# Patient Record
Sex: MALE | Race: WHITE | Employment: OTHER | URBAN - METROPOLITAN AREA
[De-identification: names, ages, dates, MRNs, and addresses within clinical notes are randomized per-mention and may not be internally consistent; named-entity substitution may affect disease eponyms.]

---

## 2022-01-01 ENCOUNTER — HOSPITAL ENCOUNTER (INPATIENT)
Age: 42
LOS: 3 days | Discharge: HOSPICE | DRG: 053 | End: 2022-10-29
Attending: INTERNAL MEDICINE | Admitting: INTERNAL MEDICINE
Payer: COMMERCIAL

## 2022-01-01 ENCOUNTER — APPOINTMENT (OUTPATIENT)
Dept: GENERAL RADIOLOGY | Age: 42
DRG: 196 | End: 2022-01-01
Payer: COMMERCIAL

## 2022-01-01 ENCOUNTER — APPOINTMENT (OUTPATIENT)
Dept: MRI IMAGING | Age: 42
DRG: 196 | End: 2022-01-01
Payer: COMMERCIAL

## 2022-01-01 ENCOUNTER — HOSPITAL ENCOUNTER (INPATIENT)
Age: 42
LOS: 1 days | DRG: 862 | End: 2022-10-30
Attending: INTERNAL MEDICINE | Admitting: INTERNAL MEDICINE
Payer: COMMERCIAL

## 2022-01-01 ENCOUNTER — APPOINTMENT (OUTPATIENT)
Dept: CT IMAGING | Age: 42
DRG: 196 | End: 2022-01-01
Payer: COMMERCIAL

## 2022-01-01 ENCOUNTER — APPOINTMENT (OUTPATIENT)
Dept: GENERAL RADIOLOGY | Age: 42
DRG: 053 | End: 2022-01-01
Attending: INTERNAL MEDICINE
Payer: COMMERCIAL

## 2022-01-01 ENCOUNTER — HOSPITAL ENCOUNTER (INPATIENT)
Age: 42
LOS: 6 days | Discharge: ANOTHER HOSPITAL | DRG: 196 | End: 2022-10-26
Attending: EMERGENCY MEDICINE | Admitting: INTERNAL MEDICINE
Payer: COMMERCIAL

## 2022-01-01 VITALS
RESPIRATION RATE: 18 BRPM | SYSTOLIC BLOOD PRESSURE: 133 MMHG | BODY MASS INDEX: 22.46 KG/M2 | OXYGEN SATURATION: 95 % | DIASTOLIC BLOOD PRESSURE: 89 MMHG | TEMPERATURE: 98.8 F | WEIGHT: 139.77 LBS | HEART RATE: 102 BPM | HEIGHT: 66 IN

## 2022-01-01 VITALS
SYSTOLIC BLOOD PRESSURE: 138 MMHG | DIASTOLIC BLOOD PRESSURE: 83 MMHG | RESPIRATION RATE: 35 BRPM | OXYGEN SATURATION: 84 % | HEART RATE: 125 BPM | BODY MASS INDEX: 24.06 KG/M2 | WEIGHT: 149.69 LBS | TEMPERATURE: 98.8 F | HEIGHT: 66 IN

## 2022-01-01 VITALS — OXYGEN SATURATION: 21 %

## 2022-01-01 DIAGNOSIS — R09.2 RESPIRATORY ARREST (HCC): Primary | ICD-10-CM

## 2022-01-01 DIAGNOSIS — R06.89 HYPERCARBIA: ICD-10-CM

## 2022-01-01 DIAGNOSIS — T17.908A ASPIRATION INTO AIRWAY, INITIAL ENCOUNTER: ICD-10-CM

## 2022-01-01 LAB
A/G RATIO: 1.2 (ref 1.1–2.2)
A/G RATIO: 1.3 (ref 1.1–2.2)
A/G RATIO: 1.3 (ref 1.1–2.2)
A/G RATIO: 1.7 (ref 1.1–2.2)
ALBUMIN SERPL-MCNC: 3.2 G/DL (ref 3.4–5)
ALBUMIN SERPL-MCNC: 3.3 G/DL (ref 3.4–5)
ALBUMIN SERPL-MCNC: 3.4 G/DL (ref 3.4–5)
ALBUMIN SERPL-MCNC: 3.6 G/DL (ref 3.4–5)
ALBUMIN SERPL-MCNC: 3.6 G/DL (ref 3.4–5)
ALBUMIN SERPL-MCNC: 3.7 G/DL (ref 3.4–5)
ALBUMIN SERPL-MCNC: 3.8 G/DL (ref 3.4–5)
ALBUMIN SERPL-MCNC: 4.5 G/DL (ref 3.4–5)
ALP BLD-CCNC: 38 U/L (ref 40–129)
ALP BLD-CCNC: 43 U/L (ref 40–129)
ALP BLD-CCNC: 43 U/L (ref 40–129)
ALP BLD-CCNC: 45 U/L (ref 40–129)
ALP BLD-CCNC: 45 U/L (ref 40–129)
ALP BLD-CCNC: 70 U/L (ref 40–129)
ALT SERPL-CCNC: 14 U/L (ref 10–40)
ALT SERPL-CCNC: 17 U/L (ref 10–40)
ALT SERPL-CCNC: 17 U/L (ref 10–40)
ALT SERPL-CCNC: 20 U/L (ref 10–40)
ALT SERPL-CCNC: 26 U/L (ref 10–40)
ALT SERPL-CCNC: 30 U/L (ref 10–40)
ANION GAP SERPL CALCULATED.3IONS-SCNC: 10 MMOL/L (ref 3–16)
ANION GAP SERPL CALCULATED.3IONS-SCNC: 11 MMOL/L (ref 3–16)
ANION GAP SERPL CALCULATED.3IONS-SCNC: 11 MMOL/L (ref 3–16)
ANION GAP SERPL CALCULATED.3IONS-SCNC: 14 MMOL/L (ref 3–16)
ANION GAP SERPL CALCULATED.3IONS-SCNC: 6 MMOL/L (ref 3–16)
ANION GAP SERPL CALCULATED.3IONS-SCNC: 7 MMOL/L (ref 3–16)
ANION GAP SERPL CALCULATED.3IONS-SCNC: 7 MMOL/L (ref 3–16)
ANION GAP SERPL CALCULATED.3IONS-SCNC: 8 MMOL/L (ref 3–16)
ANION GAP SERPL CALCULATED.3IONS-SCNC: 9 MMOL/L (ref 3–16)
ANION GAP SERPL CALCULATED.3IONS-SCNC: 9 MMOL/L (ref 3–16)
ANISOCYTOSIS: ABNORMAL
APPEARANCE BAL (LAVAGE): NORMAL
AST SERPL-CCNC: 27 U/L (ref 15–37)
AST SERPL-CCNC: 29 U/L (ref 15–37)
AST SERPL-CCNC: 30 U/L (ref 15–37)
AST SERPL-CCNC: 31 U/L (ref 15–37)
AST SERPL-CCNC: 33 U/L (ref 15–37)
AST SERPL-CCNC: 34 U/L (ref 15–37)
ATYPICAL LYMPHOCYTE RELATIVE PERCENT: 2 % (ref 0–6)
BACTERIA: ABNORMAL /HPF
BANDED NEUTROPHILS RELATIVE PERCENT: 3 % (ref 0–7)
BANDED NEUTROPHILS RELATIVE PERCENT: 7 % (ref 0–7)
BANDED NEUTROPHILS RELATIVE PERCENT: 9 % (ref 0–7)
BASE EXCESS ARTERIAL: 10.6 MMOL/L (ref -3–3)
BASE EXCESS ARTERIAL: 10.7 MMOL/L (ref -3–3)
BASE EXCESS ARTERIAL: 10.7 MMOL/L (ref -3–3)
BASE EXCESS ARTERIAL: 13.1 MMOL/L (ref -3–3)
BASE EXCESS ARTERIAL: 16.4 MMOL/L (ref -3–3)
BASE EXCESS ARTERIAL: 17 (ref -3–3)
BASE EXCESS ARTERIAL: 5.6 MMOL/L (ref -3–3)
BASE EXCESS ARTERIAL: 7.1 MMOL/L (ref -3–3)
BASE EXCESS ARTERIAL: 8 MMOL/L (ref -3–3)
BASE EXCESS ARTERIAL: 9.5 MMOL/L (ref -3–3)
BASE EXCESS VENOUS: 13.4 MMOL/L
BASE EXCESS VENOUS: 8.1 MMOL/L
BASOPHILS ABSOLUTE: 0 K/UL (ref 0–0.2)
BASOPHILS ABSOLUTE: 0.1 K/UL (ref 0–0.2)
BASOPHILS RELATIVE PERCENT: 0 %
BASOPHILS RELATIVE PERCENT: 0.1 %
BASOPHILS RELATIVE PERCENT: 0.3 %
BASOPHILS RELATIVE PERCENT: 0.6 %
BASOPHILS RELATIVE PERCENT: 0.7 %
BILIRUB SERPL-MCNC: 0.3 MG/DL (ref 0–1)
BILIRUB SERPL-MCNC: 0.3 MG/DL (ref 0–1)
BILIRUB SERPL-MCNC: 0.4 MG/DL (ref 0–1)
BILIRUB SERPL-MCNC: 0.5 MG/DL (ref 0–1)
BILIRUB SERPL-MCNC: <0.2 MG/DL (ref 0–1)
BILIRUB SERPL-MCNC: <0.2 MG/DL (ref 0–1)
BILIRUBIN URINE: NEGATIVE
BLOOD CULTURE, ROUTINE: NORMAL
BLOOD, URINE: NEGATIVE
BUN BLDV-MCNC: 15 MG/DL (ref 7–20)
BUN BLDV-MCNC: 20 MG/DL (ref 7–20)
BUN BLDV-MCNC: 21 MG/DL (ref 7–20)
BUN BLDV-MCNC: 21 MG/DL (ref 7–20)
BUN BLDV-MCNC: 22 MG/DL (ref 7–20)
BUN BLDV-MCNC: 23 MG/DL (ref 7–20)
BUN BLDV-MCNC: 25 MG/DL (ref 7–20)
BUN BLDV-MCNC: 25 MG/DL (ref 7–20)
CALCIUM SERPL-MCNC: 7.8 MG/DL (ref 8.3–10.6)
CALCIUM SERPL-MCNC: 8.9 MG/DL (ref 8.3–10.6)
CALCIUM SERPL-MCNC: 8.9 MG/DL (ref 8.3–10.6)
CALCIUM SERPL-MCNC: 9 MG/DL (ref 8.3–10.6)
CALCIUM SERPL-MCNC: 9.3 MG/DL (ref 8.3–10.6)
CALCIUM SERPL-MCNC: 9.4 MG/DL (ref 8.3–10.6)
CALCIUM SERPL-MCNC: 9.4 MG/DL (ref 8.3–10.6)
CALCIUM SERPL-MCNC: 9.5 MG/DL (ref 8.3–10.6)
CARBOXYHEMOGLOBIN ARTERIAL: 0.8 % (ref 0–1.5)
CARBOXYHEMOGLOBIN ARTERIAL: 1 % (ref 0–1.5)
CARBOXYHEMOGLOBIN ARTERIAL: 1 % (ref 0–1.5)
CARBOXYHEMOGLOBIN ARTERIAL: 1.2 % (ref 0–1.5)
CARBOXYHEMOGLOBIN ARTERIAL: 1.7 % (ref 0–1.5)
CARBOXYHEMOGLOBIN ARTERIAL: 1.7 % (ref 0–1.5)
CARBOXYHEMOGLOBIN ARTERIAL: 1.8 % (ref 0–1.5)
CARBOXYHEMOGLOBIN ARTERIAL: 1.9 % (ref 0–1.5)
CARBOXYHEMOGLOBIN: 1.7 %
CARBOXYHEMOGLOBIN: 1.7 %
CARBOXYHEMOGLOBIN: 1.8 %
CARBOXYHEMOGLOBIN: 1.9 %
CHLORIDE BLD-SCNC: 89 MMOL/L (ref 99–110)
CHLORIDE BLD-SCNC: 90 MMOL/L (ref 99–110)
CHLORIDE BLD-SCNC: 91 MMOL/L (ref 99–110)
CHLORIDE BLD-SCNC: 92 MMOL/L (ref 99–110)
CHLORIDE BLD-SCNC: 93 MMOL/L (ref 99–110)
CHLORIDE BLD-SCNC: 93 MMOL/L (ref 99–110)
CHLORIDE BLD-SCNC: 94 MMOL/L (ref 99–110)
CHLORIDE BLD-SCNC: 94 MMOL/L (ref 99–110)
CHLORIDE BLD-SCNC: 96 MMOL/L (ref 99–110)
CHLORIDE BLD-SCNC: 97 MMOL/L (ref 99–110)
CLARITY: CLEAR
CLOT EVALUATION BAL: NORMAL
CO2: 28 MMOL/L (ref 21–32)
CO2: 29 MMOL/L (ref 21–32)
CO2: 31 MMOL/L (ref 21–32)
CO2: 31 MMOL/L (ref 21–32)
CO2: 33 MMOL/L (ref 21–32)
CO2: 33 MMOL/L (ref 21–32)
CO2: 34 MMOL/L (ref 21–32)
CO2: 37 MMOL/L (ref 21–32)
CO2: 37 MMOL/L (ref 21–32)
CO2: 45 MMOL/L (ref 21–32)
COLOR LAVAGE: NORMAL
COLOR: YELLOW
COMMENT 4: NORMAL
CREAT SERPL-MCNC: 0.5 MG/DL (ref 0.9–1.3)
CREAT SERPL-MCNC: 0.6 MG/DL (ref 0.9–1.3)
CREAT SERPL-MCNC: 0.7 MG/DL (ref 0.9–1.3)
CREAT SERPL-MCNC: 0.8 MG/DL (ref 0.9–1.3)
CREAT SERPL-MCNC: 1.2 MG/DL (ref 0.9–1.3)
CREAT SERPL-MCNC: <0.5 MG/DL (ref 0.9–1.3)
CULTURE, RESPIRATORY: ABNORMAL
CULTURE, RESPIRATORY: ABNORMAL
EKG ATRIAL RATE: 112 BPM
EKG DIAGNOSIS: NORMAL
EKG P AXIS: 51 DEGREES
EKG P-R INTERVAL: 146 MS
EKG Q-T INTERVAL: 332 MS
EKG QRS DURATION: 96 MS
EKG QTC CALCULATION (BAZETT): 453 MS
EKG R AXIS: 65 DEGREES
EKG T AXIS: 25 DEGREES
EKG VENTRICULAR RATE: 112 BPM
EOSINOPHILS ABSOLUTE: 0 K/UL (ref 0–0.6)
EOSINOPHILS ABSOLUTE: 0.1 K/UL (ref 0–0.6)
EOSINOPHILS ABSOLUTE: 0.2 K/UL (ref 0–0.6)
EOSINOPHILS ABSOLUTE: 0.5 K/UL (ref 0–0.6)
EOSINOPHILS ABSOLUTE: 1 K/UL (ref 0–0.6)
EOSINOPHILS RELATIVE PERCENT: 0 %
EOSINOPHILS RELATIVE PERCENT: 0.4 %
EOSINOPHILS RELATIVE PERCENT: 2 %
EOSINOPHILS RELATIVE PERCENT: 2.7 %
EOSINOPHILS RELATIVE PERCENT: 5.7 %
EPITHELIAL CELLS FLUID: 100 %
EPITHELIAL CELLS, UA: 2 /HPF (ref 0–5)
GFR SERPL CREATININE-BSD FRML MDRD: >60 ML/MIN/{1.73_M2}
GLUCOSE BLD-MCNC: 100 MG/DL (ref 70–99)
GLUCOSE BLD-MCNC: 102 MG/DL (ref 70–99)
GLUCOSE BLD-MCNC: 103 MG/DL (ref 70–99)
GLUCOSE BLD-MCNC: 109 MG/DL (ref 70–99)
GLUCOSE BLD-MCNC: 111 MG/DL (ref 70–99)
GLUCOSE BLD-MCNC: 112 MG/DL (ref 70–99)
GLUCOSE BLD-MCNC: 114 MG/DL (ref 70–99)
GLUCOSE BLD-MCNC: 117 MG/DL (ref 70–99)
GLUCOSE BLD-MCNC: 120 MG/DL (ref 70–99)
GLUCOSE BLD-MCNC: 121 MG/DL (ref 70–99)
GLUCOSE BLD-MCNC: 121 MG/DL (ref 70–99)
GLUCOSE BLD-MCNC: 122 MG/DL (ref 70–99)
GLUCOSE BLD-MCNC: 122 MG/DL (ref 70–99)
GLUCOSE BLD-MCNC: 124 MG/DL (ref 70–99)
GLUCOSE BLD-MCNC: 126 MG/DL (ref 70–99)
GLUCOSE BLD-MCNC: 130 MG/DL (ref 70–99)
GLUCOSE BLD-MCNC: 131 MG/DL (ref 70–99)
GLUCOSE BLD-MCNC: 133 MG/DL (ref 70–99)
GLUCOSE BLD-MCNC: 138 MG/DL (ref 70–99)
GLUCOSE BLD-MCNC: 140 MG/DL (ref 70–99)
GLUCOSE BLD-MCNC: 141 MG/DL (ref 70–99)
GLUCOSE BLD-MCNC: 141 MG/DL (ref 70–99)
GLUCOSE BLD-MCNC: 148 MG/DL (ref 70–99)
GLUCOSE BLD-MCNC: 152 MG/DL (ref 70–99)
GLUCOSE BLD-MCNC: 154 MG/DL (ref 70–99)
GLUCOSE BLD-MCNC: 159 MG/DL (ref 70–99)
GLUCOSE BLD-MCNC: 160 MG/DL (ref 70–99)
GLUCOSE BLD-MCNC: 164 MG/DL (ref 70–99)
GLUCOSE BLD-MCNC: 165 MG/DL (ref 70–99)
GLUCOSE BLD-MCNC: 167 MG/DL (ref 70–99)
GLUCOSE BLD-MCNC: 169 MG/DL (ref 70–99)
GLUCOSE BLD-MCNC: 170 MG/DL (ref 70–99)
GLUCOSE BLD-MCNC: 174 MG/DL (ref 70–99)
GLUCOSE BLD-MCNC: 181 MG/DL (ref 70–99)
GLUCOSE BLD-MCNC: 182 MG/DL (ref 70–99)
GLUCOSE BLD-MCNC: 183 MG/DL (ref 70–99)
GLUCOSE BLD-MCNC: 185 MG/DL (ref 70–99)
GLUCOSE BLD-MCNC: 189 MG/DL (ref 70–99)
GLUCOSE BLD-MCNC: 215 MG/DL (ref 70–99)
GLUCOSE BLD-MCNC: 89 MG/DL (ref 70–99)
GLUCOSE BLD-MCNC: 89 MG/DL (ref 70–99)
GLUCOSE BLD-MCNC: 92 MG/DL (ref 70–99)
GLUCOSE BLD-MCNC: 94 MG/DL (ref 70–99)
GLUCOSE BLD-MCNC: 97 MG/DL (ref 70–99)
GLUCOSE URINE: 100 MG/DL
GRAM STAIN RESULT: ABNORMAL
HCO3 ARTERIAL: 32 MMOL/L (ref 21–29)
HCO3 ARTERIAL: 32 MMOL/L (ref 21–29)
HCO3 ARTERIAL: 35.6 MMOL/L (ref 21–29)
HCO3 ARTERIAL: 36.5 MMOL/L (ref 21–29)
HCO3 ARTERIAL: 37 MMOL/L (ref 21–29)
HCO3 ARTERIAL: 37.8 MMOL/L (ref 21–29)
HCO3 ARTERIAL: 39.4 MMOL/L (ref 21–29)
HCO3 ARTERIAL: 43.9 MMOL/L (ref 21–29)
HCO3 ARTERIAL: 44.3 MMOL/L (ref 21–29)
HCO3 ARTERIAL: 45 MMOL/L (ref 21–29)
HCO3 VENOUS: 36 MMOL/L (ref 23–29)
HCO3 VENOUS: 40 MMOL/L (ref 23–29)
HCT VFR BLD CALC: 28.2 % (ref 40.5–52.5)
HCT VFR BLD CALC: 28.6 % (ref 40.5–52.5)
HCT VFR BLD CALC: 29.6 % (ref 40.5–52.5)
HCT VFR BLD CALC: 29.9 % (ref 40.5–52.5)
HCT VFR BLD CALC: 30.1 % (ref 40.5–52.5)
HCT VFR BLD CALC: 30.1 % (ref 40.5–52.5)
HCT VFR BLD CALC: 30.2 % (ref 40.5–52.5)
HCT VFR BLD CALC: 30.4 % (ref 40.5–52.5)
HCT VFR BLD CALC: 32.3 % (ref 40.5–52.5)
HCT VFR BLD CALC: 33.4 % (ref 40.5–52.5)
HEMOGLOBIN, ART, EXTENDED: 10.1 G/DL (ref 13.5–17.5)
HEMOGLOBIN, ART, EXTENDED: 10.1 G/DL (ref 13.5–17.5)
HEMOGLOBIN, ART, EXTENDED: 10.2 G/DL (ref 13.5–17.5)
HEMOGLOBIN, ART, EXTENDED: 10.5 G/DL (ref 13.5–17.5)
HEMOGLOBIN, ART, EXTENDED: 11.9 G/DL
HEMOGLOBIN, ART, EXTENDED: 9.2 G/DL (ref 13.5–17.5)
HEMOGLOBIN, ART, EXTENDED: 9.4 G/DL (ref 13.5–17.5)
HEMOGLOBIN, ART, EXTENDED: 9.4 G/DL (ref 13.5–17.5)
HEMOGLOBIN, ART, EXTENDED: 9.6 G/DL
HEMOGLOBIN, ART, EXTENDED: 9.7 G/DL
HEMOGLOBIN: 8.8 G/DL (ref 13.5–17.5)
HEMOGLOBIN: 9 G/DL (ref 13.5–17.5)
HEMOGLOBIN: 9.1 G/DL (ref 13.5–17.5)
HEMOGLOBIN: 9.2 G/DL (ref 13.5–17.5)
HEMOGLOBIN: 9.2 G/DL (ref 13.5–17.5)
HEMOGLOBIN: 9.3 G/DL (ref 13.5–17.5)
HEMOGLOBIN: 9.4 G/DL (ref 13.5–17.5)
HEMOGLOBIN: 9.4 G/DL (ref 13.5–17.5)
HEMOGLOBIN: 9.6 G/DL (ref 13.5–17.5)
HEMOGLOBIN: 9.7 G/DL (ref 13.5–17.5)
HYALINE CASTS: 9 /LPF (ref 0–8)
KETONES, URINE: NEGATIVE MG/DL
L. PNEUMOPHILA SEROGP 1 UR AG: NORMAL
LACTATE DEHYDROGENASE: 415 U/L (ref 100–190)
LACTIC ACID: 1 MMOL/L (ref 0.4–2)
LACTIC ACID: 1.3 MMOL/L (ref 0.4–2)
LACTIC ACID: 2 MMOL/L (ref 0.4–2)
LACTIC ACID: 2.4 MMOL/L (ref 0.4–2)
LACTIC ACID: 3.2 MMOL/L (ref 0.4–2)
LACTIC ACID: 3.7 MMOL/L (ref 0.4–2)
LACTIC ACID: 4.1 MMOL/L (ref 0.4–2)
LACTIC ACID: 4.6 MMOL/L (ref 0.4–2)
LACTIC ACID: 7.1 MMOL/L (ref 0.4–2)
LEUKOCYTE ESTERASE, URINE: NEGATIVE
LV EF: 60 %
LVEF MODALITY: NORMAL
LYMPHOCYTES ABSOLUTE: 0.4 K/UL (ref 1–5.1)
LYMPHOCYTES ABSOLUTE: 0.5 K/UL (ref 1–5.1)
LYMPHOCYTES ABSOLUTE: 0.6 K/UL (ref 1–5.1)
LYMPHOCYTES ABSOLUTE: 0.9 K/UL (ref 1–5.1)
LYMPHOCYTES ABSOLUTE: 1 K/UL (ref 1–5.1)
LYMPHOCYTES ABSOLUTE: 1.2 K/UL (ref 1–5.1)
LYMPHOCYTES ABSOLUTE: 2.6 K/UL (ref 1–5.1)
LYMPHOCYTES ABSOLUTE: 2.6 K/UL (ref 1–5.1)
LYMPHOCYTES ABSOLUTE: 3.2 K/UL (ref 1–5.1)
LYMPHOCYTES RELATIVE PERCENT: 14.8 %
LYMPHOCYTES RELATIVE PERCENT: 15.6 %
LYMPHOCYTES RELATIVE PERCENT: 16 %
LYMPHOCYTES RELATIVE PERCENT: 2 %
LYMPHOCYTES RELATIVE PERCENT: 3.4 %
LYMPHOCYTES RELATIVE PERCENT: 5 %
LYMPHOCYTES RELATIVE PERCENT: 5.1 %
LYMPHOCYTES RELATIVE PERCENT: 5.3 %
LYMPHOCYTES RELATIVE PERCENT: 9 %
MAGNESIUM: 1.5 MG/DL (ref 1.8–2.4)
MAGNESIUM: 1.9 MG/DL (ref 1.8–2.4)
MAGNESIUM: 2 MG/DL (ref 1.8–2.4)
MAGNESIUM: 2.2 MG/DL (ref 1.8–2.4)
MAGNESIUM: 2.3 MG/DL (ref 1.8–2.4)
MCH RBC QN AUTO: 24.7 PG (ref 26–34)
MCH RBC QN AUTO: 24.8 PG (ref 26–34)
MCH RBC QN AUTO: 24.8 PG (ref 26–34)
MCH RBC QN AUTO: 24.9 PG (ref 26–34)
MCH RBC QN AUTO: 24.9 PG (ref 26–34)
MCH RBC QN AUTO: 25 PG (ref 26–34)
MCH RBC QN AUTO: 25.1 PG (ref 26–34)
MCH RBC QN AUTO: 25.1 PG (ref 26–34)
MCH RBC QN AUTO: 25.2 PG (ref 26–34)
MCH RBC QN AUTO: 26 PG (ref 26–34)
MCHC RBC AUTO-ENTMCNC: 29.2 G/DL (ref 31–36)
MCHC RBC AUTO-ENTMCNC: 29.6 G/DL (ref 31–36)
MCHC RBC AUTO-ENTMCNC: 30.5 G/DL (ref 31–36)
MCHC RBC AUTO-ENTMCNC: 30.5 G/DL (ref 31–36)
MCHC RBC AUTO-ENTMCNC: 30.6 G/DL (ref 31–36)
MCHC RBC AUTO-ENTMCNC: 30.7 G/DL (ref 31–36)
MCHC RBC AUTO-ENTMCNC: 30.8 G/DL (ref 31–36)
MCHC RBC AUTO-ENTMCNC: 31.1 G/DL (ref 31–36)
MCHC RBC AUTO-ENTMCNC: 31.5 G/DL (ref 31–36)
MCHC RBC AUTO-ENTMCNC: 31.9 G/DL (ref 31–36)
MCV RBC AUTO: 79.9 FL (ref 80–100)
MCV RBC AUTO: 80.1 FL (ref 80–100)
MCV RBC AUTO: 80.6 FL (ref 80–100)
MCV RBC AUTO: 80.8 FL (ref 80–100)
MCV RBC AUTO: 81.2 FL (ref 80–100)
MCV RBC AUTO: 81.3 FL (ref 80–100)
MCV RBC AUTO: 81.5 FL (ref 80–100)
MCV RBC AUTO: 81.6 FL (ref 80–100)
MCV RBC AUTO: 84.8 FL (ref 80–100)
MCV RBC AUTO: 85.9 FL (ref 80–100)
METHEMOGLOBIN ARTERIAL: 0.2 % (ref 0–1.4)
METHEMOGLOBIN ARTERIAL: 0.3 %
METHEMOGLOBIN ARTERIAL: 0.3 % (ref 0–1.4)
METHEMOGLOBIN ARTERIAL: 0.5 %
METHEMOGLOBIN ARTERIAL: 0.5 % (ref 0–1.4)
METHEMOGLOBIN ARTERIAL: 0.6 %
METHEMOGLOBIN VENOUS: 0.6 %
METHEMOGLOBIN VENOUS: 0.6 %
METHEMOGLOBIN VENOUS: 0.7 %
METHEMOGLOBIN VENOUS: 0.7 %
MICROSCOPIC EXAMINATION: YES
MONOCYTES ABSOLUTE: 0.3 K/UL (ref 0–1.3)
MONOCYTES ABSOLUTE: 0.8 K/UL (ref 0–1.3)
MONOCYTES ABSOLUTE: 0.9 K/UL (ref 0–1.3)
MONOCYTES ABSOLUTE: 0.9 K/UL (ref 0–1.3)
MONOCYTES ABSOLUTE: 1.2 K/UL (ref 0–1.3)
MONOCYTES ABSOLUTE: 1.2 K/UL (ref 0–1.3)
MONOCYTES ABSOLUTE: 1.3 K/UL (ref 0–1.3)
MONOCYTES ABSOLUTE: 1.9 K/UL (ref 0–1.3)
MONOCYTES ABSOLUTE: 1.9 K/UL (ref 0–1.3)
MONOCYTES RELATIVE PERCENT: 11 %
MONOCYTES RELATIVE PERCENT: 11.6 %
MONOCYTES RELATIVE PERCENT: 2 %
MONOCYTES RELATIVE PERCENT: 4.7 %
MONOCYTES RELATIVE PERCENT: 5.4 %
MONOCYTES RELATIVE PERCENT: 6 %
MONOCYTES RELATIVE PERCENT: 6.7 %
MONOCYTES RELATIVE PERCENT: 6.7 %
MONOCYTES RELATIVE PERCENT: 9.4 %
MRSA SCREEN RT-PCR: NORMAL
NEUTROPHILS ABSOLUTE: 10.3 K/UL (ref 1.7–7.7)
NEUTROPHILS ABSOLUTE: 11.2 K/UL (ref 1.7–7.7)
NEUTROPHILS ABSOLUTE: 13.4 K/UL (ref 1.7–7.7)
NEUTROPHILS ABSOLUTE: 14.2 K/UL (ref 1.7–7.7)
NEUTROPHILS ABSOLUTE: 14.3 K/UL (ref 1.7–7.7)
NEUTROPHILS ABSOLUTE: 15.8 K/UL (ref 1.7–7.7)
NEUTROPHILS ABSOLUTE: 17.4 K/UL (ref 1.7–7.7)
NEUTROPHILS ABSOLUTE: 20.1 K/UL (ref 1.7–7.7)
NEUTROPHILS ABSOLUTE: 8.4 K/UL (ref 1.7–7.7)
NEUTROPHILS RELATIVE PERCENT: 67 %
NEUTROPHILS RELATIVE PERCENT: 69 %
NEUTROPHILS RELATIVE PERCENT: 71.6 %
NEUTROPHILS RELATIVE PERCENT: 77.5 %
NEUTROPHILS RELATIVE PERCENT: 83 %
NEUTROPHILS RELATIVE PERCENT: 88.1 %
NEUTROPHILS RELATIVE PERCENT: 89.9 %
NEUTROPHILS RELATIVE PERCENT: 90 %
NEUTROPHILS RELATIVE PERCENT: 90.5 %
NITRITE, URINE: NEGATIVE
NUMBER OF CELLS COUNTED BAL (LAVAGE): 100
O2 CONTENT ARTERIAL: 12 ML/DL
O2 CONTENT ARTERIAL: 13 ML/DL
O2 SAT, ARTERIAL: 87 % (ref 93–100)
O2 SAT, ARTERIAL: 93.7 %
O2 SAT, ARTERIAL: 94 % (ref 93–100)
O2 SAT, ARTERIAL: 94.1 %
O2 SAT, ARTERIAL: 94.3 %
O2 SAT, ARTERIAL: 96.2 %
O2 SAT, ARTERIAL: 96.5 %
O2 SAT, ARTERIAL: 97 % (ref 93–100)
O2 SAT, ARTERIAL: 97 % (ref 93–100)
O2 SAT, VEN: 100 %
O2 SAT, VEN: 67 %
O2 SAT, VEN: 72 %
O2 SAT, VEN: 96 %
O2 THERAPY: ABNORMAL
ORGANISM: ABNORMAL
PCO2 ARTERIAL: 104 MMHG (ref 35–45)
PCO2 ARTERIAL: 111.5 MM HG (ref 35–45)
PCO2 ARTERIAL: 46.9 MMHG (ref 35–45)
PCO2 ARTERIAL: 52.8 MMHG (ref 35–45)
PCO2 ARTERIAL: 57 MMHG (ref 35–45)
PCO2 ARTERIAL: 59.2 MMHG (ref 35–45)
PCO2 ARTERIAL: 66.3 MMHG (ref 35–45)
PCO2 ARTERIAL: 75 MMHG (ref 35–45)
PCO2 ARTERIAL: 75.4 MMHG (ref 35–45)
PCO2 ARTERIAL: 79.1 MMHG (ref 35–45)
PCO2 ARTERIAL: >120 MMHG (ref 35–45)
PCO2, VEN: 60.4 MMHG (ref 40–50)
PCO2, VEN: 69.3 MMHG (ref 40–50)
PCO2, VEN: >120 MMHG (ref 40–50)
PCO2, VEN: >120 MMHG (ref 40–50)
PDW BLD-RTO: 23.9 % (ref 12.4–15.4)
PDW BLD-RTO: 23.9 % (ref 12.4–15.4)
PDW BLD-RTO: 24 % (ref 12.4–15.4)
PDW BLD-RTO: 24.2 % (ref 12.4–15.4)
PDW BLD-RTO: 24.3 % (ref 12.4–15.4)
PDW BLD-RTO: 24.4 % (ref 12.4–15.4)
PDW BLD-RTO: 24.5 % (ref 12.4–15.4)
PDW BLD-RTO: 24.6 % (ref 12.4–15.4)
PDW BLD-RTO: 24.7 % (ref 12.4–15.4)
PDW BLD-RTO: 24.8 % (ref 12.4–15.4)
PERFORMED ON: ABNORMAL
PERFORMED ON: NORMAL
PH ARTERIAL: 6.96 (ref 7.35–7.45)
PH ARTERIAL: 7.21 (ref 7.35–7.45)
PH ARTERIAL: 7.23 (ref 7.35–7.45)
PH ARTERIAL: 7.29 (ref 7.35–7.45)
PH ARTERIAL: 7.3 (ref 7.35–7.45)
PH ARTERIAL: 7.35 (ref 7.35–7.45)
PH ARTERIAL: 7.36 (ref 7.35–7.45)
PH ARTERIAL: 7.38 (ref 7.35–7.45)
PH ARTERIAL: 7.4 (ref 7.35–7.45)
PH ARTERIAL: 7.44 (ref 7.35–7.45)
PH ARTERIAL: 7.45 (ref 7.35–7.45)
PH UA: 5.5 (ref 5–8)
PH VENOUS: 7.04 (ref 7.35–7.45)
PH VENOUS: 7.07 (ref 7.35–7.45)
PH VENOUS: 7.32 (ref 7.35–7.45)
PH VENOUS: 7.42 (ref 7.35–7.45)
PHOSPHORUS: 1.4 MG/DL (ref 2.5–4.9)
PHOSPHORUS: 2.2 MG/DL (ref 2.5–4.9)
PHOSPHORUS: 2.9 MG/DL (ref 2.5–4.9)
PHOSPHORUS: 2.9 MG/DL (ref 2.5–4.9)
PHOSPHORUS: 3.4 MG/DL (ref 2.5–4.9)
PHOSPHORUS: 3.8 MG/DL (ref 2.5–4.9)
PHOSPHORUS: 3.9 MG/DL (ref 2.5–4.9)
PHOSPHORUS: 4.7 MG/DL (ref 2.5–4.9)
PLATELET # BLD: 398 K/UL (ref 135–450)
PLATELET # BLD: 445 K/UL (ref 135–450)
PLATELET # BLD: 455 K/UL (ref 135–450)
PLATELET # BLD: 460 K/UL (ref 135–450)
PLATELET # BLD: 474 K/UL (ref 135–450)
PLATELET # BLD: 476 K/UL (ref 135–450)
PLATELET # BLD: 507 K/UL (ref 135–450)
PLATELET # BLD: 509 K/UL (ref 135–450)
PLATELET # BLD: 528 K/UL (ref 135–450)
PLATELET # BLD: 532 K/UL (ref 135–450)
PLATELET SLIDE REVIEW: ABNORMAL
PLATELET SLIDE REVIEW: ABNORMAL
PLATELET SLIDE REVIEW: ADEQUATE
PMV BLD AUTO: 6.9 FL (ref 5–10.5)
PMV BLD AUTO: 7 FL (ref 5–10.5)
PMV BLD AUTO: 7.1 FL (ref 5–10.5)
PMV BLD AUTO: 7.2 FL (ref 5–10.5)
PMV BLD AUTO: 7.2 FL (ref 5–10.5)
PMV BLD AUTO: 7.3 FL (ref 5–10.5)
PMV BLD AUTO: 7.4 FL (ref 5–10.5)
PMV BLD AUTO: 7.5 FL (ref 5–10.5)
PMV BLD AUTO: 7.5 FL (ref 5–10.5)
PMV BLD AUTO: 7.6 FL (ref 5–10.5)
PO2 ARTERIAL: 103 MMHG (ref 75–108)
PO2 ARTERIAL: 111 MMHG (ref 75–108)
PO2 ARTERIAL: 64.4 MMHG (ref 75–108)
PO2 ARTERIAL: 65.5 MMHG (ref 75–108)
PO2 ARTERIAL: 68.3 MM HG (ref 75–108)
PO2 ARTERIAL: 73.5 MMHG (ref 75–108)
PO2 ARTERIAL: 76 MMHG (ref 75–108)
PO2 ARTERIAL: 76.2 MMHG (ref 75–108)
PO2 ARTERIAL: 77.7 MMHG (ref 75–108)
PO2 ARTERIAL: 78.8 MMHG (ref 75–108)
PO2 ARTERIAL: 79.4 MMHG (ref 75–108)
PO2, VEN: 163 MMHG
PO2, VEN: 39 MMHG
PO2, VEN: 40 MMHG
PO2, VEN: 85 MMHG
POC SAMPLE TYPE: ABNORMAL
POIKILOCYTES: ABNORMAL
POTASSIUM REFLEX MAGNESIUM: 4.5 MMOL/L (ref 3.5–5.1)
POTASSIUM REFLEX MAGNESIUM: 4.9 MMOL/L (ref 3.5–5.1)
POTASSIUM SERPL-SCNC: 3.5 MMOL/L (ref 3.5–5.1)
POTASSIUM SERPL-SCNC: 3.8 MMOL/L (ref 3.5–5.1)
POTASSIUM SERPL-SCNC: 3.8 MMOL/L (ref 3.5–5.1)
POTASSIUM SERPL-SCNC: 4.2 MMOL/L (ref 3.5–5.1)
POTASSIUM SERPL-SCNC: 4.7 MMOL/L (ref 3.5–5.1)
POTASSIUM SERPL-SCNC: 5.2 MMOL/L (ref 3.5–5.1)
PROCALCITONIN: 0.12 NG/ML (ref 0–0.15)
PROCALCITONIN: 0.19 NG/ML (ref 0–0.15)
PROCALCITONIN: 0.34 NG/ML (ref 0–0.15)
PROTEIN UA: 300 MG/DL
RBC # BLD: 3.45 M/UL (ref 4.2–5.9)
RBC # BLD: 3.55 M/UL (ref 4.2–5.9)
RBC # BLD: 3.7 M/UL (ref 4.2–5.9)
RBC # BLD: 3.71 M/UL (ref 4.2–5.9)
RBC # BLD: 3.72 M/UL (ref 4.2–5.9)
RBC # BLD: 3.79 M/UL (ref 4.2–5.9)
RBC # BLD: 3.81 M/UL (ref 4.2–5.9)
RBC # BLD: 3.89 M/UL (ref 4.2–5.9)
RBC UA: 1 /HPF (ref 0–4)
RBC, BAL: 2 /CUMM
SLIDE REVIEW: ABNORMAL
SODIUM BLD-SCNC: 125 MMOL/L (ref 136–145)
SODIUM BLD-SCNC: 130 MMOL/L (ref 136–145)
SODIUM BLD-SCNC: 132 MMOL/L (ref 136–145)
SODIUM BLD-SCNC: 133 MMOL/L (ref 136–145)
SODIUM BLD-SCNC: 134 MMOL/L (ref 136–145)
SODIUM BLD-SCNC: 137 MMOL/L (ref 136–145)
SODIUM BLD-SCNC: 140 MMOL/L (ref 136–145)
SODIUM BLD-SCNC: 141 MMOL/L (ref 136–145)
SODIUM BLD-SCNC: 142 MMOL/L (ref 136–145)
SODIUM BLD-SCNC: 145 MMOL/L (ref 136–145)
SPECIFIC GRAVITY UA: 1.02 (ref 1–1.03)
SPHEROCYTES: ABNORMAL
STREP PNEUMONIAE ANTIGEN, URINE: NORMAL
TCO2 ARTERIAL: 34 MMOL/L
TCO2 ARTERIAL: 34 MMOL/L
TCO2 ARTERIAL: 37.3 MMOL/L
TCO2 ARTERIAL: 38 MMOL/L
TCO2 ARTERIAL: 38.8 MMOL/L
TCO2 ARTERIAL: 39.8 MMOL/L
TCO2 ARTERIAL: 41.8 MMOL/L
TCO2 ARTERIAL: 46.6 MMOL/L
TCO2 ARTERIAL: 47.2 MMOL/L
TCO2 ARTERIAL: 48 MMOL/L
TCO2 CALC VENOUS: 38 MMOL/L
TCO2 CALC VENOUS: 41 MMOL/L
TOTAL CK: 76 U/L (ref 39–308)
TOTAL PROTEIN: 5.6 G/DL (ref 6.4–8.2)
TOTAL PROTEIN: 6.1 G/DL (ref 6.4–8.2)
TOTAL PROTEIN: 6.3 G/DL (ref 6.4–8.2)
TOTAL PROTEIN: 6.7 G/DL (ref 6.4–8.2)
TOTAL PROTEIN: 6.9 G/DL (ref 6.4–8.2)
TOTAL PROTEIN: 7.2 G/DL (ref 6.4–8.2)
TROPONIN: <0.01 NG/ML
URINE REFLEX TO CULTURE: ABNORMAL
URINE TYPE: ABNORMAL
UROBILINOGEN, URINE: 0.2 E.U./DL
VACUOLATED NEUTROPHILS: PRESENT
WBC # BLD: 11 K/UL (ref 4–11)
WBC # BLD: 11.6 K/UL (ref 4–11)
WBC # BLD: 14.7 K/UL (ref 4–11)
WBC # BLD: 15.7 K/UL (ref 4–11)
WBC # BLD: 16.7 K/UL (ref 4–11)
WBC # BLD: 17 K/UL (ref 4–11)
WBC # BLD: 17.3 K/UL (ref 4–11)
WBC # BLD: 19.3 K/UL (ref 4–11)
WBC # BLD: 19.9 K/UL (ref 4–11)
WBC # BLD: 21.9 K/UL (ref 4–11)
WBC UA: 9 /HPF (ref 0–5)
WBC/EPI CELLS BAL: 165 /CUMM

## 2022-01-01 PROCEDURE — 6360000002 HC RX W HCPCS: Performed by: NURSE PRACTITIONER

## 2022-01-01 PROCEDURE — 6360000002 HC RX W HCPCS: Performed by: INTERNAL MEDICINE

## 2022-01-01 PROCEDURE — 2580000003 HC RX 258

## 2022-01-01 PROCEDURE — 83605 ASSAY OF LACTIC ACID: CPT

## 2022-01-01 PROCEDURE — 0BH17EZ INSERTION OF ENDOTRACHEAL AIRWAY INTO TRACHEA, VIA NATURAL OR ARTIFICIAL OPENING: ICD-10-PCS | Performed by: STUDENT IN AN ORGANIZED HEALTH CARE EDUCATION/TRAINING PROGRAM

## 2022-01-01 PROCEDURE — A4216 STERILE WATER/SALINE, 10 ML: HCPCS | Performed by: INTERNAL MEDICINE

## 2022-01-01 PROCEDURE — 71250 CT THORAX DX C-: CPT

## 2022-01-01 PROCEDURE — 94760 N-INVAS EAR/PLS OXIMETRY 1: CPT

## 2022-01-01 PROCEDURE — 94761 N-INVAS EAR/PLS OXIMETRY MLT: CPT

## 2022-01-01 PROCEDURE — 99291 CRITICAL CARE FIRST HOUR: CPT | Performed by: INTERNAL MEDICINE

## 2022-01-01 PROCEDURE — 83735 ASSAY OF MAGNESIUM: CPT

## 2022-01-01 PROCEDURE — 6370000000 HC RX 637 (ALT 250 FOR IP): Performed by: INTERNAL MEDICINE

## 2022-01-01 PROCEDURE — 2500000003 HC RX 250 WO HCPCS

## 2022-01-01 PROCEDURE — 2000000000 HC ICU R&B

## 2022-01-01 PROCEDURE — 36415 COLL VENOUS BLD VENIPUNCTURE: CPT

## 2022-01-01 PROCEDURE — 2500000003 HC RX 250 WO HCPCS: Performed by: STUDENT IN AN ORGANIZED HEALTH CARE EDUCATION/TRAINING PROGRAM

## 2022-01-01 PROCEDURE — 94640 AIRWAY INHALATION TREATMENT: CPT

## 2022-01-01 PROCEDURE — A4216 STERILE WATER/SALINE, 10 ML: HCPCS

## 2022-01-01 PROCEDURE — 82803 BLOOD GASES ANY COMBINATION: CPT

## 2022-01-01 PROCEDURE — 6370000000 HC RX 637 (ALT 250 FOR IP): Performed by: NURSE PRACTITIONER

## 2022-01-01 PROCEDURE — 84145 PROCALCITONIN (PCT): CPT

## 2022-01-01 PROCEDURE — 36600 WITHDRAWAL OF ARTERIAL BLOOD: CPT

## 2022-01-01 PROCEDURE — 6360000002 HC RX W HCPCS: Performed by: STUDENT IN AN ORGANIZED HEALTH CARE EDUCATION/TRAINING PROGRAM

## 2022-01-01 PROCEDURE — 2580000003 HC RX 258: Performed by: STUDENT IN AN ORGANIZED HEALTH CARE EDUCATION/TRAINING PROGRAM

## 2022-01-01 PROCEDURE — 74018 RADEX ABDOMEN 1 VIEW: CPT

## 2022-01-01 PROCEDURE — 2500000003 HC RX 250 WO HCPCS: Performed by: INTERNAL MEDICINE

## 2022-01-01 PROCEDURE — 84100 ASSAY OF PHOSPHORUS: CPT

## 2022-01-01 PROCEDURE — 6370000000 HC RX 637 (ALT 250 FOR IP): Performed by: STUDENT IN AN ORGANIZED HEALTH CARE EDUCATION/TRAINING PROGRAM

## 2022-01-01 PROCEDURE — 2700000000 HC OXYGEN THERAPY PER DAY

## 2022-01-01 PROCEDURE — 99285 EMERGENCY DEPT VISIT HI MDM: CPT

## 2022-01-01 PROCEDURE — 89051 BODY FLUID CELL COUNT: CPT

## 2022-01-01 PROCEDURE — 31624 DX BRONCHOSCOPE/LAVAGE: CPT | Performed by: INTERNAL MEDICINE

## 2022-01-01 PROCEDURE — 99233 SBSQ HOSP IP/OBS HIGH 50: CPT | Performed by: INTERNAL MEDICINE

## 2022-01-01 PROCEDURE — 0BH17EZ INSERTION OF ENDOTRACHEAL AIRWAY INTO TRACHEA, VIA NATURAL OR ARTIFICIAL OPENING: ICD-10-PCS | Performed by: EMERGENCY MEDICINE

## 2022-01-01 PROCEDURE — 95813 EEG EXTND MNTR 61-119 MIN: CPT

## 2022-01-01 PROCEDURE — 87181 SC STD AGAR DILUTION PER AGT: CPT

## 2022-01-01 PROCEDURE — 94003 VENT MGMT INPAT SUBQ DAY: CPT

## 2022-01-01 PROCEDURE — 71045 X-RAY EXAM CHEST 1 VIEW: CPT

## 2022-01-01 PROCEDURE — 84484 ASSAY OF TROPONIN QUANT: CPT

## 2022-01-01 PROCEDURE — 6370000000 HC RX 637 (ALT 250 FOR IP): Performed by: HOSPITALIST

## 2022-01-01 PROCEDURE — 2580000003 HC RX 258: Performed by: NURSE PRACTITIONER

## 2022-01-01 PROCEDURE — 51702 INSERT TEMP BLADDER CATH: CPT

## 2022-01-01 PROCEDURE — 31622 DX BRONCHOSCOPE/WASH: CPT

## 2022-01-01 PROCEDURE — 6360000004 HC RX CONTRAST MEDICATION: Performed by: EMERGENCY MEDICINE

## 2022-01-01 PROCEDURE — 74174 CTA ABD&PLVS W/CONTRAST: CPT

## 2022-01-01 PROCEDURE — 6360000002 HC RX W HCPCS: Performed by: EMERGENCY MEDICINE

## 2022-01-01 PROCEDURE — 0BH17EZ INSERTION OF ENDOTRACHEAL AIRWAY INTO TRACHEA, VIA NATURAL OR ARTIFICIAL OPENING: ICD-10-PCS | Performed by: INTERNAL MEDICINE

## 2022-01-01 PROCEDURE — 2580000003 HC RX 258: Performed by: INTERNAL MEDICINE

## 2022-01-01 PROCEDURE — 6370000000 HC RX 637 (ALT 250 FOR IP)

## 2022-01-01 PROCEDURE — 2060000000 HC ICU INTERMEDIATE R&B

## 2022-01-01 PROCEDURE — 36592 COLLECT BLOOD FROM PICC: CPT

## 2022-01-01 PROCEDURE — 70551 MRI BRAIN STEM W/O DYE: CPT

## 2022-01-01 PROCEDURE — 6360000002 HC RX W HCPCS

## 2022-01-01 PROCEDURE — 94002 VENT MGMT INPAT INIT DAY: CPT

## 2022-01-01 PROCEDURE — 96374 THER/PROPH/DIAG INJ IV PUSH: CPT

## 2022-01-01 PROCEDURE — 0B9F8ZX DRAINAGE OF RIGHT LOWER LUNG LOBE, VIA NATURAL OR ARTIFICIAL OPENING ENDOSCOPIC, DIAGNOSTIC: ICD-10-PCS | Performed by: INTERNAL MEDICINE

## 2022-01-01 PROCEDURE — 80053 COMPREHEN METABOLIC PANEL: CPT

## 2022-01-01 PROCEDURE — 31500 INSERT EMERGENCY AIRWAY: CPT

## 2022-01-01 PROCEDURE — APPNB15 APP NON BILLABLE TIME 0-15 MINS: Performed by: NURSE PRACTITIONER

## 2022-01-01 PROCEDURE — 87186 SC STD MICRODIL/AGAR DIL: CPT

## 2022-01-01 PROCEDURE — 31500 INSERT EMERGENCY AIRWAY: CPT | Performed by: NURSE PRACTITIONER

## 2022-01-01 PROCEDURE — 99232 SBSQ HOSP IP/OBS MODERATE 35: CPT | Performed by: NURSE PRACTITIONER

## 2022-01-01 PROCEDURE — 85025 COMPLETE CBC W/AUTO DIFF WBC: CPT

## 2022-01-01 PROCEDURE — 31622 DX BRONCHOSCOPE/WASH: CPT | Performed by: INTERNAL MEDICINE

## 2022-01-01 PROCEDURE — 80048 BASIC METABOLIC PNL TOTAL CA: CPT

## 2022-01-01 PROCEDURE — 06HM33Z INSERTION OF INFUSION DEVICE INTO RIGHT FEMORAL VEIN, PERCUTANEOUS APPROACH: ICD-10-PCS | Performed by: EMERGENCY MEDICINE

## 2022-01-01 PROCEDURE — 2500000003 HC RX 250 WO HCPCS: Performed by: NURSE PRACTITIONER

## 2022-01-01 PROCEDURE — 2500000003 HC RX 250 WO HCPCS: Performed by: EMERGENCY MEDICINE

## 2022-01-01 PROCEDURE — 1250000000 HC SEMI PRIVATE HOSPICE R&B

## 2022-01-01 PROCEDURE — 87641 MR-STAPH DNA AMP PROBE: CPT

## 2022-01-01 PROCEDURE — 81001 URINALYSIS AUTO W/SCOPE: CPT

## 2022-01-01 PROCEDURE — 2580000003 HC RX 258: Performed by: EMERGENCY MEDICINE

## 2022-01-01 PROCEDURE — APPNB30 APP NON BILLABLE TIME 0-30 MINS

## 2022-01-01 PROCEDURE — 85027 COMPLETE CBC AUTOMATED: CPT

## 2022-01-01 PROCEDURE — 5A1945Z RESPIRATORY VENTILATION, 24-96 CONSECUTIVE HOURS: ICD-10-PCS | Performed by: EMERGENCY MEDICINE

## 2022-01-01 PROCEDURE — 99291 CRITICAL CARE FIRST HOUR: CPT | Performed by: NURSE PRACTITIONER

## 2022-01-01 PROCEDURE — 87070 CULTURE OTHR SPECIMN AEROBIC: CPT

## 2022-01-01 PROCEDURE — 87449 NOS EACH ORGANISM AG IA: CPT

## 2022-01-01 PROCEDURE — 99223 1ST HOSP IP/OBS HIGH 75: CPT | Performed by: INTERNAL MEDICINE

## 2022-01-01 PROCEDURE — 83615 LACTATE (LD) (LDH) ENZYME: CPT

## 2022-01-01 PROCEDURE — 36556 INSERT NON-TUNNEL CV CATH: CPT

## 2022-01-01 PROCEDURE — 5A1945Z RESPIRATORY VENTILATION, 24-96 CONSECUTIVE HOURS: ICD-10-PCS | Performed by: STUDENT IN AN ORGANIZED HEALTH CARE EDUCATION/TRAINING PROGRAM

## 2022-01-01 PROCEDURE — 93306 TTE W/DOPPLER COMPLETE: CPT

## 2022-01-01 PROCEDURE — 93005 ELECTROCARDIOGRAM TRACING: CPT | Performed by: INTERNAL MEDICINE

## 2022-01-01 PROCEDURE — APPNB60 APP NON BILLABLE TIME 46-60 MINS: Performed by: NURSE PRACTITIONER

## 2022-01-01 PROCEDURE — 72125 CT NECK SPINE W/O DYE: CPT

## 2022-01-01 PROCEDURE — 87040 BLOOD CULTURE FOR BACTERIA: CPT

## 2022-01-01 PROCEDURE — 95819 EEG AWAKE AND ASLEEP: CPT

## 2022-01-01 PROCEDURE — 70450 CT HEAD/BRAIN W/O DYE: CPT

## 2022-01-01 PROCEDURE — 82550 ASSAY OF CK (CPK): CPT

## 2022-01-01 PROCEDURE — 5A1945Z RESPIRATORY VENTILATION, 24-96 CONSECUTIVE HOURS: ICD-10-PCS | Performed by: INTERNAL MEDICINE

## 2022-01-01 PROCEDURE — 87205 SMEAR GRAM STAIN: CPT

## 2022-01-01 PROCEDURE — 87077 CULTURE AEROBIC IDENTIFY: CPT

## 2022-01-01 PROCEDURE — 99292 CRITICAL CARE ADDL 30 MIN: CPT | Performed by: INTERNAL MEDICINE

## 2022-01-01 PROCEDURE — 93010 ELECTROCARDIOGRAM REPORT: CPT | Performed by: INTERNAL MEDICINE

## 2022-01-01 PROCEDURE — 80069 RENAL FUNCTION PANEL: CPT

## 2022-01-01 PROCEDURE — 0B21XEZ CHANGE ENDOTRACHEAL AIRWAY IN TRACHEA, EXTERNAL APPROACH: ICD-10-PCS | Performed by: INTERNAL MEDICINE

## 2022-01-01 RX ORDER — VECURONIUM BROMIDE 1 MG/ML
10 INJECTION, POWDER, LYOPHILIZED, FOR SOLUTION INTRAVENOUS ONCE
Status: COMPLETED | OUTPATIENT
Start: 2022-01-01 | End: 2022-01-01

## 2022-01-01 RX ORDER — MORPHINE SULFATE 2 MG/ML
2 INJECTION, SOLUTION INTRAMUSCULAR; INTRAVENOUS
Status: DISCONTINUED | OUTPATIENT
Start: 2022-01-01 | End: 2022-01-01 | Stop reason: HOSPADM

## 2022-01-01 RX ORDER — INSULIN LISPRO 100 [IU]/ML
0-8 INJECTION, SOLUTION INTRAVENOUS; SUBCUTANEOUS EVERY 4 HOURS
Status: DISCONTINUED | OUTPATIENT
Start: 2022-01-01 | End: 2022-01-01

## 2022-01-01 RX ORDER — POLYETHYLENE GLYCOL 3350 17 G/17G
17 POWDER, FOR SOLUTION ORAL DAILY
Status: DISCONTINUED | OUTPATIENT
Start: 2022-01-01 | End: 2022-01-01 | Stop reason: HOSPADM

## 2022-01-01 RX ORDER — PREGABALIN 25 MG/1
50 CAPSULE ORAL 3 TIMES DAILY
Status: DISCONTINUED | OUTPATIENT
Start: 2022-01-01 | End: 2022-01-01 | Stop reason: HOSPADM

## 2022-01-01 RX ORDER — ACETAMINOPHEN 500 MG
1000 TABLET ORAL EVERY 6 HOURS PRN
COMMUNITY

## 2022-01-01 RX ORDER — TAMSULOSIN HYDROCHLORIDE 0.4 MG/1
0.4 CAPSULE ORAL NIGHTLY
Status: DISCONTINUED | OUTPATIENT
Start: 2022-01-01 | End: 2022-01-01 | Stop reason: HOSPADM

## 2022-01-01 RX ORDER — PROPOFOL 10 MG/ML
10 INJECTION, EMULSION INTRAVENOUS CONTINUOUS
Status: DISCONTINUED | OUTPATIENT
Start: 2022-01-01 | End: 2022-01-01

## 2022-01-01 RX ORDER — SODIUM CHLORIDE 9 MG/ML
INJECTION, SOLUTION INTRAVENOUS PRN
Status: DISCONTINUED | OUTPATIENT
Start: 2022-01-01 | End: 2022-01-01 | Stop reason: HOSPADM

## 2022-01-01 RX ORDER — SENNA PLUS 8.6 MG/1
1 TABLET ORAL NIGHTLY
Status: DISCONTINUED | OUTPATIENT
Start: 2022-01-01 | End: 2022-01-01 | Stop reason: HOSPADM

## 2022-01-01 RX ORDER — DEXTROSE MONOHYDRATE 100 MG/ML
INJECTION, SOLUTION INTRAVENOUS CONTINUOUS PRN
Status: DISCONTINUED | OUTPATIENT
Start: 2022-01-01 | End: 2022-01-01 | Stop reason: HOSPADM

## 2022-01-01 RX ORDER — ACETAMINOPHEN 325 MG/1
650 TABLET ORAL EVERY 6 HOURS PRN
Status: DISCONTINUED | OUTPATIENT
Start: 2022-01-01 | End: 2022-01-01 | Stop reason: HOSPADM

## 2022-01-01 RX ORDER — SODIUM CHLORIDE 0.9 % (FLUSH) 0.9 %
5-40 SYRINGE (ML) INJECTION PRN
Status: DISCONTINUED | OUTPATIENT
Start: 2022-01-01 | End: 2022-01-01 | Stop reason: SDUPTHER

## 2022-01-01 RX ORDER — POLYETHYLENE GLYCOL 3350 17 G/17G
17 POWDER, FOR SOLUTION ORAL DAILY PRN
Status: DISCONTINUED | OUTPATIENT
Start: 2022-01-01 | End: 2022-01-01 | Stop reason: SDUPTHER

## 2022-01-01 RX ORDER — LORAZEPAM 0.5 MG/1
0.5 TABLET ORAL EVERY 8 HOURS PRN
COMMUNITY

## 2022-01-01 RX ORDER — TAMSULOSIN HYDROCHLORIDE 0.4 MG/1
0.4 CAPSULE ORAL NIGHTLY
COMMUNITY

## 2022-01-01 RX ORDER — METRONIDAZOLE 500 MG/100ML
500 INJECTION, SOLUTION INTRAVENOUS EVERY 8 HOURS
Status: DISCONTINUED | OUTPATIENT
Start: 2022-01-01 | End: 2022-01-01

## 2022-01-01 RX ORDER — 0.9 % SODIUM CHLORIDE 0.9 %
30 INTRAVENOUS SOLUTION INTRAVENOUS ONCE
Status: DISCONTINUED | OUTPATIENT
Start: 2022-01-01 | End: 2022-01-01

## 2022-01-01 RX ORDER — CALCIUM GLUCONATE 20 MG/ML
1000 INJECTION, SOLUTION INTRAVENOUS ONCE
Status: COMPLETED | OUTPATIENT
Start: 2022-01-01 | End: 2022-01-01

## 2022-01-01 RX ORDER — OXYCODONE HYDROCHLORIDE 5 MG/1
5 TABLET ORAL EVERY 6 HOURS PRN
COMMUNITY

## 2022-01-01 RX ORDER — AZITHROMYCIN 250 MG/1
250 TABLET, FILM COATED ORAL
COMMUNITY

## 2022-01-01 RX ORDER — LINEZOLID 2 MG/ML
600 INJECTION, SOLUTION INTRAVENOUS ONCE
Status: COMPLETED | OUTPATIENT
Start: 2022-01-01 | End: 2022-01-01

## 2022-01-01 RX ORDER — LINEZOLID 2 MG/ML
600 INJECTION, SOLUTION INTRAVENOUS EVERY 12 HOURS
Status: DISCONTINUED | OUTPATIENT
Start: 2022-01-01 | End: 2022-01-01

## 2022-01-01 RX ORDER — PREGABALIN 25 MG/1
50 CAPSULE ORAL 3 TIMES DAILY
Status: CANCELLED | OUTPATIENT
Start: 2022-01-01

## 2022-01-01 RX ORDER — PROPOFOL 10 MG/ML
5-50 INJECTION, EMULSION INTRAVENOUS CONTINUOUS
Status: DISCONTINUED | OUTPATIENT
Start: 2022-01-01 | End: 2022-01-01

## 2022-01-01 RX ORDER — ACETAMINOPHEN 650 MG/1
650 SUPPOSITORY RECTAL EVERY 6 HOURS PRN
Status: DISCONTINUED | OUTPATIENT
Start: 2022-01-01 | End: 2022-01-01 | Stop reason: HOSPADM

## 2022-01-01 RX ORDER — LORAZEPAM 2 MG/ML
4 INJECTION INTRAMUSCULAR ONCE
Status: COMPLETED | OUTPATIENT
Start: 2022-01-01 | End: 2022-01-01

## 2022-01-01 RX ORDER — METHYLPREDNISOLONE SODIUM SUCCINATE 40 MG/ML
40 INJECTION, POWDER, LYOPHILIZED, FOR SOLUTION INTRAMUSCULAR; INTRAVENOUS EVERY 12 HOURS
Status: DISCONTINUED | OUTPATIENT
Start: 2022-01-01 | End: 2022-01-01

## 2022-01-01 RX ORDER — MAGNESIUM SULFATE IN WATER 40 MG/ML
2000 INJECTION, SOLUTION INTRAVENOUS ONCE
Status: COMPLETED | OUTPATIENT
Start: 2022-01-01 | End: 2022-01-01

## 2022-01-01 RX ORDER — MORPHINE SULFATE 4 MG/ML
4 INJECTION, SOLUTION INTRAMUSCULAR; INTRAVENOUS ONCE
Status: DISCONTINUED | OUTPATIENT
Start: 2022-01-01 | End: 2022-01-01 | Stop reason: HOSPADM

## 2022-01-01 RX ORDER — SCOLOPAMINE TRANSDERMAL SYSTEM 1 MG/1
1 PATCH, EXTENDED RELEASE TRANSDERMAL
Status: DISCONTINUED | OUTPATIENT
Start: 2022-01-01 | End: 2022-01-01 | Stop reason: HOSPADM

## 2022-01-01 RX ORDER — HYDROXYZINE HYDROCHLORIDE 25 MG/1
25 TABLET, FILM COATED ORAL 3 TIMES DAILY
COMMUNITY

## 2022-01-01 RX ORDER — NALOXONE HYDROCHLORIDE 1 MG/ML
2 INJECTION INTRAMUSCULAR; INTRAVENOUS; SUBCUTANEOUS ONCE
Status: COMPLETED | OUTPATIENT
Start: 2022-01-01 | End: 2022-01-01

## 2022-01-01 RX ORDER — INSULIN LISPRO 100 [IU]/ML
0-4 INJECTION, SOLUTION INTRAVENOUS; SUBCUTANEOUS
Status: DISCONTINUED | OUTPATIENT
Start: 2022-01-01 | End: 2022-01-01

## 2022-01-01 RX ORDER — ROCURONIUM BROMIDE 10 MG/ML
12 INJECTION, SOLUTION INTRAVENOUS ONCE
Status: DISCONTINUED | OUTPATIENT
Start: 2022-01-01 | End: 2022-01-01

## 2022-01-01 RX ORDER — BUDESONIDE 0.5 MG/2ML
1 INHALANT ORAL 2 TIMES DAILY
Status: DISCONTINUED | OUTPATIENT
Start: 2022-01-01 | End: 2022-01-01 | Stop reason: HOSPADM

## 2022-01-01 RX ORDER — LACTOBACILLUS RHAMNOSUS GG 10B CELL
2 CAPSULE ORAL 2 TIMES DAILY WITH MEALS
Status: DISCONTINUED | OUTPATIENT
Start: 2022-01-01 | End: 2022-01-01 | Stop reason: HOSPADM

## 2022-01-01 RX ORDER — FUROSEMIDE 10 MG/ML
20 INJECTION INTRAMUSCULAR; INTRAVENOUS 2 TIMES DAILY
Status: DISCONTINUED | OUTPATIENT
Start: 2022-01-01 | End: 2022-01-01 | Stop reason: HOSPADM

## 2022-01-01 RX ORDER — FENTANYL CITRATE 50 UG/ML
50 INJECTION, SOLUTION INTRAMUSCULAR; INTRAVENOUS
Status: DISCONTINUED | OUTPATIENT
Start: 2022-01-01 | End: 2022-01-01

## 2022-01-01 RX ORDER — IPRATROPIUM BROMIDE AND ALBUTEROL SULFATE 2.5; .5 MG/3ML; MG/3ML
1 SOLUTION RESPIRATORY (INHALATION) EVERY 4 HOURS
Status: DISCONTINUED | OUTPATIENT
Start: 2022-01-01 | End: 2022-01-01 | Stop reason: HOSPADM

## 2022-01-01 RX ORDER — CHLORHEXIDINE GLUCONATE 0.12 MG/ML
15 RINSE ORAL 2 TIMES DAILY
Status: DISCONTINUED | OUTPATIENT
Start: 2022-01-01 | End: 2022-01-01

## 2022-01-01 RX ORDER — PREGABALIN 50 MG/1
50 CAPSULE ORAL 3 TIMES DAILY
COMMUNITY

## 2022-01-01 RX ORDER — METHYLPREDNISOLONE SODIUM SUCCINATE 40 MG/ML
40 INJECTION, POWDER, LYOPHILIZED, FOR SOLUTION INTRAMUSCULAR; INTRAVENOUS EVERY 12 HOURS
Status: DISCONTINUED | OUTPATIENT
Start: 2022-01-01 | End: 2022-01-01 | Stop reason: HOSPADM

## 2022-01-01 RX ORDER — WATER 1000 ML/1000ML
INJECTION, SOLUTION INTRAVENOUS
Status: COMPLETED
Start: 2022-01-01 | End: 2022-01-01

## 2022-01-01 RX ORDER — INSULIN LISPRO 100 [IU]/ML
0-8 INJECTION, SOLUTION INTRAVENOUS; SUBCUTANEOUS EVERY 4 HOURS
Status: DISCONTINUED | OUTPATIENT
Start: 2022-01-01 | End: 2022-01-01 | Stop reason: HOSPADM

## 2022-01-01 RX ORDER — LORAZEPAM 2 MG/ML
1 CONCENTRATE ORAL EVERY 8 HOURS PRN
Status: DISCONTINUED | OUTPATIENT
Start: 2022-01-01 | End: 2022-01-01

## 2022-01-01 RX ORDER — SODIUM CHLORIDE 0.9 % (FLUSH) 0.9 %
5-40 SYRINGE (ML) INJECTION EVERY 12 HOURS SCHEDULED
Status: DISCONTINUED | OUTPATIENT
Start: 2022-01-01 | End: 2022-01-01 | Stop reason: SDUPTHER

## 2022-01-01 RX ORDER — FENTANYL CITRATE-0.9 % NACL/PF 10 MCG/ML
25-200 PLASTIC BAG, INJECTION (ML) INTRAVENOUS CONTINUOUS
Status: DISCONTINUED | OUTPATIENT
Start: 2022-01-01 | End: 2022-01-01

## 2022-01-01 RX ORDER — MIDAZOLAM HYDROCHLORIDE 1 MG/ML
1-10 INJECTION, SOLUTION INTRAVENOUS CONTINUOUS
Status: DISCONTINUED | OUTPATIENT
Start: 2022-01-01 | End: 2022-01-01

## 2022-01-01 RX ORDER — HYDRALAZINE HYDROCHLORIDE 20 MG/ML
5 INJECTION INTRAMUSCULAR; INTRAVENOUS EVERY 6 HOURS PRN
Status: DISCONTINUED | OUTPATIENT
Start: 2022-01-01 | End: 2022-01-01 | Stop reason: HOSPADM

## 2022-01-01 RX ORDER — LORAZEPAM 2 MG/ML
1 CONCENTRATE ORAL
Status: DISCONTINUED | OUTPATIENT
Start: 2022-01-01 | End: 2022-01-01 | Stop reason: HOSPADM

## 2022-01-01 RX ORDER — MIDAZOLAM HYDROCHLORIDE 1 MG/ML
4 INJECTION INTRAMUSCULAR; INTRAVENOUS ONCE
Status: COMPLETED | OUTPATIENT
Start: 2022-01-01 | End: 2022-01-01

## 2022-01-01 RX ORDER — LEVETIRACETAM 10 MG/ML
1000 INJECTION INTRAVASCULAR EVERY 12 HOURS
Status: DISCONTINUED | OUTPATIENT
Start: 2022-01-01 | End: 2022-01-01 | Stop reason: HOSPADM

## 2022-01-01 RX ORDER — ROCURONIUM BROMIDE 10 MG/ML
50 INJECTION, SOLUTION INTRAVENOUS ONCE
Status: COMPLETED | OUTPATIENT
Start: 2022-01-01 | End: 2022-01-01

## 2022-01-01 RX ORDER — SODIUM CHLORIDE 9 MG/ML
INJECTION, SOLUTION INTRAVENOUS PRN
Status: DISCONTINUED | OUTPATIENT
Start: 2022-01-01 | End: 2022-01-01 | Stop reason: SDUPTHER

## 2022-01-01 RX ORDER — ONDANSETRON 2 MG/ML
4 INJECTION INTRAMUSCULAR; INTRAVENOUS EVERY 6 HOURS PRN
Status: DISCONTINUED | OUTPATIENT
Start: 2022-01-01 | End: 2022-01-01 | Stop reason: HOSPADM

## 2022-01-01 RX ORDER — NICOTINE 21 MG/24HR
1 PATCH, TRANSDERMAL 24 HOURS TRANSDERMAL EVERY 24 HOURS
COMMUNITY

## 2022-01-01 RX ORDER — DEXMEDETOMIDINE HYDROCHLORIDE 4 UG/ML
.2-1.4 INJECTION, SOLUTION INTRAVENOUS CONTINUOUS
Status: DISCONTINUED | OUTPATIENT
Start: 2022-01-01 | End: 2022-01-01

## 2022-01-01 RX ORDER — SODIUM CHLORIDE 0.9 % (FLUSH) 0.9 %
5-40 SYRINGE (ML) INJECTION EVERY 12 HOURS SCHEDULED
Status: DISCONTINUED | OUTPATIENT
Start: 2022-01-01 | End: 2022-01-01 | Stop reason: HOSPADM

## 2022-01-01 RX ORDER — NALOXONE HYDROCHLORIDE 0.4 MG/ML
0.4 INJECTION, SOLUTION INTRAMUSCULAR; INTRAVENOUS; SUBCUTANEOUS PRN
Status: DISCONTINUED | OUTPATIENT
Start: 2022-01-01 | End: 2022-01-01

## 2022-01-01 RX ORDER — SODIUM CHLORIDE 0.9 % (FLUSH) 0.9 %
5-40 SYRINGE (ML) INJECTION PRN
Status: DISCONTINUED | OUTPATIENT
Start: 2022-01-01 | End: 2022-01-01 | Stop reason: HOSPADM

## 2022-01-01 RX ORDER — BUDESONIDE 0.5 MG/2ML
0.5 INHALANT ORAL 2 TIMES DAILY
Status: DISCONTINUED | OUTPATIENT
Start: 2022-01-01 | End: 2022-01-01

## 2022-01-01 RX ORDER — PANTOPRAZOLE SODIUM 40 MG/1
40 TABLET, DELAYED RELEASE ORAL DAILY
COMMUNITY

## 2022-01-01 RX ORDER — MONTELUKAST SODIUM 10 MG/1
10 TABLET ORAL NIGHTLY
COMMUNITY

## 2022-01-01 RX ORDER — BUPROPION HYDROCHLORIDE 300 MG/1
300 TABLET ORAL EVERY MORNING
COMMUNITY

## 2022-01-01 RX ORDER — ENOXAPARIN SODIUM 100 MG/ML
40 INJECTION SUBCUTANEOUS DAILY
Status: CANCELLED | OUTPATIENT
Start: 2022-01-01

## 2022-01-01 RX ORDER — ONDANSETRON 4 MG/1
4 TABLET, ORALLY DISINTEGRATING ORAL EVERY 8 HOURS PRN
Status: DISCONTINUED | OUTPATIENT
Start: 2022-01-01 | End: 2022-01-01 | Stop reason: HOSPADM

## 2022-01-01 RX ORDER — MIDAZOLAM HYDROCHLORIDE 1 MG/ML
2 INJECTION INTRAMUSCULAR; INTRAVENOUS
Status: DISCONTINUED | OUTPATIENT
Start: 2022-01-01 | End: 2022-01-01

## 2022-01-01 RX ORDER — POLYETHYLENE GLYCOL 3350 17 G/17G
17 POWDER, FOR SOLUTION ORAL DAILY PRN
Status: DISCONTINUED | OUTPATIENT
Start: 2022-01-01 | End: 2022-01-01 | Stop reason: HOSPADM

## 2022-01-01 RX ORDER — MORPHINE SULFATE 4 MG/ML
4 INJECTION, SOLUTION INTRAMUSCULAR; INTRAVENOUS
Status: DISCONTINUED | OUTPATIENT
Start: 2022-01-01 | End: 2022-01-01 | Stop reason: HOSPADM

## 2022-01-01 RX ORDER — IPRATROPIUM BROMIDE AND ALBUTEROL SULFATE 2.5; .5 MG/3ML; MG/3ML
1 SOLUTION RESPIRATORY (INHALATION) EVERY 4 HOURS
Status: DISCONTINUED | OUTPATIENT
Start: 2022-01-01 | End: 2022-01-01

## 2022-01-01 RX ORDER — MIDAZOLAM HYDROCHLORIDE 1 MG/ML
1 INJECTION INTRAMUSCULAR; INTRAVENOUS EVERY 4 HOURS PRN
Status: DISCONTINUED | OUTPATIENT
Start: 2022-01-01 | End: 2022-01-01 | Stop reason: HOSPADM

## 2022-01-01 RX ORDER — PREGABALIN 50 MG/1
50 CAPSULE ORAL 3 TIMES DAILY
Status: DISCONTINUED | OUTPATIENT
Start: 2022-01-01 | End: 2022-01-01 | Stop reason: HOSPADM

## 2022-01-01 RX ORDER — BUPROPION HYDROCHLORIDE 150 MG/1
300 TABLET ORAL EVERY MORNING
Status: DISCONTINUED | OUTPATIENT
Start: 2022-01-01 | End: 2022-01-01

## 2022-01-01 RX ORDER — SENNA LEAF EXTRACT 176MG/5ML
10 SYRUP ORAL NIGHTLY
Status: DISCONTINUED | OUTPATIENT
Start: 2022-01-01 | End: 2022-01-01 | Stop reason: HOSPADM

## 2022-01-01 RX ORDER — IPRATROPIUM BROMIDE AND ALBUTEROL SULFATE 2.5; .5 MG/3ML; MG/3ML
1 SOLUTION RESPIRATORY (INHALATION) EVERY 6 HOURS PRN
COMMUNITY

## 2022-01-01 RX ORDER — MAGNESIUM SULFATE IN WATER 40 MG/ML
4000 INJECTION, SOLUTION INTRAVENOUS ONCE
Status: DISCONTINUED | OUTPATIENT
Start: 2022-01-01 | End: 2022-01-01

## 2022-01-01 RX ORDER — POTASSIUM CHLORIDE 29.8 MG/ML
20 INJECTION INTRAVENOUS ONCE
Status: COMPLETED | OUTPATIENT
Start: 2022-01-01 | End: 2022-01-01

## 2022-01-01 RX ORDER — CHLORHEXIDINE GLUCONATE 0.12 MG/ML
15 RINSE ORAL 2 TIMES DAILY
Status: DISCONTINUED | OUTPATIENT
Start: 2022-01-01 | End: 2022-01-01 | Stop reason: SDUPTHER

## 2022-01-01 RX ORDER — IPRATROPIUM BROMIDE AND ALBUTEROL SULFATE 2.5; .5 MG/3ML; MG/3ML
1 SOLUTION RESPIRATORY (INHALATION) 4 TIMES DAILY
Status: DISCONTINUED | OUTPATIENT
Start: 2022-01-01 | End: 2022-01-01 | Stop reason: HOSPADM

## 2022-01-01 RX ORDER — PREDNISONE 20 MG/1
20 TABLET ORAL SEE ADMIN INSTRUCTIONS
COMMUNITY

## 2022-01-01 RX ORDER — METHYLPREDNISOLONE SODIUM SUCCINATE 125 MG/2ML
60 INJECTION, POWDER, LYOPHILIZED, FOR SOLUTION INTRAMUSCULAR; INTRAVENOUS EVERY 6 HOURS
Status: DISCONTINUED | OUTPATIENT
Start: 2022-01-01 | End: 2022-01-01

## 2022-01-01 RX ORDER — TAMSULOSIN HYDROCHLORIDE 0.4 MG/1
0.4 CAPSULE ORAL NIGHTLY
Status: CANCELLED | OUTPATIENT
Start: 2022-01-01

## 2022-01-01 RX ORDER — SODIUM CHLORIDE 9 MG/ML
INJECTION, SOLUTION INTRAVENOUS CONTINUOUS
Status: DISCONTINUED | OUTPATIENT
Start: 2022-01-01 | End: 2022-01-01

## 2022-01-01 RX ORDER — ACETAMINOPHEN 650 MG/1
650 SUPPOSITORY RECTAL EVERY 6 HOURS PRN
Status: DISCONTINUED | OUTPATIENT
Start: 2022-01-01 | End: 2022-01-01 | Stop reason: SDUPTHER

## 2022-01-01 RX ORDER — LABETALOL HYDROCHLORIDE 5 MG/ML
5 INJECTION, SOLUTION INTRAVENOUS ONCE
Status: COMPLETED | OUTPATIENT
Start: 2022-01-01 | End: 2022-01-01

## 2022-01-01 RX ORDER — BUPROPION HYDROCHLORIDE 150 MG/1
300 TABLET ORAL EVERY MORNING
Status: DISCONTINUED | OUTPATIENT
Start: 2022-01-01 | End: 2022-01-01 | Stop reason: HOSPADM

## 2022-01-01 RX ORDER — FENTANYL CITRATE-0.9 % NACL/PF 10 MCG/ML
50 PLASTIC BAG, INJECTION (ML) INTRAVENOUS EVERY 30 MIN PRN
Status: DISCONTINUED | OUTPATIENT
Start: 2022-01-01 | End: 2022-01-01

## 2022-01-01 RX ORDER — ENOXAPARIN SODIUM 100 MG/ML
40 INJECTION SUBCUTANEOUS DAILY
Status: DISCONTINUED | OUTPATIENT
Start: 2022-01-01 | End: 2022-01-01 | Stop reason: HOSPADM

## 2022-01-01 RX ORDER — NALOXONE HYDROCHLORIDE 0.4 MG/ML
INJECTION, SOLUTION INTRAMUSCULAR; INTRAVENOUS; SUBCUTANEOUS
Status: COMPLETED
Start: 2022-01-01 | End: 2022-01-01

## 2022-01-01 RX ORDER — HEPARIN SODIUM 5000 [USP'U]/ML
5000 INJECTION, SOLUTION INTRAVENOUS; SUBCUTANEOUS EVERY 8 HOURS SCHEDULED
Status: DISCONTINUED | OUTPATIENT
Start: 2022-01-01 | End: 2022-01-01

## 2022-01-01 RX ORDER — ALBUTEROL SULFATE 90 UG/1
4 AEROSOL, METERED RESPIRATORY (INHALATION) EVERY 4 HOURS PRN
Status: DISCONTINUED | OUTPATIENT
Start: 2022-01-01 | End: 2022-01-01

## 2022-01-01 RX ORDER — FENTANYL CITRATE 50 UG/ML
25 INJECTION, SOLUTION INTRAMUSCULAR; INTRAVENOUS ONCE
Status: COMPLETED | OUTPATIENT
Start: 2022-01-01 | End: 2022-01-01

## 2022-01-01 RX ORDER — MONTELUKAST SODIUM 10 MG/1
10 TABLET ORAL NIGHTLY
Status: DISCONTINUED | OUTPATIENT
Start: 2022-01-01 | End: 2022-01-01

## 2022-01-01 RX ORDER — ACETAMINOPHEN 325 MG/1
650 TABLET ORAL EVERY 6 HOURS PRN
Status: DISCONTINUED | OUTPATIENT
Start: 2022-01-01 | End: 2022-01-01 | Stop reason: SDUPTHER

## 2022-01-01 RX ORDER — 0.9 % SODIUM CHLORIDE 0.9 %
1000 INTRAVENOUS SOLUTION INTRAVENOUS ONCE
Status: COMPLETED | OUTPATIENT
Start: 2022-01-01 | End: 2022-01-01

## 2022-01-01 RX ORDER — LORAZEPAM 2 MG/ML
2 INJECTION INTRAMUSCULAR
Status: DISCONTINUED | OUTPATIENT
Start: 2022-01-01 | End: 2022-01-01 | Stop reason: HOSPADM

## 2022-01-01 RX ADMIN — SODIUM CHLORIDE, PRESERVATIVE FREE 10 ML: 5 INJECTION INTRAVENOUS at 08:47

## 2022-01-01 RX ADMIN — FUROSEMIDE 20 MG: 10 INJECTION, SOLUTION INTRAMUSCULAR; INTRAVENOUS at 17:18

## 2022-01-01 RX ADMIN — SODIUM CHLORIDE, PRESERVATIVE FREE 10 ML: 5 INJECTION INTRAVENOUS at 21:38

## 2022-01-01 RX ADMIN — IPRATROPIUM BROMIDE AND ALBUTEROL SULFATE 1 AMPULE: .5; 3 SOLUTION RESPIRATORY (INHALATION) at 00:25

## 2022-01-01 RX ADMIN — IPRATROPIUM BROMIDE AND ALBUTEROL SULFATE 1 AMPULE: .5; 3 SOLUTION RESPIRATORY (INHALATION) at 19:45

## 2022-01-01 RX ADMIN — DEXTROSE MONOHYDRATE 200 MG: 50 INJECTION, SOLUTION INTRAVENOUS at 15:26

## 2022-01-01 RX ADMIN — METHYLPREDNISOLONE SODIUM SUCCINATE 60 MG: 125 INJECTION, POWDER, FOR SOLUTION INTRAMUSCULAR; INTRAVENOUS at 02:15

## 2022-01-01 RX ADMIN — APIXABAN 5 MG: 5 TABLET, FILM COATED ORAL at 08:40

## 2022-01-01 RX ADMIN — DEXTROSE MONOHYDRATE 100 MG: 50 INJECTION, SOLUTION INTRAVENOUS at 13:51

## 2022-01-01 RX ADMIN — IPRATROPIUM BROMIDE AND ALBUTEROL SULFATE 1 AMPULE: .5; 3 SOLUTION RESPIRATORY (INHALATION) at 15:25

## 2022-01-01 RX ADMIN — MUPIROCIN: 20 OINTMENT TOPICAL at 08:43

## 2022-01-01 RX ADMIN — SODIUM CHLORIDE 1000 MG: 9 INJECTION, SOLUTION INTRAVENOUS at 09:52

## 2022-01-01 RX ADMIN — BUDESONIDE 1000 MCG: 0.5 SUSPENSION RESPIRATORY (INHALATION) at 20:15

## 2022-01-01 RX ADMIN — BUDESONIDE 1000 MCG: 0.5 SUSPENSION RESPIRATORY (INHALATION) at 19:32

## 2022-01-01 RX ADMIN — IOPAMIDOL 75 ML: 755 INJECTION, SOLUTION INTRAVENOUS at 16:01

## 2022-01-01 RX ADMIN — PREGABALIN 50 MG: 25 CAPSULE ORAL at 13:24

## 2022-01-01 RX ADMIN — PROPOFOL 30 MCG/KG/MIN: 10 INJECTION, EMULSION INTRAVENOUS at 03:53

## 2022-01-01 RX ADMIN — FENTANYL CITRATE 50 MCG: 50 INJECTION, SOLUTION INTRAMUSCULAR; INTRAVENOUS at 20:21

## 2022-01-01 RX ADMIN — FENTANYL CITRATE 50 MCG: 50 INJECTION, SOLUTION INTRAMUSCULAR; INTRAVENOUS at 04:06

## 2022-01-01 RX ADMIN — SULFAMETHOXAZOLE AND TRIMETHOPRIM 352 MG: 80; 16 INJECTION, SOLUTION, CONCENTRATE INTRAVENOUS at 09:53

## 2022-01-01 RX ADMIN — HEPARIN SODIUM 5000 UNITS: 5000 INJECTION INTRAVENOUS; SUBCUTANEOUS at 21:25

## 2022-01-01 RX ADMIN — IPRATROPIUM BROMIDE AND ALBUTEROL SULFATE 1 AMPULE: .5; 3 SOLUTION RESPIRATORY (INHALATION) at 23:57

## 2022-01-01 RX ADMIN — SULFAMETHOXAZOLE AND TRIMETHOPRIM 339.2 MG: 80; 16 INJECTION, SOLUTION, CONCENTRATE INTRAVENOUS at 04:10

## 2022-01-01 RX ADMIN — Medication 2 CAPSULE: at 08:31

## 2022-01-01 RX ADMIN — FENTANYL CITRATE 50 MCG: 50 INJECTION, SOLUTION INTRAMUSCULAR; INTRAVENOUS at 08:10

## 2022-01-01 RX ADMIN — Medication 20 MG: at 09:23

## 2022-01-01 RX ADMIN — METHYLPREDNISOLONE SODIUM SUCCINATE 60 MG: 125 INJECTION, POWDER, FOR SOLUTION INTRAMUSCULAR; INTRAVENOUS at 09:56

## 2022-01-01 RX ADMIN — METHYLPREDNISOLONE SODIUM SUCCINATE 40 MG: 40 INJECTION, POWDER, FOR SOLUTION INTRAMUSCULAR; INTRAVENOUS at 08:52

## 2022-01-01 RX ADMIN — BUDESONIDE INHALATION SUSPENSION 500 MCG: 0.5 SUSPENSION RESPIRATORY (INHALATION) at 07:44

## 2022-01-01 RX ADMIN — HYDRALAZINE HYDROCHLORIDE 5 MG: 20 INJECTION INTRAMUSCULAR; INTRAVENOUS at 15:04

## 2022-01-01 RX ADMIN — SODIUM CHLORIDE, PRESERVATIVE FREE 10 ML: 5 INJECTION INTRAVENOUS at 08:43

## 2022-01-01 RX ADMIN — SODIUM CHLORIDE 1000 MG: 9 INJECTION, SOLUTION INTRAVENOUS at 09:27

## 2022-01-01 RX ADMIN — Medication 20 MG: at 08:40

## 2022-01-01 RX ADMIN — APIXABAN 5 MG: 5 TABLET, FILM COATED ORAL at 08:45

## 2022-01-01 RX ADMIN — FUROSEMIDE 20 MG: 10 INJECTION, SOLUTION INTRAMUSCULAR; INTRAVENOUS at 17:17

## 2022-01-01 RX ADMIN — IPRATROPIUM BROMIDE AND ALBUTEROL SULFATE 1 AMPULE: .5; 3 SOLUTION RESPIRATORY (INHALATION) at 08:27

## 2022-01-01 RX ADMIN — LORAZEPAM 2 MG: 2 INJECTION INTRAMUSCULAR; INTRAVENOUS at 15:13

## 2022-01-01 RX ADMIN — Medication 20 MG: at 20:13

## 2022-01-01 RX ADMIN — MIDAZOLAM HYDROCHLORIDE 2 MG/HR: 1 INJECTION, SOLUTION INTRAVENOUS at 15:49

## 2022-01-01 RX ADMIN — SODIUM CHLORIDE, PRESERVATIVE FREE 20 ML: 5 INJECTION INTRAVENOUS at 09:51

## 2022-01-01 RX ADMIN — LORAZEPAM 2 MG: 2 INJECTION INTRAMUSCULAR; INTRAVENOUS at 20:15

## 2022-01-01 RX ADMIN — IPRATROPIUM BROMIDE AND ALBUTEROL SULFATE 1 AMPULE: 2.5; .5 SOLUTION RESPIRATORY (INHALATION) at 00:17

## 2022-01-01 RX ADMIN — HEPARIN SODIUM 5000 UNITS: 5000 INJECTION INTRAVENOUS; SUBCUTANEOUS at 20:18

## 2022-01-01 RX ADMIN — IPRATROPIUM BROMIDE AND ALBUTEROL SULFATE 1 AMPULE: .5; 3 SOLUTION RESPIRATORY (INHALATION) at 08:25

## 2022-01-01 RX ADMIN — IPRATROPIUM BROMIDE AND ALBUTEROL SULFATE 1 AMPULE: 2.5; .5 SOLUTION RESPIRATORY (INHALATION) at 08:15

## 2022-01-01 RX ADMIN — Medication 20 MG: at 09:56

## 2022-01-01 RX ADMIN — IPRATROPIUM BROMIDE AND ALBUTEROL SULFATE 1 AMPULE: 2.5; .5 SOLUTION RESPIRATORY (INHALATION) at 08:19

## 2022-01-01 RX ADMIN — DEXTROSE MONOHYDRATE 100 MG: 50 INJECTION, SOLUTION INTRAVENOUS at 13:44

## 2022-01-01 RX ADMIN — METOPROLOL TARTRATE 25 MG: 25 TABLET, FILM COATED ORAL at 08:45

## 2022-01-01 RX ADMIN — SODIUM CHLORIDE, PRESERVATIVE FREE 10 ML: 5 INJECTION INTRAVENOUS at 20:25

## 2022-01-01 RX ADMIN — APIXABAN 5 MG: 5 TABLET, FILM COATED ORAL at 20:22

## 2022-01-01 RX ADMIN — IPRATROPIUM BROMIDE AND ALBUTEROL SULFATE 1 AMPULE: .5; 3 SOLUTION RESPIRATORY (INHALATION) at 16:38

## 2022-01-01 RX ADMIN — SODIUM CHLORIDE, PRESERVATIVE FREE 10 ML: 5 INJECTION INTRAVENOUS at 20:05

## 2022-01-01 RX ADMIN — IPRATROPIUM BROMIDE AND ALBUTEROL SULFATE 1 AMPULE: 2.5; .5 SOLUTION RESPIRATORY (INHALATION) at 04:29

## 2022-01-01 RX ADMIN — FUROSEMIDE 20 MG: 10 INJECTION, SOLUTION INTRAMUSCULAR; INTRAVENOUS at 08:41

## 2022-01-01 RX ADMIN — METHYLPREDNISOLONE SODIUM SUCCINATE 40 MG: 40 INJECTION, POWDER, FOR SOLUTION INTRAMUSCULAR; INTRAVENOUS at 20:12

## 2022-01-01 RX ADMIN — IPRATROPIUM BROMIDE AND ALBUTEROL SULFATE 1 AMPULE: .5; 3 SOLUTION RESPIRATORY (INHALATION) at 04:21

## 2022-01-01 RX ADMIN — IPRATROPIUM BROMIDE AND ALBUTEROL SULFATE 1 AMPULE: .5; 3 SOLUTION RESPIRATORY (INHALATION) at 20:15

## 2022-01-01 RX ADMIN — METHYLPREDNISOLONE SODIUM SUCCINATE 40 MG: 40 INJECTION, POWDER, FOR SOLUTION INTRAMUSCULAR; INTRAVENOUS at 08:41

## 2022-01-01 RX ADMIN — BUDESONIDE 1000 MCG: 0.5 SUSPENSION RESPIRATORY (INHALATION) at 07:43

## 2022-01-01 RX ADMIN — DOCUSATE SODIUM 100 MG: 50 LIQUID ORAL at 09:23

## 2022-01-01 RX ADMIN — SODIUM CHLORIDE, PRESERVATIVE FREE 10 ML: 5 INJECTION INTRAVENOUS at 19:21

## 2022-01-01 RX ADMIN — MONTELUKAST 10 MG: 10 TABLET, FILM COATED ORAL at 20:18

## 2022-01-01 RX ADMIN — SODIUM CHLORIDE 1000 MG: 9 INJECTION, SOLUTION INTRAVENOUS at 23:18

## 2022-01-01 RX ADMIN — Medication 20 MG: at 20:23

## 2022-01-01 RX ADMIN — ACETAMINOPHEN 650 MG: 325 TABLET ORAL at 10:22

## 2022-01-01 RX ADMIN — Medication 20 MG: at 20:31

## 2022-01-01 RX ADMIN — METHYLPREDNISOLONE SODIUM SUCCINATE 40 MG: 40 INJECTION, POWDER, FOR SOLUTION INTRAMUSCULAR; INTRAVENOUS at 09:09

## 2022-01-01 RX ADMIN — PIPERACILLIN AND TAZOBACTAM 3375 MG: 3; .375 INJECTION, POWDER, FOR SOLUTION INTRAVENOUS at 09:47

## 2022-01-01 RX ADMIN — APIXABAN 5 MG: 5 TABLET, FILM COATED ORAL at 21:00

## 2022-01-01 RX ADMIN — NALOXONE HYDROCHLORIDE 2 MG: 1 INJECTION PARENTERAL at 21:59

## 2022-01-01 RX ADMIN — FUROSEMIDE 20 MG: 10 INJECTION, SOLUTION INTRAMUSCULAR; INTRAVENOUS at 08:52

## 2022-01-01 RX ADMIN — METHYLPREDNISOLONE SODIUM SUCCINATE 60 MG: 125 INJECTION, POWDER, FOR SOLUTION INTRAMUSCULAR; INTRAVENOUS at 14:12

## 2022-01-01 RX ADMIN — LEVETIRACETAM 1000 MG: 10 INJECTION INTRAVENOUS at 21:45

## 2022-01-01 RX ADMIN — BUDESONIDE INHALATION SUSPENSION 1000 MCG: 0.5 SUSPENSION RESPIRATORY (INHALATION) at 20:04

## 2022-01-01 RX ADMIN — LORAZEPAM 2 MG: 2 INJECTION INTRAMUSCULAR; INTRAVENOUS at 10:51

## 2022-01-01 RX ADMIN — MUPIROCIN: 20 OINTMENT TOPICAL at 21:37

## 2022-01-01 RX ADMIN — IPRATROPIUM BROMIDE AND ALBUTEROL SULFATE 1 AMPULE: 2.5; .5 SOLUTION RESPIRATORY (INHALATION) at 12:09

## 2022-01-01 RX ADMIN — METHYLPREDNISOLONE SODIUM SUCCINATE 60 MG: 125 INJECTION, POWDER, FOR SOLUTION INTRAMUSCULAR; INTRAVENOUS at 20:31

## 2022-01-01 RX ADMIN — METHYLPREDNISOLONE SODIUM SUCCINATE 60 MG: 125 INJECTION, POWDER, FOR SOLUTION INTRAMUSCULAR; INTRAVENOUS at 02:30

## 2022-01-01 RX ADMIN — Medication 20 MG: at 08:31

## 2022-01-01 RX ADMIN — BUDESONIDE 1000 MCG: 0.5 SUSPENSION RESPIRATORY (INHALATION) at 08:22

## 2022-01-01 RX ADMIN — SODIUM CHLORIDE, PRESERVATIVE FREE 10 ML: 5 INJECTION INTRAVENOUS at 19:38

## 2022-01-01 RX ADMIN — POLYETHYLENE GLYCOL 3350 17 G: 17 POWDER, FOR SOLUTION ORAL at 08:32

## 2022-01-01 RX ADMIN — PREGABALIN 50 MG: 50 CAPSULE ORAL at 09:47

## 2022-01-01 RX ADMIN — POLYETHYLENE GLYCOL 3350 17 G: 17 POWDER, FOR SOLUTION ORAL at 08:47

## 2022-01-01 RX ADMIN — IPRATROPIUM BROMIDE AND ALBUTEROL SULFATE 1 AMPULE: .5; 3 SOLUTION RESPIRATORY (INHALATION) at 03:46

## 2022-01-01 RX ADMIN — Medication 2 CAPSULE: at 09:09

## 2022-01-01 RX ADMIN — SULFAMETHOXAZOLE AND TRIMETHOPRIM 339.2 MG: 80; 16 INJECTION, SOLUTION, CONCENTRATE INTRAVENOUS at 15:23

## 2022-01-01 RX ADMIN — SULFAMETHOXAZOLE AND TRIMETHOPRIM 339.2 MG: 80; 16 INJECTION, SOLUTION, CONCENTRATE INTRAVENOUS at 03:56

## 2022-01-01 RX ADMIN — LORAZEPAM 2 MG: 2 INJECTION INTRAMUSCULAR; INTRAVENOUS at 19:49

## 2022-01-01 RX ADMIN — Medication 2 CAPSULE: at 08:45

## 2022-01-01 RX ADMIN — FENTANYL CITRATE 50 MCG: 50 INJECTION, SOLUTION INTRAMUSCULAR; INTRAVENOUS at 21:55

## 2022-01-01 RX ADMIN — MORPHINE SULFATE 4 MG: 4 INJECTION INTRAVENOUS at 20:14

## 2022-01-01 RX ADMIN — IPRATROPIUM BROMIDE AND ALBUTEROL SULFATE 1 AMPULE: .5; 3 SOLUTION RESPIRATORY (INHALATION) at 19:40

## 2022-01-01 RX ADMIN — Medication 2 CAPSULE: at 09:22

## 2022-01-01 RX ADMIN — IPRATROPIUM BROMIDE AND ALBUTEROL SULFATE 1 AMPULE: .5; 3 SOLUTION RESPIRATORY (INHALATION) at 11:54

## 2022-01-01 RX ADMIN — IPRATROPIUM BROMIDE AND ALBUTEROL SULFATE 1 AMPULE: .5; 3 SOLUTION RESPIRATORY (INHALATION) at 23:50

## 2022-01-01 RX ADMIN — VECURONIUM BROMIDE 10 MG: 1 INJECTION, POWDER, LYOPHILIZED, FOR SOLUTION INTRAVENOUS at 09:52

## 2022-01-01 RX ADMIN — LEVETIRACETAM 1000 MG: 10 INJECTION INTRAVENOUS at 09:12

## 2022-01-01 RX ADMIN — MORPHINE SULFATE 4 MG: 4 INJECTION INTRAVENOUS at 01:07

## 2022-01-01 RX ADMIN — IPRATROPIUM BROMIDE AND ALBUTEROL SULFATE 1 AMPULE: .5; 3 SOLUTION RESPIRATORY (INHALATION) at 11:57

## 2022-01-01 RX ADMIN — Medication 2 CAPSULE: at 18:12

## 2022-01-01 RX ADMIN — SULFAMETHOXAZOLE AND TRIMETHOPRIM 380 MG: 80; 16 INJECTION, SOLUTION, CONCENTRATE INTRAVENOUS at 14:53

## 2022-01-01 RX ADMIN — METOPROLOL TARTRATE 25 MG: 25 TABLET, FILM COATED ORAL at 08:47

## 2022-01-01 RX ADMIN — IPRATROPIUM BROMIDE AND ALBUTEROL SULFATE 1 AMPULE: 2.5; .5 SOLUTION RESPIRATORY (INHALATION) at 11:30

## 2022-01-01 RX ADMIN — SODIUM CHLORIDE, PRESERVATIVE FREE 10 ML: 5 INJECTION INTRAVENOUS at 09:48

## 2022-01-01 RX ADMIN — FUROSEMIDE 20 MG: 10 INJECTION, SOLUTION INTRAMUSCULAR; INTRAVENOUS at 08:46

## 2022-01-01 RX ADMIN — PIPERACILLIN AND TAZOBACTAM 3375 MG: 3; .375 INJECTION, POWDER, FOR SOLUTION INTRAVENOUS at 02:30

## 2022-01-01 RX ADMIN — METHYLPREDNISOLONE SODIUM SUCCINATE 40 MG: 40 INJECTION, POWDER, FOR SOLUTION INTRAMUSCULAR; INTRAVENOUS at 21:35

## 2022-01-01 RX ADMIN — PHENYLEPHRINE HYDROCHLORIDE 30 MCG/MIN: 10 INJECTION INTRAVENOUS at 00:53

## 2022-01-01 RX ADMIN — POLYETHYLENE GLYCOL 3350 17 G: 17 POWDER, FOR SOLUTION ORAL at 10:22

## 2022-01-01 RX ADMIN — IPRATROPIUM BROMIDE AND ALBUTEROL SULFATE 1 AMPULE: .5; 3 SOLUTION RESPIRATORY (INHALATION) at 12:05

## 2022-01-01 RX ADMIN — HEPARIN SODIUM 5000 UNITS: 5000 INJECTION INTRAVENOUS; SUBCUTANEOUS at 06:05

## 2022-01-01 RX ADMIN — HEPARIN SODIUM 5000 UNITS: 5000 INJECTION INTRAVENOUS; SUBCUTANEOUS at 06:39

## 2022-01-01 RX ADMIN — FAMOTIDINE 20 MG: 10 INJECTION, SOLUTION INTRAVENOUS at 09:47

## 2022-01-01 RX ADMIN — Medication 2 CAPSULE: at 17:43

## 2022-01-01 RX ADMIN — ENOXAPARIN SODIUM 40 MG: 100 INJECTION SUBCUTANEOUS at 08:46

## 2022-01-01 RX ADMIN — SULFAMETHOXAZOLE AND TRIMETHOPRIM 339.2 MG: 80; 16 INJECTION, SOLUTION, CONCENTRATE INTRAVENOUS at 20:26

## 2022-01-01 RX ADMIN — PIPERACILLIN AND TAZOBACTAM 3375 MG: 3; .375 INJECTION, POWDER, FOR SOLUTION INTRAVENOUS at 17:40

## 2022-01-01 RX ADMIN — MORPHINE SULFATE 4 MG: 4 INJECTION INTRAVENOUS at 03:51

## 2022-01-01 RX ADMIN — IPRATROPIUM BROMIDE AND ALBUTEROL SULFATE 1 AMPULE: .5; 3 SOLUTION RESPIRATORY (INHALATION) at 15:45

## 2022-01-01 RX ADMIN — IPRATROPIUM BROMIDE AND ALBUTEROL SULFATE 1 AMPULE: .5; 3 SOLUTION RESPIRATORY (INHALATION) at 19:26

## 2022-01-01 RX ADMIN — MUPIROCIN: 20 OINTMENT TOPICAL at 09:41

## 2022-01-01 RX ADMIN — NALOXONE HYDROCHLORIDE 0.4 MG: 0.4 INJECTION, SOLUTION INTRAMUSCULAR; INTRAVENOUS; SUBCUTANEOUS at 21:36

## 2022-01-01 RX ADMIN — BUDESONIDE 1000 MCG: 0.5 SUSPENSION RESPIRATORY (INHALATION) at 08:19

## 2022-01-01 RX ADMIN — PREGABALIN 50 MG: 50 CAPSULE ORAL at 08:47

## 2022-01-01 RX ADMIN — PIPERACILLIN AND TAZOBACTAM 3375 MG: 3; .375 INJECTION, POWDER, FOR SOLUTION INTRAVENOUS at 12:31

## 2022-01-01 RX ADMIN — MUPIROCIN: 20 OINTMENT TOPICAL at 09:10

## 2022-01-01 RX ADMIN — Medication 20 MG: at 21:36

## 2022-01-01 RX ADMIN — ENOXAPARIN SODIUM 40 MG: 100 INJECTION SUBCUTANEOUS at 09:45

## 2022-01-01 RX ADMIN — SODIUM CHLORIDE 1000 MG: 9 INJECTION, SOLUTION INTRAVENOUS at 22:16

## 2022-01-01 RX ADMIN — SODIUM CHLORIDE, PRESERVATIVE FREE 10 ML: 5 INJECTION INTRAVENOUS at 20:17

## 2022-01-01 RX ADMIN — PIPERACILLIN AND TAZOBACTAM 3375 MG: 3; .375 INJECTION, POWDER, FOR SOLUTION INTRAVENOUS at 10:43

## 2022-01-01 RX ADMIN — HEPARIN SODIUM 5000 UNITS: 5000 INJECTION INTRAVENOUS; SUBCUTANEOUS at 15:15

## 2022-01-01 RX ADMIN — PIPERACILLIN AND TAZOBACTAM 4500 MG: 4; .5 INJECTION, POWDER, FOR SOLUTION INTRAVENOUS at 18:09

## 2022-01-01 RX ADMIN — APIXABAN 5 MG: 5 TABLET, FILM COATED ORAL at 09:09

## 2022-01-01 RX ADMIN — IPRATROPIUM BROMIDE AND ALBUTEROL SULFATE 1 AMPULE: .5; 3 SOLUTION RESPIRATORY (INHALATION) at 11:56

## 2022-01-01 RX ADMIN — CHLORHEXIDINE GLUCONATE 15 ML: 1.2 RINSE ORAL at 20:31

## 2022-01-01 RX ADMIN — PREGABALIN 50 MG: 50 CAPSULE ORAL at 14:17

## 2022-01-01 RX ADMIN — IPRATROPIUM BROMIDE AND ALBUTEROL SULFATE 1 AMPULE: .5; 3 SOLUTION RESPIRATORY (INHALATION) at 08:22

## 2022-01-01 RX ADMIN — HYDROCODONE BITARTRATE AND ACETAMINOPHEN 10 ML: 176/5 SOLUTION ORAL at 22:38

## 2022-01-01 RX ADMIN — PROPOFOL 30 MCG/KG/MIN: 10 INJECTION, EMULSION INTRAVENOUS at 09:47

## 2022-01-01 RX ADMIN — METOPROLOL TARTRATE 25 MG: 25 TABLET, FILM COATED ORAL at 09:09

## 2022-01-01 RX ADMIN — POLYETHYLENE GLYCOL 3350 17 G: 17 POWDER, FOR SOLUTION ORAL at 09:09

## 2022-01-01 RX ADMIN — MORPHINE SULFATE 4 MG: 4 INJECTION INTRAVENOUS at 14:34

## 2022-01-01 RX ADMIN — CHLORHEXIDINE GLUCONATE 15 ML: 1.2 RINSE ORAL at 20:16

## 2022-01-01 RX ADMIN — FUROSEMIDE 20 MG: 10 INJECTION, SOLUTION INTRAMUSCULAR; INTRAVENOUS at 09:47

## 2022-01-01 RX ADMIN — POLYETHYLENE GLYCOL 3350 17 G: 17 POWDER, FOR SOLUTION ORAL at 09:11

## 2022-01-01 RX ADMIN — IPRATROPIUM BROMIDE AND ALBUTEROL SULFATE 1 AMPULE: 2.5; .5 SOLUTION RESPIRATORY (INHALATION) at 19:40

## 2022-01-01 RX ADMIN — IPRATROPIUM BROMIDE AND ALBUTEROL SULFATE 1 AMPULE: 2.5; .5 SOLUTION RESPIRATORY (INHALATION) at 17:00

## 2022-01-01 RX ADMIN — MAGNESIUM SULFATE HEPTAHYDRATE 2000 MG: 40 INJECTION, SOLUTION INTRAVENOUS at 06:25

## 2022-01-01 RX ADMIN — SODIUM PHOSPHATE, MONOBASIC, MONOHYDRATE 25.17 MMOL: 276; 142 INJECTION, SOLUTION INTRAVENOUS at 12:24

## 2022-01-01 RX ADMIN — SODIUM CHLORIDE, PRESERVATIVE FREE 10 ML: 5 INJECTION INTRAVENOUS at 22:22

## 2022-01-01 RX ADMIN — SODIUM BICARBONATE: 84 INJECTION, SOLUTION INTRAVENOUS at 21:59

## 2022-01-01 RX ADMIN — METOPROLOL TARTRATE 25 MG: 25 TABLET, FILM COATED ORAL at 09:10

## 2022-01-01 RX ADMIN — HYDROCODONE BITARTRATE AND ACETAMINOPHEN 10 ML: 176/5 SOLUTION ORAL at 21:37

## 2022-01-01 RX ADMIN — SULFAMETHOXAZOLE AND TRIMETHOPRIM 380 MG: 80; 16 INJECTION, SOLUTION, CONCENTRATE INTRAVENOUS at 06:48

## 2022-01-01 RX ADMIN — DOCUSATE SODIUM 100 MG: 50 LIQUID ORAL at 08:45

## 2022-01-01 RX ADMIN — Medication 20 MG: at 20:12

## 2022-01-01 RX ADMIN — PREGABALIN 50 MG: 25 CAPSULE ORAL at 09:54

## 2022-01-01 RX ADMIN — DOCUSATE SODIUM 100 MG: 50 LIQUID ORAL at 10:22

## 2022-01-01 RX ADMIN — IPRATROPIUM BROMIDE AND ALBUTEROL SULFATE 1 AMPULE: .5; 3 SOLUTION RESPIRATORY (INHALATION) at 04:09

## 2022-01-01 RX ADMIN — PREGABALIN 50 MG: 50 CAPSULE ORAL at 09:10

## 2022-01-01 RX ADMIN — SULFAMETHOXAZOLE AND TRIMETHOPRIM 380 MG: 80; 16 INJECTION, SOLUTION, CONCENTRATE INTRAVENOUS at 05:16

## 2022-01-01 RX ADMIN — IPRATROPIUM BROMIDE AND ALBUTEROL SULFATE 1 AMPULE: 2.5; .5 SOLUTION RESPIRATORY (INHALATION) at 15:40

## 2022-01-01 RX ADMIN — TAMSULOSIN HYDROCHLORIDE 0.4 MG: 0.4 CAPSULE ORAL at 22:39

## 2022-01-01 RX ADMIN — HYDRALAZINE HYDROCHLORIDE 5 MG: 20 INJECTION INTRAMUSCULAR; INTRAVENOUS at 00:28

## 2022-01-01 RX ADMIN — SODIUM CHLORIDE, PRESERVATIVE FREE 10 ML: 5 INJECTION INTRAVENOUS at 20:40

## 2022-01-01 RX ADMIN — IPRATROPIUM BROMIDE AND ALBUTEROL SULFATE 1 AMPULE: .5; 3 SOLUTION RESPIRATORY (INHALATION) at 04:30

## 2022-01-01 RX ADMIN — ACETAMINOPHEN 650 MG: 325 TABLET ORAL at 19:27

## 2022-01-01 RX ADMIN — POLYETHYLENE GLYCOL 3350 17 G: 17 POWDER, FOR SOLUTION ORAL at 09:23

## 2022-01-01 RX ADMIN — VECURONIUM BROMIDE 10 MG: 1 INJECTION, POWDER, LYOPHILIZED, FOR SOLUTION INTRAVENOUS at 16:40

## 2022-01-01 RX ADMIN — FENTANYL CITRATE 25 MCG: 50 INJECTION, SOLUTION INTRAMUSCULAR; INTRAVENOUS at 02:23

## 2022-01-01 RX ADMIN — CHLORHEXIDINE GLUCONATE 15 ML: 1.2 RINSE ORAL at 08:31

## 2022-01-01 RX ADMIN — FUROSEMIDE 20 MG: 10 INJECTION, SOLUTION INTRAMUSCULAR; INTRAVENOUS at 12:13

## 2022-01-01 RX ADMIN — PROPOFOL 10.09 MCG/KG/MIN: 10 INJECTION, EMULSION INTRAVENOUS at 15:31

## 2022-01-01 RX ADMIN — CHLORHEXIDINE GLUCONATE 15 ML: 1.2 RINSE ORAL at 09:56

## 2022-01-01 RX ADMIN — IPRATROPIUM BROMIDE AND ALBUTEROL SULFATE 1 AMPULE: .5; 3 SOLUTION RESPIRATORY (INHALATION) at 07:43

## 2022-01-01 RX ADMIN — IPRATROPIUM BROMIDE AND ALBUTEROL SULFATE 1 AMPULE: 2.5; .5 SOLUTION RESPIRATORY (INHALATION) at 11:45

## 2022-01-01 RX ADMIN — IPRATROPIUM BROMIDE AND ALBUTEROL SULFATE 1 AMPULE: .5; 3 SOLUTION RESPIRATORY (INHALATION) at 23:14

## 2022-01-01 RX ADMIN — FENTANYL CITRATE 50 MCG: 50 INJECTION, SOLUTION INTRAMUSCULAR; INTRAVENOUS at 17:15

## 2022-01-01 RX ADMIN — SODIUM CHLORIDE 1000 MG: 9 INJECTION, SOLUTION INTRAVENOUS at 11:01

## 2022-01-01 RX ADMIN — IPRATROPIUM BROMIDE AND ALBUTEROL SULFATE 1 AMPULE: .5; 3 SOLUTION RESPIRATORY (INHALATION) at 23:33

## 2022-01-01 RX ADMIN — METOPROLOL TARTRATE 25 MG: 25 TABLET, FILM COATED ORAL at 08:41

## 2022-01-01 RX ADMIN — LORAZEPAM 2 MG: 2 INJECTION INTRAMUSCULAR; INTRAVENOUS at 17:43

## 2022-01-01 RX ADMIN — CHLORHEXIDINE GLUCONATE 15 ML: 1.2 RINSE ORAL at 09:10

## 2022-01-01 RX ADMIN — METOPROLOL TARTRATE 25 MG: 25 TABLET, FILM COATED ORAL at 20:05

## 2022-01-01 RX ADMIN — PROPOFOL 10 MCG/KG/MIN: 10 INJECTION, EMULSION INTRAVENOUS at 23:10

## 2022-01-01 RX ADMIN — HEPARIN SODIUM 5000 UNITS: 5000 INJECTION INTRAVENOUS; SUBCUTANEOUS at 14:12

## 2022-01-01 RX ADMIN — ENOXAPARIN SODIUM 40 MG: 100 INJECTION SUBCUTANEOUS at 09:08

## 2022-01-01 RX ADMIN — BUDESONIDE 1000 MCG: 0.5 SUSPENSION RESPIRATORY (INHALATION) at 08:27

## 2022-01-01 RX ADMIN — MORPHINE SULFATE 4 MG: 4 INJECTION INTRAVENOUS at 17:45

## 2022-01-01 RX ADMIN — MORPHINE SULFATE 4 MG: 4 INJECTION INTRAVENOUS at 17:53

## 2022-01-01 RX ADMIN — BUDESONIDE 500 MCG: 0.5 SUSPENSION RESPIRATORY (INHALATION) at 08:26

## 2022-01-01 RX ADMIN — SODIUM CHLORIDE, PRESERVATIVE FREE 10 ML: 5 INJECTION INTRAVENOUS at 09:00

## 2022-01-01 RX ADMIN — BUDESONIDE 1000 MCG: 0.5 SUSPENSION RESPIRATORY (INHALATION) at 19:39

## 2022-01-01 RX ADMIN — DOCUSATE SODIUM 100 MG: 50 LIQUID ORAL at 09:47

## 2022-01-01 RX ADMIN — Medication 20 MG: at 20:17

## 2022-01-01 RX ADMIN — IPRATROPIUM BROMIDE AND ALBUTEROL SULFATE 1 AMPULE: 2.5; .5 SOLUTION RESPIRATORY (INHALATION) at 07:44

## 2022-01-01 RX ADMIN — SODIUM CHLORIDE 1000 MG: 9 INJECTION, SOLUTION INTRAVENOUS at 22:01

## 2022-01-01 RX ADMIN — METOPROLOL TARTRATE 25 MG: 25 TABLET, FILM COATED ORAL at 20:25

## 2022-01-01 RX ADMIN — SULFAMETHOXAZOLE AND TRIMETHOPRIM 380 MG: 80; 16 INJECTION, SOLUTION, CONCENTRATE INTRAVENOUS at 14:08

## 2022-01-01 RX ADMIN — WATER 10 ML: 1 INJECTION INTRAMUSCULAR; INTRAVENOUS; SUBCUTANEOUS at 09:52

## 2022-01-01 RX ADMIN — MUPIROCIN: 20 OINTMENT TOPICAL at 10:22

## 2022-01-01 RX ADMIN — METHYLPREDNISOLONE SODIUM SUCCINATE 60 MG: 125 INJECTION, POWDER, FOR SOLUTION INTRAMUSCULAR; INTRAVENOUS at 08:32

## 2022-01-01 RX ADMIN — HYDROCODONE BITARTRATE AND ACETAMINOPHEN 10 ML: 176/5 SOLUTION ORAL at 20:16

## 2022-01-01 RX ADMIN — IPRATROPIUM BROMIDE AND ALBUTEROL SULFATE 1 AMPULE: .5; 3 SOLUTION RESPIRATORY (INHALATION) at 04:10

## 2022-01-01 RX ADMIN — LABETALOL HYDROCHLORIDE 5 MG: 5 INJECTION, SOLUTION INTRAVENOUS at 04:49

## 2022-01-01 RX ADMIN — METHYLPREDNISOLONE SODIUM SUCCINATE 40 MG: 40 INJECTION, POWDER, FOR SOLUTION INTRAMUSCULAR; INTRAVENOUS at 20:23

## 2022-01-01 RX ADMIN — DOCUSATE SODIUM 100 MG: 50 LIQUID ORAL at 09:11

## 2022-01-01 RX ADMIN — CHLORHEXIDINE GLUCONATE 15 ML: 1.2 RINSE ORAL at 20:23

## 2022-01-01 RX ADMIN — CHLORHEXIDINE GLUCONATE 15 ML: 1.2 RINSE ORAL at 09:23

## 2022-01-01 RX ADMIN — PROPOFOL 10 MCG/KG/MIN: 10 INJECTION, EMULSION INTRAVENOUS at 23:29

## 2022-01-01 RX ADMIN — LINEZOLID 600 MG: 600 INJECTION, SOLUTION INTRAVENOUS at 09:53

## 2022-01-01 RX ADMIN — IPRATROPIUM BROMIDE AND ALBUTEROL SULFATE 1 AMPULE: .5; 3 SOLUTION RESPIRATORY (INHALATION) at 15:54

## 2022-01-01 RX ADMIN — SULFAMETHOXAZOLE AND TRIMETHOPRIM 339.2 MG: 80; 16 INJECTION, SOLUTION, CONCENTRATE INTRAVENOUS at 04:37

## 2022-01-01 RX ADMIN — BUDESONIDE INHALATION SUSPENSION 1000 MCG: 0.5 SUSPENSION RESPIRATORY (INHALATION) at 08:15

## 2022-01-01 RX ADMIN — FAMOTIDINE 20 MG: 10 INJECTION, SOLUTION INTRAVENOUS at 22:20

## 2022-01-01 RX ADMIN — IPRATROPIUM BROMIDE AND ALBUTEROL SULFATE 1 AMPULE: .5; 3 SOLUTION RESPIRATORY (INHALATION) at 23:22

## 2022-01-01 RX ADMIN — METHYLPREDNISOLONE SODIUM SUCCINATE 60 MG: 125 INJECTION, POWDER, FOR SOLUTION INTRAMUSCULAR; INTRAVENOUS at 15:15

## 2022-01-01 RX ADMIN — DOCUSATE SODIUM 100 MG: 50 LIQUID ORAL at 08:32

## 2022-01-01 RX ADMIN — SODIUM CHLORIDE, PRESERVATIVE FREE 10 ML: 5 INJECTION INTRAVENOUS at 09:10

## 2022-01-01 RX ADMIN — Medication 2 CAPSULE: at 15:52

## 2022-01-01 RX ADMIN — METHYLPREDNISOLONE SODIUM SUCCINATE 60 MG: 125 INJECTION, POWDER, FOR SOLUTION INTRAMUSCULAR; INTRAVENOUS at 20:13

## 2022-01-01 RX ADMIN — METOPROLOL TARTRATE 25 MG: 25 TABLET, FILM COATED ORAL at 09:48

## 2022-01-01 RX ADMIN — FAMOTIDINE 20 MG: 10 INJECTION, SOLUTION INTRAVENOUS at 09:10

## 2022-01-01 RX ADMIN — SULFAMETHOXAZOLE AND TRIMETHOPRIM 352 MG: 80; 16 INJECTION, SOLUTION, CONCENTRATE INTRAVENOUS at 13:25

## 2022-01-01 RX ADMIN — SODIUM CHLORIDE, PRESERVATIVE FREE 10 ML: 5 INJECTION INTRAVENOUS at 20:32

## 2022-01-01 RX ADMIN — LORAZEPAM 2 MG: 2 INJECTION INTRAMUSCULAR; INTRAVENOUS at 12:42

## 2022-01-01 RX ADMIN — SODIUM CHLORIDE, PRESERVATIVE FREE 10 ML: 5 INJECTION INTRAVENOUS at 19:22

## 2022-01-01 RX ADMIN — SODIUM PHOSPHATE, MONOBASIC, MONOHYDRATE 20 MMOL: 276; 142 INJECTION, SOLUTION INTRAVENOUS at 10:17

## 2022-01-01 RX ADMIN — MUPIROCIN: 20 OINTMENT TOPICAL at 20:23

## 2022-01-01 RX ADMIN — POLYETHYLENE GLYCOL 3350 17 G: 17 POWDER, FOR SOLUTION ORAL at 09:47

## 2022-01-01 RX ADMIN — IPRATROPIUM BROMIDE AND ALBUTEROL SULFATE 1 AMPULE: .5; 3 SOLUTION RESPIRATORY (INHALATION) at 11:40

## 2022-01-01 RX ADMIN — APIXABAN 5 MG: 5 TABLET, FILM COATED ORAL at 20:13

## 2022-01-01 RX ADMIN — MIDAZOLAM HYDROCHLORIDE 2 MG: 1 INJECTION, SOLUTION INTRAMUSCULAR; INTRAVENOUS at 06:22

## 2022-01-01 RX ADMIN — METOPROLOL TARTRATE 25 MG: 25 TABLET, FILM COATED ORAL at 22:39

## 2022-01-01 RX ADMIN — FENTANYL CITRATE 50 MCG: 50 INJECTION, SOLUTION INTRAMUSCULAR; INTRAVENOUS at 01:39

## 2022-01-01 RX ADMIN — METHYLPREDNISOLONE SODIUM SUCCINATE 60 MG: 125 INJECTION, POWDER, FOR SOLUTION INTRAMUSCULAR; INTRAVENOUS at 20:16

## 2022-01-01 RX ADMIN — SODIUM CHLORIDE, PRESERVATIVE FREE 10 ML: 5 INJECTION INTRAVENOUS at 09:49

## 2022-01-01 RX ADMIN — Medication 2 CAPSULE: at 17:41

## 2022-01-01 RX ADMIN — Medication 2 CAPSULE: at 08:46

## 2022-01-01 RX ADMIN — SULFAMETHOXAZOLE AND TRIMETHOPRIM 352 MG: 80; 16 INJECTION, SOLUTION, CONCENTRATE INTRAVENOUS at 19:55

## 2022-01-01 RX ADMIN — SULFAMETHOXAZOLE AND TRIMETHOPRIM 352 MG: 80; 16 INJECTION, SOLUTION, CONCENTRATE INTRAVENOUS at 23:32

## 2022-01-01 RX ADMIN — SULFAMETHOXAZOLE AND TRIMETHOPRIM 339.2 MG: 80; 16 INJECTION, SOLUTION, CONCENTRATE INTRAVENOUS at 12:07

## 2022-01-01 RX ADMIN — ROCURONIUM BROMIDE 50 MG: 10 SOLUTION INTRAVENOUS at 17:00

## 2022-01-01 RX ADMIN — MUPIROCIN: 20 OINTMENT TOPICAL at 20:16

## 2022-01-01 RX ADMIN — Medication 2 CAPSULE: at 09:10

## 2022-01-01 RX ADMIN — IPRATROPIUM BROMIDE AND ALBUTEROL SULFATE 1 AMPULE: .5; 3 SOLUTION RESPIRATORY (INHALATION) at 15:32

## 2022-01-01 RX ADMIN — SULFAMETHOXAZOLE AND TRIMETHOPRIM 339.2 MG: 80; 16 INJECTION, SOLUTION, CONCENTRATE INTRAVENOUS at 20:17

## 2022-01-01 RX ADMIN — IPRATROPIUM BROMIDE AND ALBUTEROL SULFATE 1 AMPULE: 2.5; .5 SOLUTION RESPIRATORY (INHALATION) at 20:05

## 2022-01-01 RX ADMIN — TAMSULOSIN HYDROCHLORIDE 0.4 MG: 0.4 CAPSULE ORAL at 20:05

## 2022-01-01 RX ADMIN — CHLORHEXIDINE GLUCONATE 15 ML: 1.2 RINSE ORAL at 19:40

## 2022-01-01 RX ADMIN — DOCUSATE SODIUM 100 MG: 50 LIQUID ORAL at 08:41

## 2022-01-01 RX ADMIN — SODIUM CHLORIDE 1000 ML: 9 INJECTION, SOLUTION INTRAVENOUS at 21:23

## 2022-01-01 RX ADMIN — PREGABALIN 50 MG: 50 CAPSULE ORAL at 22:39

## 2022-01-01 RX ADMIN — CALCIUM GLUCONATE 1000 MG: 20 INJECTION, SOLUTION INTRAVENOUS at 12:26

## 2022-01-01 RX ADMIN — PREGABALIN 50 MG: 50 CAPSULE ORAL at 20:05

## 2022-01-01 RX ADMIN — IPRATROPIUM BROMIDE AND ALBUTEROL SULFATE 1 AMPULE: .5; 3 SOLUTION RESPIRATORY (INHALATION) at 07:46

## 2022-01-01 RX ADMIN — Medication 20 MG: at 08:52

## 2022-01-01 RX ADMIN — LORAZEPAM 4 MG: 2 INJECTION INTRAMUSCULAR; INTRAVENOUS at 09:05

## 2022-01-01 RX ADMIN — MAGNESIUM SULFATE HEPTAHYDRATE 2000 MG: 40 INJECTION, SOLUTION INTRAVENOUS at 10:10

## 2022-01-01 RX ADMIN — PIPERACILLIN AND TAZOBACTAM 3375 MG: 3; .375 INJECTION, POWDER, FOR SOLUTION INTRAVENOUS at 02:15

## 2022-01-01 RX ADMIN — LEVETIRACETAM 1000 MG: 10 INJECTION INTRAVENOUS at 11:20

## 2022-01-01 RX ADMIN — METOPROLOL TARTRATE 25 MG: 25 TABLET, FILM COATED ORAL at 19:21

## 2022-01-01 RX ADMIN — FAMOTIDINE 20 MG: 10 INJECTION, SOLUTION INTRAVENOUS at 20:04

## 2022-01-01 RX ADMIN — FAMOTIDINE 20 MG: 10 INJECTION, SOLUTION INTRAVENOUS at 08:46

## 2022-01-01 RX ADMIN — FUROSEMIDE 20 MG: 10 INJECTION, SOLUTION INTRAMUSCULAR; INTRAVENOUS at 22:21

## 2022-01-01 RX ADMIN — SULFAMETHOXAZOLE AND TRIMETHOPRIM 380 MG: 80; 16 INJECTION, SOLUTION, CONCENTRATE INTRAVENOUS at 22:29

## 2022-01-01 RX ADMIN — PREGABALIN 50 MG: 50 CAPSULE ORAL at 19:21

## 2022-01-01 RX ADMIN — IPRATROPIUM BROMIDE AND ALBUTEROL SULFATE 1 AMPULE: .5; 3 SOLUTION RESPIRATORY (INHALATION) at 08:19

## 2022-01-01 RX ADMIN — Medication 2 CAPSULE: at 10:22

## 2022-01-01 RX ADMIN — LINEZOLID 600 MG: 600 INJECTION, SOLUTION INTRAVENOUS at 18:45

## 2022-01-01 RX ADMIN — MONTELUKAST 10 MG: 10 TABLET, FILM COATED ORAL at 22:39

## 2022-01-01 RX ADMIN — SODIUM BICARBONATE 50 MEQ: 84 INJECTION INTRAVENOUS at 03:42

## 2022-01-01 RX ADMIN — Medication 2 CAPSULE: at 17:18

## 2022-01-01 RX ADMIN — Medication 2 CAPSULE: at 18:02

## 2022-01-01 RX ADMIN — CHLORHEXIDINE GLUCONATE 15 ML: 1.2 RINSE ORAL at 19:46

## 2022-01-01 RX ADMIN — POLYETHYLENE GLYCOL 3350 17 G: 17 POWDER, FOR SOLUTION ORAL at 08:41

## 2022-01-01 RX ADMIN — MIDAZOLAM HYDROCHLORIDE 4 MG: 1 INJECTION, SOLUTION INTRAMUSCULAR; INTRAVENOUS at 22:13

## 2022-01-01 RX ADMIN — MUPIROCIN: 20 OINTMENT TOPICAL at 20:21

## 2022-01-01 RX ADMIN — BUDESONIDE INHALATION SUSPENSION 1000 MCG: 0.5 SUSPENSION RESPIRATORY (INHALATION) at 08:19

## 2022-01-01 RX ADMIN — PREGABALIN 50 MG: 25 CAPSULE ORAL at 21:00

## 2022-01-01 RX ADMIN — LINEZOLID 600 MG: 600 INJECTION, SOLUTION INTRAVENOUS at 10:07

## 2022-01-01 RX ADMIN — LINEZOLID 600 MG: 600 INJECTION, SOLUTION INTRAVENOUS at 20:35

## 2022-01-01 RX ADMIN — BUDESONIDE 1000 MCG: 0.5 SUSPENSION RESPIRATORY (INHALATION) at 19:26

## 2022-01-01 RX ADMIN — Medication 20 MG: at 09:09

## 2022-01-01 RX ADMIN — APIXABAN 5 MG: 5 TABLET, FILM COATED ORAL at 11:19

## 2022-01-01 RX ADMIN — PIPERACILLIN AND TAZOBACTAM 3375 MG: 3; .375 INJECTION, POWDER, FOR SOLUTION INTRAVENOUS at 02:32

## 2022-01-01 RX ADMIN — BUDESONIDE INHALATION SUSPENSION 1000 MCG: 0.5 SUSPENSION RESPIRATORY (INHALATION) at 19:40

## 2022-01-01 RX ADMIN — Medication 1914 ML: at 17:35

## 2022-01-01 RX ADMIN — HYDROCODONE BITARTRATE AND ACETAMINOPHEN 10 ML: 176/5 SOLUTION ORAL at 20:13

## 2022-01-01 RX ADMIN — HEPARIN SODIUM 5000 UNITS: 5000 INJECTION INTRAVENOUS; SUBCUTANEOUS at 05:17

## 2022-01-01 RX ADMIN — SENNOSIDES 8.6 MG: 8.6 TABLET, FILM COATED ORAL at 19:21

## 2022-01-01 RX ADMIN — MONTELUKAST 10 MG: 10 TABLET, FILM COATED ORAL at 20:22

## 2022-01-01 RX ADMIN — FUROSEMIDE 20 MG: 10 INJECTION, SOLUTION INTRAMUSCULAR; INTRAVENOUS at 09:18

## 2022-01-01 RX ADMIN — METHYLPREDNISOLONE SODIUM SUCCINATE 60 MG: 125 INJECTION, POWDER, FOR SOLUTION INTRAMUSCULAR; INTRAVENOUS at 02:31

## 2022-01-01 RX ADMIN — FAMOTIDINE 20 MG: 10 INJECTION, SOLUTION INTRAVENOUS at 19:21

## 2022-01-01 RX ADMIN — HYDROCODONE BITARTRATE AND ACETAMINOPHEN 10 ML: 176/5 SOLUTION ORAL at 20:23

## 2022-01-01 RX ADMIN — IPRATROPIUM BROMIDE AND ALBUTEROL SULFATE 1 AMPULE: .5; 3 SOLUTION RESPIRATORY (INHALATION) at 15:52

## 2022-01-01 RX ADMIN — PIPERACILLIN AND TAZOBACTAM 3375 MG: 3; .375 INJECTION, POWDER, FOR SOLUTION INTRAVENOUS at 18:19

## 2022-01-01 RX ADMIN — TAMSULOSIN HYDROCHLORIDE 0.4 MG: 0.4 CAPSULE ORAL at 19:21

## 2022-01-01 RX ADMIN — METHYLPREDNISOLONE SODIUM SUCCINATE 60 MG: 125 INJECTION, POWDER, FOR SOLUTION INTRAMUSCULAR; INTRAVENOUS at 09:22

## 2022-01-01 RX ADMIN — PREGABALIN 50 MG: 50 CAPSULE ORAL at 13:23

## 2022-01-01 RX ADMIN — METOPROLOL TARTRATE 25 MG: 25 TABLET, FILM COATED ORAL at 20:14

## 2022-01-01 RX ADMIN — BUDESONIDE 1000 MCG: 0.5 SUSPENSION RESPIRATORY (INHALATION) at 19:43

## 2022-01-01 RX ADMIN — LINEZOLID 600 MG: 600 INJECTION, SOLUTION INTRAVENOUS at 08:31

## 2022-01-01 RX ADMIN — MIDAZOLAM HYDROCHLORIDE 2 MG: 1 INJECTION, SOLUTION INTRAMUSCULAR; INTRAVENOUS at 03:01

## 2022-01-01 RX ADMIN — LORAZEPAM 2 MG: 2 INJECTION INTRAMUSCULAR; INTRAVENOUS at 13:23

## 2022-01-01 RX ADMIN — METOPROLOL TARTRATE 25 MG: 25 TABLET, FILM COATED ORAL at 21:00

## 2022-01-01 RX ADMIN — FUROSEMIDE 20 MG: 10 INJECTION, SOLUTION INTRAMUSCULAR; INTRAVENOUS at 18:02

## 2022-01-01 RX ADMIN — HEPARIN SODIUM 5000 UNITS: 5000 INJECTION INTRAVENOUS; SUBCUTANEOUS at 22:38

## 2022-01-01 RX ADMIN — IPRATROPIUM BROMIDE AND ALBUTEROL SULFATE 1 AMPULE: .5; 3 SOLUTION RESPIRATORY (INHALATION) at 03:50

## 2022-01-01 RX ADMIN — FUROSEMIDE 20 MG: 10 INJECTION, SOLUTION INTRAMUSCULAR; INTRAVENOUS at 18:32

## 2022-01-01 RX ADMIN — LINEZOLID 600 MG: 600 INJECTION, SOLUTION INTRAVENOUS at 20:12

## 2022-01-01 RX ADMIN — IPRATROPIUM BROMIDE AND ALBUTEROL SULFATE 1 AMPULE: .5; 3 SOLUTION RESPIRATORY (INHALATION) at 11:38

## 2022-01-01 RX ADMIN — POTASSIUM CHLORIDE 20 MEQ: 29.8 INJECTION, SOLUTION INTRAVENOUS at 10:28

## 2022-01-01 RX ADMIN — IPRATROPIUM BROMIDE AND ALBUTEROL SULFATE 1 AMPULE: .5; 3 SOLUTION RESPIRATORY (INHALATION) at 19:32

## 2022-01-01 RX ADMIN — SODIUM CHLORIDE, PRESERVATIVE FREE 10 ML: 5 INJECTION INTRAVENOUS at 09:41

## 2022-01-01 RX ADMIN — MORPHINE SULFATE 4 MG: 4 INJECTION INTRAVENOUS at 20:04

## 2022-01-01 RX ADMIN — SULFAMETHOXAZOLE AND TRIMETHOPRIM 380 MG: 80; 16 INJECTION, SOLUTION, CONCENTRATE INTRAVENOUS at 20:44

## 2022-01-01 RX ADMIN — MORPHINE SULFATE 4 MG: 4 INJECTION INTRAVENOUS at 09:05

## 2022-01-01 RX ADMIN — LORAZEPAM 2 MG: 2 INJECTION INTRAMUSCULAR; INTRAVENOUS at 22:49

## 2022-01-01 RX ADMIN — SENNOSIDES 8.6 MG: 8.6 TABLET, FILM COATED ORAL at 20:05

## 2022-01-01 RX ADMIN — Medication 2 CAPSULE: at 08:41

## 2022-01-01 RX ADMIN — BUDESONIDE 1000 MCG: 0.5 SUSPENSION RESPIRATORY (INHALATION) at 07:46

## 2022-01-01 RX ADMIN — DOCUSATE SODIUM 100 MG: 50 LIQUID ORAL at 09:09

## 2022-01-01 RX ADMIN — MORPHINE SULFATE 4 MG: 4 INJECTION INTRAVENOUS at 16:08

## 2022-01-01 RX ADMIN — ACETAMINOPHEN 650 MG: 650 SUPPOSITORY RECTAL at 03:47

## 2022-01-01 RX ADMIN — MUPIROCIN: 20 OINTMENT TOPICAL at 20:17

## 2022-01-01 RX ADMIN — FUROSEMIDE 20 MG: 10 INJECTION, SOLUTION INTRAMUSCULAR; INTRAVENOUS at 17:43

## 2022-01-01 RX ADMIN — MUPIROCIN: 20 OINTMENT TOPICAL at 09:06

## 2022-01-01 RX ADMIN — LORAZEPAM 2 MG: 2 INJECTION INTRAMUSCULAR; INTRAVENOUS at 08:52

## 2022-01-01 ASSESSMENT — PULMONARY FUNCTION TESTS
PIF_VALUE: 50
PIF_VALUE: 29
PIF_VALUE: 41
PIF_VALUE: 34
PIF_VALUE: 42
PIF_VALUE: 52
PIF_VALUE: 26
PIF_VALUE: 46
PIF_VALUE: 28
PIF_VALUE: 27
PIF_VALUE: 37
PIF_VALUE: 30
PIF_VALUE: 29
PIF_VALUE: 49
PIF_VALUE: 19
PIF_VALUE: 35
PIF_VALUE: 53
PIF_VALUE: 40
PIF_VALUE: 42
PIF_VALUE: 32
PIF_VALUE: 33
PIF_VALUE: 30
PIF_VALUE: 21
PIF_VALUE: 41
PIF_VALUE: 41
PIF_VALUE: 29
PIF_VALUE: 20
PIF_VALUE: 38
PIF_VALUE: 31
PIF_VALUE: 43
PIF_VALUE: 27
PIF_VALUE: 50
PIF_VALUE: 29
PIF_VALUE: 24
PIF_VALUE: 22
PIF_VALUE: 33
PIF_VALUE: 49
PIF_VALUE: 35
PIF_VALUE: 40
PIF_VALUE: 32
PIF_VALUE: 42
PIF_VALUE: 42
PIF_VALUE: 34
PIF_VALUE: 21
PIF_VALUE: 35
PIF_VALUE: 40
PIF_VALUE: 21
PIF_VALUE: 31
PIF_VALUE: 49
PIF_VALUE: 22
PIF_VALUE: 33
PIF_VALUE: 22
PIF_VALUE: 16
PIF_VALUE: 42
PIF_VALUE: 28
PIF_VALUE: 50
PIF_VALUE: 48
PIF_VALUE: 40
PIF_VALUE: 48
PIF_VALUE: 40
PIF_VALUE: 42
PIF_VALUE: 15
PIF_VALUE: 49
PIF_VALUE: 31
PIF_VALUE: 50
PIF_VALUE: 49
PIF_VALUE: 50
PIF_VALUE: 48
PIF_VALUE: 28
PIF_VALUE: 34
PIF_VALUE: 41
PIF_VALUE: 37
PIF_VALUE: 34
PIF_VALUE: 35
PIF_VALUE: 34
PIF_VALUE: 50
PIF_VALUE: 21
PIF_VALUE: 29
PIF_VALUE: 44
PIF_VALUE: 44
PIF_VALUE: 41
PIF_VALUE: 50
PIF_VALUE: 40
PIF_VALUE: 41
PIF_VALUE: 45
PIF_VALUE: 41
PIF_VALUE: 22
PIF_VALUE: 29
PIF_VALUE: 49
PIF_VALUE: 33
PIF_VALUE: 20
PIF_VALUE: 27
PIF_VALUE: 32
PIF_VALUE: 29
PIF_VALUE: 40
PIF_VALUE: 31
PIF_VALUE: 41
PIF_VALUE: 35
PIF_VALUE: 41
PIF_VALUE: 50
PIF_VALUE: 37
PIF_VALUE: 21
PIF_VALUE: 42
PIF_VALUE: 28
PIF_VALUE: 23
PIF_VALUE: 29
PIF_VALUE: 32
PIF_VALUE: 22
PIF_VALUE: 26
PIF_VALUE: 38
PIF_VALUE: 48
PIF_VALUE: 49
PIF_VALUE: 19
PIF_VALUE: 21
PIF_VALUE: 15
PIF_VALUE: 50
PIF_VALUE: 36
PIF_VALUE: 41
PIF_VALUE: 41
PIF_VALUE: 27
PIF_VALUE: 33
PIF_VALUE: 25
PIF_VALUE: 23
PIF_VALUE: 34
PIF_VALUE: 50
PIF_VALUE: 22
PIF_VALUE: 16
PIF_VALUE: 34
PIF_VALUE: 35
PIF_VALUE: 41
PIF_VALUE: 43
PIF_VALUE: 43
PIF_VALUE: 28
PIF_VALUE: 40
PIF_VALUE: 43
PIF_VALUE: 47
PIF_VALUE: 28
PIF_VALUE: 50
PIF_VALUE: 21
PIF_VALUE: 15
PIF_VALUE: 35
PIF_VALUE: 24
PIF_VALUE: 31
PIF_VALUE: 33
PIF_VALUE: 24
PIF_VALUE: 20
PIF_VALUE: 43
PIF_VALUE: 41
PIF_VALUE: 34
PIF_VALUE: 26
PIF_VALUE: 40
PIF_VALUE: 50
PIF_VALUE: 15
PIF_VALUE: 41
PIF_VALUE: 31
PIF_VALUE: 21
PIF_VALUE: 49
PIF_VALUE: 41
PIF_VALUE: 27
PIF_VALUE: 28
PIF_VALUE: 43
PIF_VALUE: 28
PIF_VALUE: 23
PIF_VALUE: 41
PIF_VALUE: 52
PIF_VALUE: 40
PIF_VALUE: 40
PIF_VALUE: 21
PIF_VALUE: 22
PIF_VALUE: 37
PIF_VALUE: 50
PIF_VALUE: 48
PIF_VALUE: 51
PIF_VALUE: 24
PIF_VALUE: 33
PIF_VALUE: 49
PIF_VALUE: 33
PIF_VALUE: 35
PIF_VALUE: 34
PIF_VALUE: 35
PIF_VALUE: 44
PIF_VALUE: 48
PIF_VALUE: 31
PIF_VALUE: 29
PIF_VALUE: 50
PIF_VALUE: 30
PIF_VALUE: 40
PIF_VALUE: 41
PIF_VALUE: 34
PIF_VALUE: 49
PIF_VALUE: 40
PIF_VALUE: 27
PIF_VALUE: 53
PIF_VALUE: 43
PIF_VALUE: 34
PIF_VALUE: 30
PIF_VALUE: 49
PIF_VALUE: 51
PIF_VALUE: 22
PIF_VALUE: 31
PIF_VALUE: 51
PIF_VALUE: 18
PIF_VALUE: 40
PIF_VALUE: 40
PIF_VALUE: 43
PIF_VALUE: 33
PIF_VALUE: 21
PIF_VALUE: 47
PIF_VALUE: 21
PIF_VALUE: 31
PIF_VALUE: 28
PIF_VALUE: 33
PIF_VALUE: 51
PIF_VALUE: 21
PIF_VALUE: 41
PIF_VALUE: 50
PIF_VALUE: 27
PIF_VALUE: 42
PIF_VALUE: 35
PIF_VALUE: 50
PIF_VALUE: 40
PIF_VALUE: 21
PIF_VALUE: 34
PIF_VALUE: 35
PIF_VALUE: 31
PIF_VALUE: 50
PIF_VALUE: 50
PIF_VALUE: 52
PIF_VALUE: 31
PIF_VALUE: 31
PIF_VALUE: 41
PIF_VALUE: 38
PIF_VALUE: 38
PIF_VALUE: 24
PIF_VALUE: 21
PIF_VALUE: 41
PIF_VALUE: 40
PIF_VALUE: 51
PIF_VALUE: 34
PIF_VALUE: 22
PIF_VALUE: 42
PIF_VALUE: 24
PIF_VALUE: 38
PIF_VALUE: 15
PIF_VALUE: 21
PIF_VALUE: 48
PIF_VALUE: 27
PIF_VALUE: 28
PIF_VALUE: 49
PIF_VALUE: 39
PIF_VALUE: 30
PIF_VALUE: 28
PIF_VALUE: 28
PIF_VALUE: 35
PIF_VALUE: 45
PIF_VALUE: 33
PIF_VALUE: 21
PIF_VALUE: 48
PIF_VALUE: 43
PIF_VALUE: 48
PIF_VALUE: 22
PIF_VALUE: 40
PIF_VALUE: 56
PIF_VALUE: 25
PIF_VALUE: 26
PIF_VALUE: 51
PIF_VALUE: 27
PIF_VALUE: 33
PIF_VALUE: 34
PIF_VALUE: 46
PIF_VALUE: 40
PIF_VALUE: 35
PIF_VALUE: 25
PIF_VALUE: 16
PIF_VALUE: 32
PIF_VALUE: 28
PIF_VALUE: 41
PIF_VALUE: 42
PIF_VALUE: 42
PIF_VALUE: 40
PIF_VALUE: 21
PIF_VALUE: 28
PIF_VALUE: 33
PIF_VALUE: 30
PIF_VALUE: 40
PIF_VALUE: 35
PIF_VALUE: 41
PIF_VALUE: 35
PIF_VALUE: 38
PIF_VALUE: 43
PIF_VALUE: 27
PIF_VALUE: 27
PIF_VALUE: 26
PIF_VALUE: 49
PIF_VALUE: 35
PIF_VALUE: 41
PIF_VALUE: 48
PIF_VALUE: 24
PIF_VALUE: 40
PIF_VALUE: 34
PIF_VALUE: 50
PIF_VALUE: 43
PIF_VALUE: 36
PIF_VALUE: 27
PIF_VALUE: 33
PIF_VALUE: 48
PIF_VALUE: 33
PIF_VALUE: 42
PIF_VALUE: 51
PIF_VALUE: 51
PIF_VALUE: 49
PIF_VALUE: 37
PIF_VALUE: 41
PIF_VALUE: 50
PIF_VALUE: 31
PIF_VALUE: 51
PIF_VALUE: 50
PIF_VALUE: 33
PIF_VALUE: 31
PIF_VALUE: 41
PIF_VALUE: 26
PIF_VALUE: 50
PIF_VALUE: 48
PIF_VALUE: 31
PIF_VALUE: 50
PIF_VALUE: 49
PIF_VALUE: 33
PIF_VALUE: 25
PIF_VALUE: 32
PIF_VALUE: 45
PIF_VALUE: 50
PIF_VALUE: 21
PIF_VALUE: 52
PIF_VALUE: 31
PIF_VALUE: 38
PIF_VALUE: 34
PIF_VALUE: 30
PIF_VALUE: 40
PIF_VALUE: 34
PIF_VALUE: 14
PIF_VALUE: 41
PIF_VALUE: 25
PIF_VALUE: 32
PIF_VALUE: 34
PIF_VALUE: 41

## 2022-01-01 ASSESSMENT — PAIN SCALES - GENERAL
PAINLEVEL_OUTOF10: 0
PAINLEVEL_OUTOF10: 7
PAINLEVEL_OUTOF10: 7
PAINLEVEL_OUTOF10: 0
PAINLEVEL_OUTOF10: 0
PAINLEVEL_OUTOF10: 2

## 2022-10-20 PROBLEM — I46.9 CARDIOPULMONARY ARREST (HCC): Status: ACTIVE | Noted: 2022-01-01

## 2022-10-20 NOTE — ED NOTES
First set of blood cultures obtained by Amaris Chowdhury RN at this time prior to antibiotic infusion.      Diandra Tyler RN  10/20/22 5424

## 2022-10-20 NOTE — PROGRESS NOTES
Medication Reconciliation    List of medications patient is currently taking is complete. [facility]     Allergies  Vancomycin     Notes regarding home medications:   1. Unknown if patient received any medications prior to ED arrival. Attempted to call NH several time and no answer.

## 2022-10-20 NOTE — PROCEDURES
Bronchoscopy Procedure Note    Date of Operation: 10/20/2022    Surgeon: Caitlin Long MD    Anesthesia: Sedation/paralytics from RSI    Operation: Flexible fiberoptic bronchoscopy, diagnostic / therapeutic    Estimated Blood Loss: Minimal    Complications: None    Indications and History:  The patient is a 36 y.o. male with massive aspiration, hypoxemic respiratory failure. Procedure formed emergently due to hypoxemia. Description of Procedure: The patient was  identified as 36 y.o.  and the procedure verified as Flexible Fiberoptic Bronchoscopy. the patient was positioned supine and the bronchoscope was passed through the ETT . The scope was then passed into the trachea. Careful inspection of the tracheal lumen was accomplished. The scope was sequentially passed into all airways.        Endobronchial findings:    Trachea: Normal mucosa  [place]: Normal mucosa  Right main bronchus: Normal mucosa  Right upper lobe bronchus including anterior, apical and posterior until the fourth generation bronchi: Normal mucosa  Right intermedius bronchus: Normal mucosa  Right middle lobe: Normal mucosa  Right lower lobe superior segment uptill 3rd generation bronchi: Normal mucosa  Left main bronchus: Normal mucosa  Left upper lobe including Lingula, anterior segment, and apico-posterior segment through the 4th generation bronchi: Normal mucosa  Left lower lobe basilar and superior segments uptill 4th generation bronchi: Normal mucosa    Specimens:    BAL of the right lower lobe with 100 mL in and ~40 mL highly sedimental fluid      The Patient remains in the ICU in critical condition        Caitlin Long MD

## 2022-10-20 NOTE — ED PROVIDER NOTES
[facility] 2 ICU  eMERGENCY dEPARTMENT eNCOUnter      Pt Name: Uyen Waterman  MRN: [id number]  [place] 1980  Date of evaluation: 10/20/2022  Provider: Vicky Gilliland MD    CHIEF COMPLAINT       Chief Complaint   Patient presents with    Cardiac Arrest     Pt to ED from [place] [address] post acute [facility] via [place] [address] EMS s/p cardiac arrest.  Per EMS report, pt was found down unresponsive in the nursing home with absent pulse. CPR started by nursing home staff 10 min prior to EMS arrival.  EMS then continues CPR for 20 min with ROSC being obtained. HISTORY OF PRESENT ILLNESS   (Location/Symptom, Timing/Onset,Context/Setting, Quality, Duration, Modifying Factors, Severity)  Note limiting factors. Uyen Waterman is a 36 y.o. male who presents to the emergency department via EMS in cardiac arrest.  History from EMS is that this patient was at a care facility. He has a history of severe COPD, with a history of noncompliance with his oxygen. He was found down at the care facility. EMS was called. EMS reports that they gave 2 A of bicarb, 4 rounds of epinephrine and they also did CPR through Stites device. They intubated the patient with a Garo airway. They did obtain return of spontaneous circulation. Patient is unable to provide history and currently there is no family to provide any further history. HPI    NursingNotes were reviewed. REVIEW OF SYSTEMS    (2-9 systems for level 4, 10 or more for level 5)     Review of Systems   Unable to perform ROS: Intubated     Except as noted above the remainder of the review of systems was reviewed and negative. PAST MEDICAL HISTORY   History reviewed. No pertinent past medical history. SURGICALHISTORY     History reviewed. No pertinent surgical history.       CURRENT MEDICATIONS       Current Discharge Medication List        CONTINUE these medications which have NOT CHANGED    Details   acetaminophen (TYLENOL) 500 MG tablet Take 1,000 mg by mouth every 6 hours as needed for Pain or Fever      LORazepam (ATIVAN) 0.5 MG tablet Take 0.5 mg by mouth every 8 hours as needed for Anxiety. azithromycin (ZITHROMAX) 250 MG tablet Take 250 mg by mouth three times a week Mon/Wed/Fri      buPROPion (WELLBUTRIN XL) 300 MG extended release tablet Take 300 mg by mouth every morning      apixaban (ELIQUIS) 5 MG TABS tablet Take 5 mg by mouth 2 times daily      hydrOXYzine HCl (ATARAX) 25 MG tablet Take 25 mg by mouth in the morning, at noon, and at bedtime      ipratropium-albuterol (DUONEB) 0.5-2.5 (3) MG/3ML SOLN nebulizer solution Inhale 1 vial into the lungs every 6 hours as needed for Shortness of Breath      pregabalin (LYRICA) 50 MG capsule Take 50 mg by mouth 3 times daily. metoprolol tartrate (LOPRESSOR) 25 MG tablet Take 25 mg by mouth 2 times daily      mometasone (ASMANEX) 200 MCG/ACT AERO inhaler Inhale 2 puffs into the lungs 2 times daily      montelukast (SINGULAIR) 10 MG tablet Take 10 mg by mouth nightly      nicotine (NICODERM CQ) 21 MG/24HR Place 1 patch onto the skin every 24 hours      predniSONE (DELTASONE) 20 MG tablet Take 20 mg by mouth See Admin Instructions 20 mg through 10/21/22, then decrease to 10 mg daily through 11/11/22      pantoprazole (PROTONIX) 40 MG tablet Take 40 mg by mouth daily      oxyCODONE (ROXICODONE) 5 MG immediate release tablet Take 5 mg by mouth every 6 hours as needed for Pain.      tamsulosin (FLOMAX) 0.4 MG capsule Take 0.4 mg by mouth at bedtime             ALLERGIES     Vancomycin    FAMILY HISTORY     History reviewed. No pertinent family history.        SOCIAL HISTORY       Social History     Socioeconomic History    Marital status: Single     Spouse name: None    Number of children: None    Years of education: None    Highest education level: None   Tobacco Use    Smoking status: Unknown   Vaping Use    Vaping Use: Unknown       SCREENINGS    [place] Coma Scale  Eye Opening: None  Best Verbal Response: None  Best Motor Response: None  Fran Coma Scale Score: 3        PHYSICAL EXAM    (up to 7 for level 4, 8 or more for level 5)     ED Triage Vitals   BP Temp Temp src Pulse Resp SpO2 Height Weight   -- -- -- -- -- -- -- --       Physical Exam  Vitals and nursing note reviewed. Constitutional:       Appearance: He is well-developed. HENT:      Head: Normocephalic and atraumatic. Nose: Nose normal.      Mouth/Throat:      Mouth: Mucous membranes are moist.      Comments: Patient has particles of food around his oral airway and inside his mouth. Those were suctioned. Eyes:      General:         Right eye: No discharge. Left eye: No discharge. Conjunctiva/sclera: Conjunctivae normal.   Neck:      Comments: Cervical collar was placed  Cardiovascular:      Rate and Rhythm: Tachycardia present. Pulmonary:      Effort: Pulmonary effort is normal. No respiratory distress. Comments: Breath sounds are heard although distant with bagging. Bedside ultrasound was performed and I see lung sliding bilaterally. Abdominal:      Comments: Patient's abdomen is distended and mildly firm. Musculoskeletal:         General: Normal range of motion. Skin:     General: Skin is warm and dry. Capillary Refill: Capillary refill takes less than 2 seconds. Neurological:      Comments: GCS 3 intubated   Psychiatric:         Behavior: Behavior normal.       DIAGNOSTIC RESULTS     EKG: All EKG's are interpreted by the Emergency Department Physician who either signs or Co-signsthis chart in the absence of a cardiologist.    The Ekg interpreted by me shows  sinus tachycardia, [id number]    Axis is   Normal  QTc is   453 msec  Intervals and Durations are unremarkable.       ST Segments: nonspecific changes  No         RADIOLOGY:   Non-plain filmimages such as CT, Ultrasound and MRI are read by the radiologist. Plain radiographic images are visualized and preliminarily interpreted by the emergency physician with the below findings:        Interpretation per the Radiologist below, if available at the time ofthis note:    [address]   Final Result   Examination limited by patient's motion. No acute intracranial abnormalities   noted. Sinusitis of the left maxillary sinus, right and left sphenoid   sinuses, and bilateral ethmoid air cells. Suggestion of nondisplaced   bilateral nasal bones fracture         CT CERVICAL SPINE WO CONTRAST   Final Result   1. No acute gross fracture. CTA CHEST ABDOMEN PELVIS W CONTRAST   Final Result   1. CTA CHEST: Right pleural effusion with bibasilar consolidation as well as   patchy infiltrates bilateral lungs which may be related to acute infectious   inflammatory process or sequela of aspiration. 2.  Endotracheal tube tip is noted trachea, tip at the level of the aortic   knob. 3. Prominent supradiaphragmatic portion of the inferior vena cava as it   enters the right atrium, 5 cm diameter. 4. CTA ABDOMEN/PELVIS: No acute abnormality. 5.  Cholelithiasis and contracted gallbladder without other findings of acute   cholecystitis. 6. Hepatic steatosis. 7. NG tube courses within the esophagus, tip is at the proximal gastric body. 8. Bilateral adrenal nodular hyperplasia and bilateral adrenal adenomas   (benign finding requiring no additional evaluation or follow-up).          XR CHEST PORTABLE   Final Result   Right pleural effusion      Right basilar opacity could represent atelectasis or infection      Cardiomegaly         XR CHEST PORTABLE    (Results Pending)   XR ABDOMEN (KUB) (SINGLE AP VIEW)    (Results Pending)         ED BEDSIDE ULTRASOUND:   Performed by ED Physician - none    LABS:  Labs Reviewed   BLOOD GAS, VENOUS - Abnormal; Notable for the following components:       Result Value    pH, Ibrahima 7.041 (*)     pCO2, Ibrahima >120.0 (*)     All other components within normal limits    Narrative:     Randal Centeno tel. [phone],  Chemistry results called to and read back by Efren Alfaro RN, 10/20/2022 15:58,  by [facility]   LACTIC ACID - Abnormal; Notable for the following components:    Lactic Acid 2.4 (*)     All other components within normal limits    Narrative:     Collection has been rescheduled by Renard Mack at 10/20/2022 19:00 Reason:   Nurse to [address] [place] - Abnormal; Notable for the following components:    Glucose, Ur 100 (*)     All other components within normal limits   MICROSCOPIC URINALYSIS - Abnormal; Notable for the following components:    Bacteria, UA 1+ (*)     Hyaline Casts, UA 9 (*)     WBC, UA 9 (*)     All other components within normal limits   CBC WITH AUTO DIFFERENTIAL - Abnormal; Notable for the following components:    WBC 17.3 (*)     RBC 3.81 (*)     Hemoglobin 9.6 (*)     Hematocrit 32.3 (*)     MCH 25.1 (*)     MCHC 29.6 (*)     RDW 24.4 (*)     Platelets 577 (*)     Neutrophils Absolute 13.4 (*)     All other components within normal limits   COMPREHENSIVE METABOLIC PANEL W/ REFLEX TO MG FOR LOW K - Abnormal; Notable for the following components:    Chloride 91 (*)     CO2 45 (*)     Glucose 215 (*)     Creatinine 0.8 (*)     All other components within normal limits   BLOOD GAS, ARTERIAL - Abnormal; Notable for the following components:    pH, Arterial 6.955 (*)     pCO2, Arterial >120.0 (*)     pO2, Arterial 111.0 (*)     Hemoglobin, Art, Extended 9.4 (*)     Carboxyhgb, Arterial 1.9 (*)     All other components within normal limits    Narrative:     CALL  Gurpreet VARMA tel. [phone],  Chemistry results called to and read back by bonnie silverio rn, 10/20/2022 17:47,  by [facility]  Chemistry results called to and read back by Soledad Cano RN, 10/20/2022  16:47, by [facility]   POCT GLUCOSE - Abnormal; Notable for the following components:    POC Glucose 169 (*)     All other components within normal limits   CULTURE, BLOOD 1   CULTURE, BLOOD 2   CULTURE, RESPIRATORY   CULTURE, BLOOD 1   STREP PNEUMONIAE ANTIGEN LEGIONELLA ANTIGEN, URINE   LACTIC ACID   CELL COUNT WITH DIFFERENTIAL, BAL FLUID   CK   TROPONIN   HEPATIC FUNCTION PANEL   LACTATE DEHYDROGENASE   LACTATE, SEPSIS   LACTATE, SEPSIS   LACTIC ACID   LACTIC ACID   COMPREHENSIVE METABOLIC PANEL   MAGNESIUM   PHOSPHORUS   CBC WITH AUTO DIFFERENTIAL   BLOOD GAS, ARTERIAL   POCT GLUCOSE   POCT GLUCOSE   POCT GLUCOSE   POCT GLUCOSE       All other labs were within normal range or not returned as of this dictation. EMERGENCY DEPARTMENT COURSE and DIFFERENTIAL DIAGNOSIS/MDM:   Vitals:    Vitals:    10/20/22 1820 10/20/22 1830 10/20/22 1949 10/20/22 2002   BP: 133/82 130/81     Pulse: 98 100 (!) 104 (!) 115   Resp: 16 14 16 18   Temp:       TempSrc:       SpO2: 97% 95% (!) 89% (!) 89%   Weight:       Height:               MDM  Number of Diagnoses or Management Options  Aspiration into airway, initial encounter  Hypercarbia  Respiratory arrest ([facility] [address]  Diagnosis management comments: Patient arrives with return of spontaneous circulation. He has a pulse. When I noticed the vomitus to the airway immediate attention was turned towards protecting the patient's airway definitively. He was given etomidate 20 mg IV and succinylcholine 100 mg IV. Patient was intubated on the first pass. He had food and vomitus in his airway which was suctioned before and after intubation. While we are able to bag the patient is quite difficult. Emergent chest x-ray was performed along with bedside ultrasound. I do not see an obvious pneumothorax. There is a possibility of lung collapse on the right. Bedside ultrasound showed lung sliding. Given that I was able to intubate the patient with a glide scope I was confident that I was in the airway. Pulmonary critical care was emergently consulted and they came immediately to the bedside. They performed bronchoscopy and did notice some vomitus in the patient's airway but no obvious mucous plugging or air trapping appreciated.   Patient remained difficult to bag but we were able to ventilate. He will be taken emergently to the CAT scanner because I worry about several possibilities. #1 he could have a pneumothorax that were unaware of despite ultrasound and chest x-ray. 2 we may be able to see some plugging that we did not appreciate on the emergent bronchoscopy. 3 the patient's abdomen is distended. For the patient's mediastinum could be wide or could be lung making it appear wide. If it is wide mediastinum aortic dissection must be considered. No history was able to be obtained so without imaging we will not know if that is the case with this patient. I accompanied the patient to the CT scanner. Review of the limited records reveal the patient is allergic to vancomycin. No other allergies noted on the patient's paperwork that accompanies him from the care facility. There was some concern that this patient might be a DNR as called in by the advanced practice provider; however, I tried to call the family and was unsuccessful. The advanced practice provider called the nursing director of the care facility and the best we can tell now is that the patient is a full code and we will continue to proceed in that manner. Work-up of the patient is significant for hypercarbia, respiratory acidosis, leukocytosis, and a CT scan concerning for acute infectious inflammatory process or sequelae of aspiration. Patient has a vancomycin allergy so he will be placed on antibiotics that will cover for broad-spectrum in this critically ill patient. Pharmacy is helping me to select the medications and they adjust the dosing. Patient is on multiple psychiatric medications was relayed to pharmacy so that we could reduce the risk to the patient of antibiotics. I needed to speak to pulmonary because despite paralyzing the patient, he remains difficult to ventilate.   Respiratory therapy and I also spoke to pulmonary to adjust the vent settings several times in an effort to better ventilate the patient. This patient is critically ill. I was in his room initially for over 60 minutes directing his resuscitation, and accompanying the patient to the CAT scan and then another 20 to 25 minutes after he returned from CAT scan reassessing the patient, working to help improve ventilation of the patient and talking to consultants. This patient will be admitted to the hospitalist service      Is this patient to be included in the SEP-1 Core Measure? Yes   SEP-1 CORE MEASURE DATA      Sepsis Criteria   Severe Sepsis Criteria   Septic Shock Criteria     Must be confirmed or suspected to move forward with diagnosis of sepsis. Must meet 2:    [x] Temperature > 100.9 F (38.3 C)        or < 96.8 F (36 C)  [] HR > 90  [] RR > 20  [x] WBC > 12 or < 4 or 10% bands      AND:      [x] Infection Confirmed or        Suspected. Must meet 1:    [] Lactate > 2       or   [] Signs of Organ Dysfunction:    - SBP < 90 or MAP < 65  - Altered mental status  - Creatinine > 2 or increased from      baseline  - Urine Output < 0.5 ml/kg/hr  - Bilirubin > 2  - INR > 1.5 (not anticoagulated)  - Platelets < 140,964  - Acute Respiratory Failure as     evidenced by new need for NIPPV     or mechanical ventilation      [] No criteria met for Severe Sepsis. Must meet 1:    [] Lactate > 4        or   [x] SBP < 90 or MAP < 65 for at        least two readings in the first        hour after fluid bolus        administration      [] Vasopressors initiated (if hypotension persists after fluid resuscitation)        [] No criteria met for Septic Shock.    Patient Vitals for the past 6 hrs:   BP Temp Pulse Resp SpO2 Height Weight Weight Method Percent Weight Change   10/20/22 1515 (!) 155/89 -- (!) 113 17 99 % -- -- -- --   10/20/22 1525 (!) 169/89 -- (!) 118 15 97 % -- -- -- --   10/20/22 1535 (!) 168/90 -- (!) 119 14 91 % -- -- -- --   10/20/22 1550 (!) 157/79 -- (!) 142 15 94 % -- -- -- --   10/20/22 1605 134/87 -- (!) 133 14 94 % -- -- -- --   10/20/22 1615 118/61 -- (!) 128 19 93 % -- 190 lb 11.2 oz (86.5 kg) Actual;Bed scale 0   10/20/22 1623 -- -- (!) 126 19 96 % -- -- -- --   10/20/22 1630 (!) 119/55 -- (!) 124 16 95 % -- -- -- --   10/20/22 1635 (!) 112/58 -- (!) 123 17 96 % -- -- -- --   10/20/22 1640 (!) 116/54 -- (!) 122 14 96 % -- -- -- --   10/20/22 1645 (!) 100/47 -- (!) 118 20 94 % -- -- -- --   10/20/22 1650 (!) 67/39 -- (!) 110 11 -- -- -- -- --   10/20/22 1653 (!) 70/47 -- (!) 116 18 (!) 88 % -- -- -- --   10/20/22 1655 (!) 82/46 -- (!) 115 11 93 % -- -- -- --   10/20/22 1659 (!) 87/50 -- (!) 115 15 92 % -- -- -- --   10/20/22 1700 -- -- -- -- -- 5' 6\" (1.676 m) -- -- --   10/20/22 1710 (!) 97/53 (!) 95.2 °F (35.1 °C) (!) 113 22 100 % -- -- -- --   10/20/22 1722 -- -- (!) 112 18 100 % -- -- -- --   10/20/22 1724 (!) 92/55 -- (!) 112 18 100 % -- -- -- --   10/20/22 1740 104/64 -- (!) 107 18 100 % -- -- -- --   10/20/22 1750 111/66 -- (!) 103 18 99 % -- -- -- --   10/20/22 1800 117/72 -- 99 18 98 % -- -- -- --   10/20/22 1810 132/84 -- 98 18 97 % -- -- -- --   10/20/22 1820 133/82 -- 98 16 97 % -- -- -- --   10/20/22 1830 130/81 -- 100 14 95 % -- -- -- --   10/20/22 1949 -- -- (!) 104 16 (!) 89 % -- -- -- --   10/20/22 2002 -- -- (!) 115 18 (!) 89 % -- -- -- --      Recent Labs     10/20/22  1626 10/20/22  1634 10/20/22  1945   WBC 17.3*  --   --    LACTA  --  1.0 2.4*   CREATININE 0.8*  --   --    BILITOT <0.2  --   --    *  --   --          Time Septic Shock Identified: 1710    Fluid Resuscitation Rational: at least 30mL/kg based on ideal body weight due to obesity defined as BMI >30 (patient's BMI is Body mass index is 30.78 kg/m².  and IBW is Ideal body weight: 63.8 kg (140 lb 10.5 oz)Adjusted ideal body weight: 72.9 kg (160 lb 10.7 oz))      Repeat lactate level: ordered and pending at this time    Reassessment Exam:   1725 - , PO2 100%, normal cap refill, distal pulses intact, SBP 160s      CRITICAL CARE TIME   I personally saw the patient and independently provided 75 minutes of non-concurrent critical care out of the total shared critical care time provided. There was a high probability of clinically significant/life threatening deterioration in the patient's condition which required my urgent intervention. CONSULTS:  IP CONSULT TO PULMONOLOGY  IP CONSULT TO DIETITIAN    PROCEDURES:  Unless otherwise noted below, none     Central Line    Date/Time: 10/20/2022 5:57 PM  Performed by: Radha Carreon MD  Authorized by: Radha Carreon MD     Consent:     Consent obtained:  Emergent situation  Pre-procedure details:     Indication(s): central venous access      Hand hygiene: Hand hygiene performed prior to insertion      Sterile barrier technique: All elements of maximal sterile technique followed      Skin preparation:  Chlorhexidine  Procedure details:     Location:  R femoral    Catheter size:  7 Fr    Landmarks identified: yes      Ultrasound guidance: yes      Ultrasound guidance timing: real time      Sterile ultrasound techniques: Sterile gel and sterile probe covers were used      Number of attempts:  1    Successful placement: yes    Post-procedure details:     Post-procedure:  Dressing applied    Assessment:  Blood return through all ports    Procedure completion:  Tolerated well, no immediate complications  Intubation    Date/Time: 10/20/2022 8:48 PM  Performed by: Radha Carreon MD  Authorized by: Riley Hernández MD     Consent:     Consent obtained:  Emergent situation  Pre-procedure details:     Patient status:  Unresponsive    Induction agents:  Etomidate    Paralytics:  Succinylcholine  Procedure details:     CPR in progress: no      Intubation method:  Oral    Intubation technique: video assisted      Laryngoscope blade:   Mac 4    Tube size (mm):  7.5    Tube type:  Cuffed    Number of attempts:  1    Ventilation between attempts: no Tube visualized through cords: yes    Placement assessment:     Tube secured with:  ETT suarez    Breath sounds:  Equal    Placement verification: CXR verification and direct visualization      CXR findings:  Appropriate position  Post-procedure details:     Procedure completion:  Tolerated well, no immediate complications  Comments:      Patient had evidence of prior aspiration before intubation started and lots of fluid and food debris in his posterior oropharynx during intubation. He was suctioned out prior to and immediately after intubation. FINAL IMPRESSION      1. Respiratory arrest ([address]    2. Aspiration into airway, initial encounter    3. Hypercarbia          DISPOSITION/PLAN   DISPOSITION Admitted 10/20/2022 06:05:15 PM      PATIENT REFERRED TO:  No follow-up provider specified.     DISCHARGE MEDICATIONS:  Current Discharge Medication List             (Please note that portions of this note were completed with a voice recognition program.Efforts were made to edit the dictations but occasionally words are mis-transcribed.)    Donta Ruiz MD (electronically signed)  Attending Emergency Physician          Donta Ruiz MD  10/20/22 1297       Donta Ruiz MD  10/20/22 6134

## 2022-10-20 NOTE — CONSULTS
REASON FOR CONSULTATION/CC: Hypoxemic respiratory failure, cardiac arrest      Consult at request of Kalia Lemon*     PCP: No primary care provider on file. Established Pulmonologist:  None    Chief Complaint   Patient presents with    Cardiac Arrest     Pt to ED from [place] [facility] via [place] [facility] s/p cardiac arrest.  Per EMS report, pt was found down unresponsive in the nursing home with absent pulse. CPR started by nursing home staff 10 min prior to EMS arrival.  EMS then continues CPR for 20 min with ROSC being obtained. HISTORY OF PRESENT ILLNESS: Tomas Agosto is a 36y.o. year old male with a history of COPD who presents with cardiac arrest from nursing home. Witnessed cardiac arrest and reported vomitus with presumed aspiration. Presumed downtime approximately 30 minutes ROSC achieved. Following intubation found to have difficulty with ventilation and persistent hypoxemia. Chest x-ray with right tracheal deviation and wide mediastinum. Chest CT pending. Emergent bedside bronchoscopy for therapeutic aspiration of foodstuffs performed with no evidence of airway occlusion from food, there was some vomitus seen on lavage. Unable to provide HPI is obtained from bedside nurse report EMR and physician report. No past medical history on file. No past surgical history on file. Family Hx  family history is not on file. Unable to obtain due to mechanical ventilation  Social Hx   has no history on file for tobacco use. Scheduled Meds:      Continuous Infusions:      PRN Meds:      ALLERGIES:  Patient has no allergies on file. REVIEW OF SYSTEMS:  Unable to obtain due to mechanical ventilation    Objective:   PHYSICAL EXAM:  There were no vitals taken for this visit.'  Gen:  No acute distress. Eyes: PERRL. Anicteric sclera. No conjunctival injection. ENT: No discharge. OP with ETT external appearance of ears and nose normal.  Neck: Trachea midline.  No mass Resp:  No crackles. No wheezes. No rhonchi. No dullness on percussion. CV: Regular rate. Regular rhythm. No murmur or rub. No edema. GI: Soft, Non-tender. Non-distended. +BS  Skin: Warm, dry, w/o erythema. Lymph: No cervical or supraclavicular LAD. M/S: No cyanosis. No clubbing. Neuro: Intubated and sedated      Data Reviewed:   LABS:  CBC: No results for input(s): WBC, HGB, HCT, MCV, PLT in the last 72 hours. BMP: No results for input(s): NA, K, CL, CO2, PHOS, BUN, CREATININE, CA in the last 72 hours. LIVER PROFILE: No results for input(s): AST, ALT, LIPASE, BILIDIR, BILITOT, ALKPHOS in the last 72 hours. Invalid input(s): AMYLASE,  ALB  PT/INR: No results for input(s): PROTIME, INR in the last 72 hours. APTT: No results for input(s): APTT in the last 72 hours. UA:  Recent Labs     10/20/22  1528   COLORU Yellow   PHUR 5.5   WBCUA 9*   RBCUA 1   BACTERIA 1+*   CLARITYU Clear   SPECGRAV 1.024   LEUKOCYTESUR Negative   UROBILINOGEN 0.2   BILIRUBINUR Negative   BLOODU Negative   GLUCOSEU 100*     No results for input(s): PHART, DWH2CGN, PO2ART in the last 72 hours. Radiology Review:  Pertinent images / reports were reviewed as a part of this visit. Access  CVC   Arterial          PICC             CVC                  Mendez         Assessment/Plan:     Cardiac arrest  -ROSC achieved after approximately 30 minutes  -CT head pending    Acute hypoxic and hypercapnic respiratory failure with SPO2 less than 90% on room air  Aspiration pneumonia  -Cefepime/Flagyl/Vanco, check MRSA nares  -Certainly concern for aspiration event though would not explain rigid chest and rigid abdomen.  -Difficulty ventilating due to very poor chest wall compliance. As far as we know he did not receive fentanyl that would cause chest wall rigidity. Will be on propofol and Versed pushes.   May benefit from Nimbex.  -Check CK    Peridex  Pepcid  Heparin    Due to the immediate potential for life-threatening deterioration due to cardiac arrest, I spent 32 minutes providing critical care. This time is excluding time spent performing separately billable procedures. This note was transcribed using [id number] [employer]. Please disregard any translational errors.     Thank you for the consult    [address]  Pulmonary, Sleep and Critical Care Medicine

## 2022-10-20 NOTE — H&P
Hospital Medicine History and Physical    10/20/2022    Date of Admission: 10/20/2022    Date of Service: Pt seen/examined on 10/20/2022 and admitted to inpatient. Assessment/plan:  Cardiorespiratory arrest/not shockable rhythm. Patient had return of spontaneous circulation after about 30 minutes of CPR, eventually endotracheally intubated in the emergency room. Check echocardiogram.  Telemetry monitoring. Continue supportive measures while on ventilator. Acute on chronic respiratory failure with hypoxia and hypercapnia. Suspect secondary to aspiration pneumonia. Currently endotracheally intubated. Critical care on board and assisting with ventilator management. Aspiration/bacterial pneumonia, sepsis secondary to pneumonia. Continue IV antibiotics (Zosyn and Zyvox). Continue intravenous fluid and trend lactic acid level. On ventilator support as noted above. Critical care on board and assisting with management. Abdomen is distended on exam.  However, CT-abdomen/pelvis was unremarkable for acute pathology. Other comorbidities: history of severe COPD, chronic respiratory failure with hypoxia and hypercapnia, alcohol abuse, chronic pancreatitis, tobacco use disorder, GERD, BPH, Eliquis use (indication noted in epic states IVC aneurysm). Activities: Bedrest  Prophylaxis: Eliquis  Code status: Full code    ==========================================================  Chief complaint:  Chief Complaint   Patient presents with    Cardiac Arrest     Pt to ED from [facility] via [place] [facility] s/p cardiac arrest.  Per EMS report, pt was found down unresponsive in the nursing home with absent pulse. CPR started by nursing home staff 10 min prior to EMS arrival.  EMS then continues CPR for 20 min with ROSC being obtained. History of Presenting Illness:   This is a pleasant 36 y.o. male with history of severe COPD, chronic respiratory failure with hypoxia and hypercapnia, alcohol abuse, chronic pancreatitis, tobacco use disorder, GERD, BPH, Eliquis use (indication noted in epic states IVC aneurysm), who was brought to the emergency room for evaluation of unresponsiveness at nursing home, reportedly pulseless at the time of initial evaluation by EMS. It was reported the patient the patient vomited to, after which he went into cardiac arrest (no shockable rhythm). Resuscitation attempts lasted about 30 minutes before eventual return of spontaneous circulation; he received 4 doses of epinephrine, 2 A of bicarb. On arrival to the emergency room, patient was endotracheally intubated, and this was complicated by difficulty with oxygenation, as he had persistent hypoxemia. Chest x-ray was concerning for right tracheal deviation, widened mediastinum, and CT does chest was obtained, which revealed right pleural effusion, bibasilar consolidation, patchy infiltrates in bilateral lungs concerning for aspiration. Past Medical History:  History reviewed. No pertinent past medical history. Past Surgical History:  History reviewed. No pertinent surgical history. Medications (prior to admission):  Prior to Admission medications    Medication Sig Start Date End Date Taking? Authorizing Provider   acetaminophen (TYLENOL) 500 MG tablet Take 1,000 mg by mouth every 6 hours as needed for Pain or Fever   Yes Historical Provider, MD   LORazepam (ATIVAN) 0.5 MG tablet Take 0.5 mg by mouth every 8 hours as needed for Anxiety.    Yes Historical Provider, MD   azithromycin (ZITHROMAX) 250 MG tablet Take 250 mg by mouth three times a week Mon/Wed/Fri   Yes Historical Provider, MD   buPROPion (WELLBUTRIN XL) 300 MG extended release tablet Take 300 mg by mouth every morning   Yes Historical Provider, MD   apixaban (ELIQUIS) 5 MG TABS tablet Take 5 mg by mouth 2 times daily   Yes Historical Provider, MD   hydrOXYzine HCl (ATARAX) 25 MG tablet Take 25 mg by mouth in the morning, at noon, and at bedtime   Yes Historical Provider, MD   ipratropium-albuterol (DUONEB) 0.5-2.5 (3) MG/3ML SOLN nebulizer solution Inhale 1 vial into the lungs every 6 hours as needed for Shortness of Breath   Yes Historical Provider, MD   pregabalin (LYRICA) 50 MG capsule Take 50 mg by mouth 3 times daily. Yes Historical Provider, MD   metoprolol tartrate (LOPRESSOR) 25 MG tablet Take 25 mg by mouth 2 times daily   Yes Historical Provider, MD   mometasone (ASMANEX) 200 MCG/ACT AERO inhaler Inhale 2 puffs into the lungs 2 times daily   Yes Historical Provider, MD   montelukast (SINGULAIR) 10 MG tablet Take 10 mg by mouth nightly   Yes Historical Provider, MD   nicotine (NICODERM CQ) 21 MG/24HR Place 1 patch onto the skin every 24 hours   Yes Historical Provider, MD   predniSONE (DELTASONE) 20 MG tablet Take 20 mg by mouth See Admin Instructions 20 mg through 10/21/22, then decrease to 10 mg daily through 11/11/22   Yes Historical Provider, MD   pantoprazole (PROTONIX) 40 MG tablet Take 40 mg by mouth daily   Yes Historical Provider, MD   oxyCODONE (ROXICODONE) 5 MG immediate release tablet Take 5 mg by mouth every 6 hours as needed for Pain. Yes Historical Provider, MD   tamsulosin (FLOMAX) 0.4 MG capsule Take 0.4 mg by mouth at bedtime   Yes Historical Provider, MD       Allergy(ies):  Vancomycin    Social History:  Unable to obtain as patient is currently intubated. Family History:  Unable to obtain as patient is currently intubated. Review of Systems:  Unable to obtain as patient is currently intubated. Vitals and physical examination:  /61   Pulse (!) 126   Resp 19   Ht 5' 6\" (1.676 m)   Wt 190 lb 11.2 oz (86.5 kg)   SpO2 96%   BMI 30.78 kg/m²   Gen/overall appearance: Sedated, intubated. Head: Normocephalic, atraumatic  Eyes: Pupils equal bilaterally  ENT: ETT in place  Neck: No JVD, thyromegaly  CVS: Nml S1S2, no MRG. Tachycardic  Pulm: Clear bilaterally. No crackles/wheezes  Gastrointestinal: Distended.   Positive bowel sounds. Nontender to palpation. Musculoskeletal: No edema. Warm  Neuro: Sedated, intubated  Psychiatry: Unable to assess  Skin: No obvious rashes noted        Labs/imaging/EKG:  CBC:   Recent Labs     10/20/22  1626   WBC 17.3*   HGB 9.6*   *     BMP:    Recent Labs     10/20/22  1626      K 4.9   CL 91*   CO2 45*   BUN 15   CREATININE 0.8*   GLUCOSE 215*     Hepatic:   Recent Labs     10/20/22  1626   AST 31   ALT 30   BILITOT <0.2   ALKPHOS 70       CT HEAD WO CONTRAST    Result Date: 10/20/2022  EXAMINATION: CT OF THE HEAD WITHOUT CONTRAST  10/20/2022 4:00 pm TECHNIQUE: CT of the head was performed without the administration of intravenous contrast. Automated exposure control, iterative reconstruction, and/or weight based adjustment of the mA/kV was utilized to reduce the radiation dose to as low as reasonably achievable. COMPARISON: None. HISTORY: ORDERING SYSTEM PROVIDED HISTORY: Found down at nursing home sp/p ROSC TECHNOLOGIST PROVIDED HISTORY: Has a \"code stroke\" or \"stroke alert\" been called? ->No Reason for exam:->Found down at nursing home sp/p ROSC Decision Support Exception - unselect if not a suspected or confirmed emergency medical condition->Emergency Medical Condition (MA) Reason for Exam: Found down at nursing home sp/p ROSC FINDINGS: BRAIN/VENTRICLES: Examination limited by patient's motion. There is no acute intracranial hemorrhage, mass effect or midline shift. No abnormal extra-axial fluid collection. The gray-white differentiation is maintained without evidence of an acute infarct. There is no evidence of hydrocephalus. ORBITS: The visualized portion of the orbits demonstrate no acute abnormality. SINUSES: There is an air-fluid level in the the left maxillary sinus. Air-fluid levels are also noted in the the right and left sphenoid sinuses. There is mucosal thickening involving the right left ethmoid air cells. Frontal sinuses are unremarkable.  SOFT TISSUES/SKULL: There is suggestion of nondisplaced bilateral nasal bone fractures. Examination limited by patient's motion. No acute intracranial abnormalities noted. Sinusitis of the left maxillary sinus, right and left sphenoid sinuses, and bilateral ethmoid air cells. Suggestion of nondisplaced bilateral nasal bones fracture     CT CERVICAL SPINE WO CONTRAST    Result Date: 10/20/2022  EXAMINATION: CT OF THE CERVICAL SPINE WITHOUT CONTRAST 10/20/2022 4:00 pm: TECHNIQUE: CT of the cervical spine was performed without the administration of intravenous contrast. Multiplanar reformatted images are provided for review. Automated exposure control, iterative reconstruction, and/or weight based adjustment of the mA/kV was utilized to reduce the radiation dose to as low as reasonably achievable. COMPARISON: None available. HISTORY: ORDERING SYSTEM PROVIDED HISTORY: Found down at nursing home sp/p ROSC TECHNOLOGIST PROVIDED HISTORY: Reason for exam:->Found down at nursing home sp/p ROSC Decision Support Exception - unselect if not a suspected or confirmed emergency medical condition->Emergency Medical Condition (MA) Reason for Exam: Found down at nursing home sp/p ROSC FINDINGS: BONES/ALIGNMENT: Motion artifact degrades image quality. There is no acute fracture. The 7 cervical vertebral bodies are in anatomic alignment with straightening of the normal cervical lordosis. The craniocervical junction is intact. DEGENERATIVE CHANGES: There is mild multilevel spondylosis. SOFT TISSUES: There is no prevertebral soft tissue swelling. Endotracheal nasogastric tubes are insitu. Small right pleural effusion is incompletely imaged with right upper lobe airspace disease concerning for pneumonia. Air-fluid levels within the bilateral sphenoid sinuses are likely from recent intubation. 1.  No acute gross fracture.      XR CHEST PORTABLE    Result Date: 10/20/2022  EXAMINATION: ONE XRAY VIEW OF THE CHEST 10/20/2022 3:22 pm COMPARISON: None HISTORY: ORDERING SYSTEM PROVIDED HISTORY: Found down at nursing home sp/p ROSC TECHNOLOGIST PROVIDED HISTORY: Reason for exam:->Found down at nursing home sp/p ROSC Reason for Exam: Found down at nursing home sp/p ROSC FINDINGS: Endotracheal tube approximately 2 cm above the riya. Enteric catheter courses into the abdomen. Cardiomegaly. Right pleural effusion. Right basilar opacity. Right pleural effusion Right basilar opacity could represent atelectasis or infection Cardiomegaly     CTA CHEST ABDOMEN PELVIS W CONTRAST    Result Date: 10/20/2022  EXAMINATION: CTA OF THE CHEST, ABDOMEN AND PELVIS WITH CONTRAST 10/20/2022 4:00 pm: TECHNIQUE: CTA of the chest, abdomen and pelvis was performed after the administration of intravenous contrast.  Multiplanar reformatted images are provided for review. MIP images are provided for review. Automated exposure control, iterative reconstruction, and/or weight based adjustment of the mA/kV was utilized to reduce the radiation dose to as low as reasonably achievable. COMPARISON: None. HISTORY: ORDERING SYSTEM PROVIDED HISTORY: Found down, distended abdomen Reason for Exam: Found down at nursing home sp/p ROSC FINDINGS: CTA CHEST: Thoracic aorta: No evidence of thoracic aortic aneurysm or dissection. No acute abnormality of the aorta. Ascending thoracic aorta 27 mm and descending thoracic aorta 20 mm. Normal aortic arch branching and normal appearance of the aortic arch branches. No aorta aneurysm or dissection. Unremarkable appearance of the pulmonary arteries, main pulmonary artery 2.8 cm diameter. Mediastinum: No evidence of mediastinal lymphadenopathy. The heart is enlarged. Posterior mediastinal structures appear unremarkable. No adenopathy. Prominent supradiaphragmatic portion of the inferior vena cava as it enters the right atrium, 5 cm diameter. Lungs/Pleura: Endotracheal tube tip is mid trachea, level of the aortic arch.  NG tube courses within the esophagus, tip is at the proximal gastric body. No pneumothorax evident. Trace right pleural effusion. Dense right basilar consolidation and subsegmental atelectasis or infiltrate posterior left lower lobe. Patchy infiltrates are seen lateral within right middle lobe and medial within the left upper lobe and left parahilar lung. Another focus of consolidation is seen posterior right upper lobe No inspissated secretions or endobronchial lesion evident. Soft Tissues/Bones: No acute bone or soft tissue abnormality. Prominent thoracolumbar dextroscoliosis. CTA ABDOMEN/PELVIS: Abdominal aorta/Branches: Mild calcific atherosclerosis. Infrarenal abdominal aorta is 13 mm diameter. Normal contrast enhancement of the celiac axis, mesenteric artery, right and left renal arteries and inferior mesenteric artery. Normal contrast enhancement right and left common, internal and external iliac arteries and visualized portions of the bilateral femoral artery branches. Organs: Hypoattenuation throughout the liver reflects hepatic steatosis. No discrete hepatic lesion or intrahepatic bile duct dilatation is seen. The gallbladder demonstrates calcifications and is contracted. Kidneys, spleen and pancreas appear unremarkable. Mild nodularity bilateral adrenal glands typical of hyperplasia. Dominant right adrenal nodule 16 mm density 14 Hounsfield units reflects adenoma, dominant left adrenal nodule 12 mm, -5 Hounsfield units reflects adenoma (benign finding requiring no additional evaluation or follow-up). GI/Bowel: NG tube courses within the esophagus, tip is at the proximal gastric body. The stomach and small bowel appear unremarkable. No diffuse or focal small bowel wall thickening or inflammatory changes evident. No obstruction is seen. The appendix is visualized right lower quadrant, unremarkable in appearance. Moderate volume fecal debris throughout the colon typical of constipation.  The colon appears otherwise unremarkable. Pelvis: Urinary bladder is decompressed by Mendez catheter. Prostate gland, seminal vesicles and other portions of the pelvis appear unremarkable. No ascites or pneumoperitoneum. Peritoneum/Retroperitoneum: No pneumoperitoneum. Unremarkable appearance of the aorta. No aneurysm. Unremarkable appearance of the IVC. No adenopathy or fluid. Bones/Soft Tissues: No acute superficial soft tissue or osseous structure abnormality evident. 3D images:  3D reconstructed images were performed on a separate workstation and provided for review. These images were reviewed. 1.  CTA CHEST: Right pleural effusion with bibasilar consolidation as well as patchy infiltrates bilateral lungs which may be related to acute infectious inflammatory process or sequela of aspiration. 2.  Endotracheal tube tip is noted trachea, tip at the level of the aortic knob. 3. Prominent supradiaphragmatic portion of the inferior vena cava as it enters the right atrium, 5 cm diameter. 4. CTA ABDOMEN/PELVIS: No acute abnormality. 5.  Cholelithiasis and contracted gallbladder without other findings of acute cholecystitis. 6. Hepatic steatosis. 7. NG tube courses within the esophagus, tip is at the proximal gastric body. 8. Bilateral adrenal nodular hyperplasia and bilateral adrenal adenomas (benign finding requiring no additional evaluation or follow-up). EKG: EKG has been ordered and currently pending.     Discussed with JAME LANCASTER.      Thank you Blossom Frazier DO for the opportunity to be involved in this patient's care.    -----------------------------  Chloe Pena MD  [facility] hospitalist

## 2022-10-20 NOTE — ED NOTES
Pt arrived to ED rm 14 at 1500. Pt to ED from [place] [facility] via [place] [facility] s/p cardiac arrest.  Per EMS report, pt was found down unresponsive in the nursing home with his oxygen off and absent pulse. Nursing home reports that pt has COPD and non-compliant with his oxygen. CPR started by nursing home staff 10 min prior to EMS arrival.  EMS then continues CPR for 20 min with ROSC being obtained. Prior to ROSC being obtained, EMS gave a total of 4 epi and 2 bicarb. EMS report approx 200mls of emesis they suctioned. Upon ED arrival, pt unresponsive, pulse present, yolie airway in place per EMS with pt being ventilated via bag valve. ED MD Mcbride and RT at bedside upon pt arrival to ED preparing to intubate.         Allen Almanzar, RN  10/20/22 [address] [facility] [address], RN  10/20/22 2800

## 2022-10-20 NOTE — ED NOTES
20mg of etomidate and 100mg of succinylcholine given via IV per verbal order by Dr. Ventura Jarrell pre-intubation. Pt successfully intubated by Dr. Ventura Jarrell at 8215 with size 7.5 ETT, 26 at the lip, positive breath sounds bilaterally, and positive color change.        Chaim Ames RN  10/20/22 7543

## 2022-10-21 PROBLEM — R09.2 RESPIRATORY ARREST (HCC): Status: ACTIVE | Noted: 2022-01-01

## 2022-10-21 NOTE — PROGRESS NOTES
Called back to the room secondary to the worsening hypoxemia. Some secretions noted in ET tube. Ultrasound exam of the chest demonstrated lung sliding on the left with difficulty assessing lung sliding on the right. Stat chest x-ray was completed without demonstration of a pneumothorax. Collapse of the right upper lobe appeared to be worse. Bronchoscopy was completed. Purulent secretions noted at right upper lobe with some mucous plugging noted at the right upper lobe and right middle lobe. Mucosa with thick flaky white material which could be consistent with yeast.    Unfortunately, mucous plugs that were removed was removed would not be expected to make a sizable difference. After procedure, ventilator changes without significant change to hypoxemia. Manual bagging completed with improvement in hypoxemia. This was with a inspiratory time of approximately 3 seconds with respiratory rate of approximately 9. When pressure control was set to these estimated settings, hypoxemia improves with a minute ventilation of approximately 2.5. Therefore, this would not be compatible a prolonged period of time secondary to acidemia. Increased respiratory rate. Blood gas in 1 hour. discussed with palliative care. This does not appear to be survivable. I spent 75 minutes of critical care time with this patient excluding any procedures.

## 2022-10-21 NOTE — PROGRESS NOTES
4 Eyes Skin Assessment     NAME:  Monica Cantu  YOB: 1980  MEDICAL RECORD NUMBER:  [id number]    The patient is being assess for  Admission    I agree that 2 RN's have performed a thorough Head to Toe Skin Assessment on the patient. ALL assessment sites listed below have been assessed. Areas assessed by both nurses:    Head, Face, Ears, Shoulders, Back, Chest, Arms, Elbows, Hands, Sacrum. Buttock, Coccyx, Ischium, and Legs. Feet and Heels        Does the Patient have a Wound? Yes wound(s) were present on assessment.  LDA wound assessment was Initiated and completed        Trino Prevention initiated:  Yes   Wound Care Orders initiated:  No    Pressure Injury (Stage 3,4, Unstageable, DTI, NWPT, and Complex wounds) if present place referral/consult order under [de-identified] NA    New and Established Ostomies if present place consult order under : NA      Nurse 1 eSignature: Electronically signed by Arthur Benz RN on 10/20/22 at 11:58 PM EDT    **SHARE this note so that the co-signing nurse is able to place an eSignature**    Nurse 2 eSignature: Electronically signed by Mariann Perla RN on 10/21/22 at 4:34 AM EDT

## 2022-10-21 NOTE — PROGRESS NOTES
Hospitalist Progress Note      PCP: Hakeem Garcia DO    Date of Admission: 10/20/2022    Chief Complaint:   Cardiac arrest    Hospital Course: This is a pleasant 36 y.o. male with history of severe COPD, chronic respiratory failure with hypoxia and hypercapnia, alcohol abuse, chronic pancreatitis, tobacco use disorder, GERD, BPH, Eliquis use (indication noted in epic states IVC aneurysm), who was brought to the emergency room for evaluation of unresponsiveness at nursing home, reportedly pulseless at the time of initial evaluation by EMS. It was reported the patient the patient vomited to, after which he went into cardiac arrest (no shockable rhythm). Resuscitation attempts lasted about 30 minutes before eventual return of spontaneous circulation; he received 4 doses of epinephrine, 2 A of bicarb. On arrival to the emergency room, patient was endotracheally intubated, and this was complicated by difficulty with oxygenation, as he had persistent hypoxemia. Chest x-ray was concerning for right tracheal deviation, widened mediastinum, and CT does chest was obtained, which revealed right pleural effusion, bibasilar consolidation, patchy infiltrates in bilateral lungs concerning for aspiration.     Subjective:   Sedated/ on vent       Medications:  Reviewed    Infusion Medications    phenylephrine (AYDEN-SYNEPHRINE) 50mg/250mL infusion 21 mcg/min (10/21/22 0902)    propofol      fentaNYL Stopped (10/20/22 2015)    sodium chloride      dextrose       Scheduled Medications    sodium phosphate IVPB  20 mmol IntraVENous Once    potassium chloride  20 mEq IntraVENous Once    calcium gluconate-NaCl  1,000 mg IntraVENous Once    docusate  100 mg Oral Daily    senna  10 mL Oral Nightly    vecuronium  10 mg IntraVENous Once    insulin lispro  0-8 Units SubCUTAneous Q4H    magnesium sulfate  2,000 mg IntraVENous Once    polyethylene glycol  17 g Oral Daily    budesonide  1 mg Nebulization BID    lactobacillus  2 capsule Oral BID WC    mupirocin   Nasal BID    heparin (porcine)  5,000 Units SubCUTAneous 3 times per day    chlorhexidine  15 mL Mouth/Throat BID    famotidine (PEPCID) injection  20 mg IntraVENous BID    sodium chloride flush  5-40 mL IntraVENous 2 times per day    piperacillin-tazobactam  3,375 mg IntraVENous Q8H    linezolid  600 mg IntraVENous Q12H    [Held by provider] apixaban  5 mg Oral BID    [Held by provider] buPROPion  300 mg Oral QAM    montelukast  10 mg Oral Nightly    [Held by provider] pregabalin  50 mg Oral TID    [Held by provider] tamsulosin  0.4 mg Oral Nightly    methylPREDNISolone  60 mg IntraVENous Q6H    ipratropium-albuterol  1 ampule Inhalation Q4H     PRN Meds: fentaNYL **AND** fentaNYL, sodium chloride flush, sodium chloride, polyethylene glycol, acetaminophen **OR** acetaminophen, glucose, dextrose bolus **OR** dextrose bolus, glucagon (rDNA), dextrose, naloxone      Intake/Output Summary (Last 24 hours) at 10/21/2022 0930  Last data filed at 10/21/2022 0855  Gross per 24 hour   Intake 2398.49 ml   Output 900 ml   Net 1498.49 ml       Physical Exam Performed:    /67   Pulse (!) 136   Temp (!) 93.1 °F (33.9 °C) (Axillary)   Resp 29   Ht 5' 6\" (1.676 m)   Wt 167 lb 8.8 oz (76 kg)   SpO2 96%   BMI 27.04 kg/m²     General appearance: No apparent distress, appears stated age and cooperative. HEENT: Pupils equal, round, and reactive to light. Conjunctivae/corneas clear. Neck: Supple, with full range of motion. No jugular venous distention. Trachea midline. Respiratory:  Normal respiratory effort. Clear to auscultation, bilaterally without Rales/Wheezes/Rhonchi. Cardiovascular: Regular rate and rhythm with normal S1/S2 without murmurs, rubs or gallops. Abdomen: Soft, non-tender, non-distended with normal bowel sounds. Musculoskeletal: No clubbing, cyanosis or edema bilaterally. Full range of motion without deformity.   Skin: Skin color, texture, turgor normal.  No rashes or lesions. Neurologic:  Neurovascularly intact without any focal sensory/motor deficits. Cranial nerves: II-XII intact, grossly non-focal.  Psychiatric: Alert and oriented, thought content appropriate, normal insight  Capillary Refill: Brisk, 3 seconds, normal   Peripheral Pulses: +2 palpable, equal bilaterally       Labs:   Recent Labs     10/20/22  1626 10/21/22  0428   WBC 17.3* 21.9*   HGB 9.6* 9.7*   HCT 32.3* 33.4*   * 509*     Recent Labs     10/20/22  1626 10/21/22  0420    145   K 4.9 3.8   CL 91* 94*   CO2 45* 37*   BUN 15 20   CREATININE 0.8* 1.2   CALCIUM 9.0 7.8*   PHOS  --  2.2*     Recent Labs     10/20/22  1626 10/21/22  0420   AST 31 29   ALT 30 26   BILITOT <0.2 0.3   ALKPHOS 70 38*     No results for input(s): INR in the last 72 hours. Recent Labs     10/20/22  1626   CKTOTAL 76   TROPONINI <0.01       Urinalysis:      Lab Results   Component Value Date/Time    NITRU Negative 10/20/2022 03:28 PM    [address] 10/20/2022 03:28 PM    BACTERIA 1+ 10/20/2022 03:28 PM    RBCUA 1 10/20/2022 03:28 PM    BLOODU Negative 10/20/2022 03:28 PM    SPECGRAV 1.024 10/20/2022 03:28 PM    GLUCOSEU 100 10/20/2022 03:28 PM       Radiology:  XR CHEST PORTABLE   Final Result   1. Interval worsening opacification of the left lung. 2. Persistent right lower lung zone opacity. XR ABDOMEN (KUB) (SINGLE AP VIEW)   Final Result   No significant gaseous distention in the abdomen. NG tube tip projects in   region of stomach         CT HEAD WO CONTRAST   Final Result   Examination limited by patient's motion. No acute intracranial abnormalities   noted. Sinusitis of the left maxillary sinus, right and left sphenoid   sinuses, and bilateral ethmoid air cells. Suggestion of nondisplaced   bilateral nasal bones fracture         CT CERVICAL SPINE WO CONTRAST   Final Result   1. No acute gross fracture. CTA CHEST ABDOMEN PELVIS W CONTRAST   Final Result   1.   CTA CHEST: Right pleural effusion with bibasilar consolidation as well as   patchy infiltrates bilateral lungs which may be related to acute infectious   inflammatory process or sequela of aspiration. 2.  Endotracheal tube tip is noted trachea, tip at the level of the aortic   knob. 3. Prominent supradiaphragmatic portion of the inferior vena cava as it   enters the right atrium, 5 cm diameter. 4. CTA ABDOMEN/PELVIS: No acute abnormality. 5.  Cholelithiasis and contracted gallbladder without other findings of acute   cholecystitis. 6. Hepatic steatosis. 7. NG tube courses within the esophagus, tip is at the proximal gastric body. 8. Bilateral adrenal nodular hyperplasia and bilateral adrenal adenomas   (benign finding requiring no additional evaluation or follow-up). XR CHEST PORTABLE   Final Result   Right pleural effusion      Right basilar opacity could represent atelectasis or infection      Cardiomegaly                 Assessment/Plan:    Cardiorespiratory arrest/not shockable rhythm. Patient had return of spontaneous circulation after about 30 minutes of CPR, eventually endotracheally intubated in the emergency room. Check echocardiogram.  Telemetry monitoring. Continue supportive measures while on ventilator. Acute on chronic respiratory failure with hypoxia and hypercapnia. Suspect secondary to aspiration pneumonia. Currently endotracheally intubated. Critical care on board and assisting with ventilator management. Aspiration/bacterial pneumonia, sepsis secondary to pneumonia. Continue IV antibiotics (Zosyn and Zyvox). Continue intravenous fluid and trend lactic acid level. On ventilator support as noted above. Critical care on board and assisting with management. Abdomen is distended on exam.  However, CT-abdomen/pelvis was unremarkable for acute pathology.   Other comorbidities: history of severe COPD, chronic respiratory failure with hypoxia and hypercapnia, alcohol abuse, chronic pancreatitis, tobacco use disorder, GERD, BPH, Eliquis use (indication noted in epic states IVC aneurysm).         DVT Prophylaxis: eliquis  Diet: Diet NPO  Code Status: Full Code  PT/OT Eval Status: ordered    Dispo - pending clinical improvement    Appropriate for A1 Discharge Unit: No      Adilson Marvin MD

## 2022-10-21 NOTE — PROGRESS NOTES
4 Eyes Skin Assessment     NAME:  Jennifer Haynes  YOB: 1980  MEDICAL RECORD NUMBER:  [id number]    The patient is being assess for  Shift Handoff    I agree that 2 RN's have performed a thorough Head to Toe Skin Assessment on the patient. ALL assessment sites listed below have been assessed. Areas assessed by both nurses:    Head, Face, Ears, Shoulders, Back, Chest, Arms, Elbows, Hands, Sacrum. Buttock, Coccyx, Ischium, and Legs. Feet and Heels        Does the Patient have a Wound? Yes wound(s) were present on assessment.  LDA wound assessment was Initiated and completed        Trino Prevention initiated:  Yes   Wound Care Orders initiated:  No    Pressure Injury (Stage 3,4, Unstageable, DTI, NWPT, and Complex wounds) if present place referral/consult order under [de-identified] NA    New and Established Ostomies if present place consult order under : NA      Nurse 1 eSignature: Electronically signed by Kerri Crespo RN on 10/21/22 at 4:47 AM EDT    **SHARE this note so that the co-signing nurse is able to place an eSignature**    Nurse 2 eSignature: Electronically signed by Jacob Rush RN on 10/21/22 at 7:45 AM EDT

## 2022-10-21 NOTE — PROGRESS NOTES
Comprehensive Nutrition Assessment    Type and Reason for Visit:  Initial (New Vent)    Nutrition Recommendations/Plan:   Continue NPO  Monitor POC for possible need of nutrition support     Malnutrition Assessment:  Malnutrition Status:  Insufficient data (10/21/22 1215)    Context:  Chronic Illness       Nutrition Assessment:    From NH s/p cardiac arrest. Intubated and sedated. Sedated with propofol at 13.7ml/hr providing 362 kcals daily. +Jacques. Currently NPO. Palliative care following d/t poor prognosis. Code changed to DNR with agressive care to continue for one more day. Will continue to monitor prognosis for withdrawal of care vs nutrition support. Nutrition Related Findings:    Phos 2.2. Glucose 170. Wound Type: None       Current Nutrition Intake & Therapies:    Average Meal Intake: NPO  Average Supplements Intake: NPO  Diet NPO  Additional Calorie Sources:  Propofol at 13.7ml/hr providing 362 kcals daily. Anthropometric Measures:  Height: 5' 6\" (167.6 cm)  Ideal Body Weight (IBW): 142 lbs (65 kg)    Admission Body Weight: 190 lb (86.2 kg)  Current Body Weight: 167 lb (75.8 kg), 117.6 % IBW. Weight Source: Bed Scale  Current BMI (kg/m2): 27  Weight Adjustment For: No Adjustment  BMI Categories: Overweight (BMI 25.0-29. 9)    Estimated Daily Nutrient Needs:  Energy Requirements Based On: Kcal/kg  Weight Used for Energy Requirements: Current  Energy (kcal/day): 7834-8446 (25-30kcal/76kg)  Weight Used for Protein Requirements: Current  Protein (g/day):  (1.2-2.0g/76kg)  Method Used for Fluid Requirements: Other (Comment)  Fluid (ml/day): per provider    Nutrition Diagnosis:   Inadequate oral intake related to impaired respiratory function as evidenced by intubation, NPO or clear liquid status due to medical condition    Nutrition Interventions:   Food and/or Nutrient Delivery: Continue NPO  Nutrition Education/Counseling: Education not indicated  Coordination of Nutrition Care: Continue to monitor

## 2022-10-21 NOTE — PROGRESS NOTES
Pulmonary Progress Note    Date of Admission: 10/20/2022   LOS: 1 day       CC:  Chief Complaint   Patient presents with    Cardiac Arrest     Pt to ED from [place] [address] post acute assisted via [place] [address] [facility] s/p cardiac arrest.  Per EMS report, pt was found down unresponsive in the nursing home with absent pulse. CPR started by nursing home staff 10 min prior to EMS arrival.  EMS then continues CPR for 20 min with ROSC being obtained. Subjective:  He was admitted from NH with aspiration with severe hypoxemia with ridge chest    ROS:   Unable to assess     Assessment:          Plan: This note may have been transcribed using [id number] [place]. Please disregard any translational errors. Hospital Day: 1     Acute on chronic hypercapnia   Improved pH and pco2  Multiple vent changes over night   Stop biarb drip       Rigidity    Monitor. May be improving      S/p arrest   Sedation: propofol @ 30   Not able to lighten sedation secondary to vent status. Would strongly recommend change of code status. Emphysema / Vent   Noted on CT at [facility]  Alpha 1 was normal.   Severe air trapping. iPEEP 18 . Increased peep to 18. AC PC 21/18. Wean O2 to sat >90%   budesonide 1 gm bid. Duoneb's  q 4   Solumedrol 60 mg q6       Pneumonia  Likely aspiration   Zyvox, Zosyn     Lactic acidosis   Increasing. Likely secondary to albuterol. Decreased tension of abd on exam          Prophylaxis  - GI - pepcid    - DVT -   heparin - eliquis on hold     - VAP - peridex  - C.  Diff - Lactobacillus  - Nasal Decolonization - Bactroban    Nutrition  - Diet NPO  -   Intake/Output Summary (Last 24 hours) at 10/21/2022 2428  Last data filed at 10/21/2022 2836  Gross per 24 hour   Intake 2348.65 ml   Output 900 ml   Net 1448.65 ml       Mobility       Access  Arterial      PICC          CVC       CVC Triple Lumen 10/20/22 Right Femoral (Active)   $ Central line insertion $ Yes 10/20/22 1851   Central Line Being Utilized Yes 10/21/22 0600   Criteria for Appropriate Use Hemodynamically unstable, requiring monitoring lines, vasopressors, or volume resuscitation 10/21/22 0600   Site Assessment Clean, dry & intact 10/21/22 0600   Phlebitis Assessment No symptoms 10/21/22 0600   Infiltration Assessment 0 10/21/22 0600   Color/Movement/Sensation Capillary refill less than 3 sec 10/21/22 0600   Proximal Lumen Color/Status White;Flushed;Capped; Infusing 10/21/22 0600   Medial Lumen Status Blue;Brisk blood return; Infusing 10/21/22 0600   Distal Lumen Color/Status Brown;Normal saline locked; Capped; Infusing 10/21/22 0600   Line Care Connections checked and tightened 10/21/22 0600   Alcohol Cap Used Yes 10/21/22 0600   Date of Last Dressing Change 10/20/22 10/21/22 0600   Dressing Type Transparent; Antimicrobial 10/21/22 0600   Dressing Status Clean, dry & intact 10/21/22 0600   Number of days: 0        Extremely poor prognosis. I spent 35 minutes of critical care time with this patient excluding any procedures. Data:        PHYSICAL EXAM:   Blood pressure 104/67, pulse (!) 122, temperature (!) 93.1 °F (33.9 °C), temperature source Axillary, resp. rate 22, height 5' 6\" (1.676 m), weight 167 lb 8.8 oz (76 kg), SpO2 100 %.'  Body mass index is 27.04 kg/m². Gen: No distress. ENT:   Resp: poor breath sounds   CV: Regular rate. Regular rhythm. No murmur or rub. No edema. Skin: Warm, dry, normal texture and turgor. No nodule on exposed extremities. Gi decreased bowel sounds   M/S: No cyanosis. No clubbing. No joint deformity.   Psych: sedated      Medications:    Scheduled Meds:   sodium phosphate IVPB  20 mmol IntraVENous Once    magnesium sulfate  2,000 mg IntraVENous Once    heparin (porcine)  5,000 Units SubCUTAneous 3 times per day    sodium chloride  30 mL/kg (Ideal) IntraVENous Once    chlorhexidine  15 mL Mouth/Throat BID    famotidine (PEPCID) injection  20 mg IntraVENous BID    sodium chloride flush  5-40 mL IntraVENous 2 times per day    piperacillin-tazobactam  3,375 mg IntraVENous Q8H    linezolid  600 mg IntraVENous Q12H    [Held by provider] apixaban  5 mg Oral BID    [Held by provider] buPROPion  300 mg Oral QAM    montelukast  10 mg Oral Nightly    [Held by provider] pregabalin  50 mg Oral TID    [Held by provider] tamsulosin  0.4 mg Oral Nightly    methylPREDNISolone  60 mg IntraVENous Q6H    insulin lispro  0-4 Units SubCUTAneous 6 times per day    ipratropium-albuterol  1 ampule Inhalation Q4H    budesonide  0.5 mg Nebulization BID       Continuous Infusions:   phenylephrine (AYDEN-SYNEPHRINE) 50mg/250mL infusion 30 mcg/min (10/21/22 0626)    propofol 30 mcg/kg/min (10/21/22 0626)    dexmedetomidine Stopped (10/20/22 2015)    fentaNYL Stopped (10/20/22 2015)    sodium chloride      norepinephrine Stopped (10/20/22 2015)    sodium chloride      dextrose      sodium bicarbonate in sterile water infusion 50 mL/hr at 10/21/22 0145       PRN Meds:  albuterol sulfate HFA **AND** ipratropium, fentaNYL **AND** fentaNYL, sodium chloride flush, sodium chloride, polyethylene glycol, acetaminophen **OR** acetaminophen, glucose, dextrose bolus **OR** dextrose bolus, glucagon (rDNA), dextrose, naloxone    Labs reviewed:  CBC:   Recent Labs     10/20/22  1626 10/21/22  0428   WBC 17.3* 21.9*   HGB 9.6* 9.7*   HCT 32.3* 33.4*   MCV 84.8 85.9   * 509*     BMP:   Recent Labs     10/20/22  1626 10/21/22  0420    145   K 4.9 3.8   CL 91* 94*   CO2 45* 37*   PHOS  --  2.2*   BUN 15 20   CREATININE 0.8* 1.2     LIVER PROFILE:   Recent Labs     10/20/22  1626 10/21/22  0420   AST 31 29   ALT 30 26   BILITOT <0.2 0.3   ALKPHOS 70 38*     PT/INR: No results for input(s): PROTIME, INR in the last 72 hours. APTT: No results for input(s): APTT in the last 72 hours.   UA:  Recent Labs     10/20/22  1528   COLORU Yellow   PHUR 5.5   WBCUA 9*   RBCUA 1   BACTERIA 1+*   CLARITYU Clear   SPECGRAV 1.024   LEUKOCYTESUR Negative UROBILINOGEN 0.2   BILIRUBINUR Negative   BLOODU Negative   GLUCOSEU 100*     No results for input(s): PH, PCO2, PO2 in the last 72 hours. Cx:      Films:  Radiology Review:  Pertinent images / reports were reviewed as a part of this visit. CT Chest w/ contrast: No results found for this or any previous visit. CT Chest w/o contrast: No results found for this or any previous visit. CTPA: No results found for this or any previous visit. CXR PA/LAT: No results found for this or any previous visit. CXR portable: Results for orders placed during the hospital encounter of 10/20/22    XR CHEST PORTABLE    Narrative  EXAMINATION:  ONE XRAY VIEW OF THE CHEST    10/21/2022 5:10 am    COMPARISON:  10/20/2022    HISTORY:  ORDERING SYSTEM PROVIDED HISTORY: Intubated patient  TECHNOLOGIST PROVIDED HISTORY:  Reason for exam:->Intubated patient  Reason for Exam: intubated    FINDINGS:  Again seen overlying endotracheal and enteric tubes. Again seen prominence  of the cardiac silhouette. Interval worsening opacification of the left  lung. Persistent right lower lung zone opacity. No evidence of  pneumothorax. No acute osseous abnormalities. Impression  1. Interval worsening opacification of the left lung. 2. Persistent right lower lung zone opacity. This note was transcribed using [id number] [employer]. Please disregard any translational errors.       Jennifer Tran [facility], [phone]

## 2022-10-21 NOTE — PROGRESS NOTES
Palliative care was able to contact patient's sister. Reviewed medical care. We discussed extremely poor prognosis. She expressed understanding. Agreed to change CODE STATUS to a DNR/DNI but continue aggressive care for 1 further day. Withdrawal of care is being considered.

## 2022-10-21 NOTE — PLAN OF CARE
Problem: Cardiovascular - Adult  Goal: Maintains optimal cardiac output and hemodynamic stability  Outcome: Not Progressing     Problem: Respiratory - Adult  Goal: Achieves optimal ventilation and oxygenation  Outcome: Not Progressing     Problem: Safety - Adult  Goal: Free from fall injury  Outcome: Progressing     Problem: ABCDS Injury Assessment  Goal: Absence of physical injury  Outcome: Progressing     Problem: Metabolic/Fluid and Electrolytes - Adult  Goal: Glucose maintained within prescribed range  Outcome: Progressing     Problem: Skin/Tissue Integrity - Adult  Goal: Skin integrity remains intact  Outcome: Progressing  Flowsheets (Taken 10/20/2022 1903)  Skin Integrity Remains Intact:   Monitor for areas of redness and/or skin breakdown   Assess vascular access sites hourly   Every 4-6 hours minimum: Change oxygen saturation probe site     Problem: Cardiovascular - Adult  Goal: Maintains optimal cardiac output and hemodynamic stability  Outcome: Not Progressing     Problem: Respiratory - Adult  Goal: Achieves optimal ventilation and oxygenation  Outcome: Not Progressing

## 2022-10-21 NOTE — PROGRESS NOTES
Patient admitted to ICU 2108 from ED. Patient unresponsive to all stimuli, no gag/cough, no pupillary or corneal reflexes noted. Remains on ventilator, SpO2 90% upon arrival.  Normal saline bolus and Zyvox infusing. Orders released and reviewed. Will continue to monitor.     Pam Cope RN

## 2022-10-21 NOTE — PROGRESS NOTES
Pts other sibling, Mickey Kerr, came to bedside this evening. Pts sister stated she had found out about his current state via Facebook. Ta Zheng stated that her and other sister who we have been communicating with, Magdiel Borges, are not on speaking terms. Ta Zheng has not been aware of Cosmo's state (I.e living arrangements and decline of health) for multiple years. This RN gave Ta Zheng an update of his plan of care and his change of code status per Kim's wishes. Ta Zheng was very tearful with this conversation. All questions answered, voicemail left with Palliative care regarding [address]rel[address] 56 visit. [facility] updated.

## 2022-10-21 NOTE — PROGRESS NOTES
4 Eyes Skin Assessment     NAME:  Claudean Stade  YOB: 1980  MEDICAL RECORD NUMBER:  [id number]    The patient is being assess for  Shift Handoff    I agree that 2 RN's have performed a thorough Head to Toe Skin Assessment on the patient. ALL assessment sites listed below have been assessed. Areas assessed by both nurses:    Head, Face, Ears, Shoulders, Back, Chest, Arms, Elbows, Hands, Sacrum. Buttock, Coccyx, Ischium, and Legs. Feet and Heels        Does the Patient have a Wound?  No noted wound(s)       Trino Prevention initiated:  Yes   Wound Care Orders initiated:  No    Pressure Injury (Stage 3,4, Unstageable, DTI, NWPT, and Complex wounds) if present place referral/consult order under [de-identified] No    New and Established Ostomies if present place consult order under : No      Nurse 1 eSignature: Electronically signed by Wally Stoner RN on 10/21/22 at 5:24 PM EDT    **SHARE this note so that the co-signing nurse is able to place an eSignature**    Nurse 2 eSignature: Electronically signed by Brigido Vázquez RN on 10/21/22 at 7:30 PM EDT

## 2022-10-21 NOTE — PLAN OF CARE
Problem: Discharge Planning  Goal: Discharge to home or other facility with appropriate resources  Outcome: Not Progressing  Flowsheets (Taken 10/20/2022 2243 by Martita Gilbert, RN)  Discharge to home or other facility with appropriate resources:   Identify barriers to discharge with patient and caregiver   Identify discharge learning needs (meds, wound care, etc)   Refer to discharge planning if patient needs post-hospital services based on physician order or complex needs related to functional status, cognitive ability or social support system   Arrange for needed discharge resources and transportation as appropriate   Arrange for interpreters to assist at discharge as needed     Problem: Pain  Goal: Verbalizes/displays adequate comfort level or baseline comfort level  Outcome: Not Progressing     Problem: Cardiovascular - Adult  Goal: Maintains optimal cardiac output and hemodynamic stability  10/21/2022 0042 by Martita Gilbert RN  Outcome: Not Progressing     Problem: Respiratory - Adult  Goal: Achieves optimal ventilation and oxygenation  10/21/2022 1211 by Gabby Mabry RN  Outcome: Not Progressing  10/21/2022 0042 by Martita Gilbert RN  Outcome: Not Progressing     Problem: Gastrointestinal - Adult  Goal: Maintains adequate nutritional intake  Outcome: Not Progressing     Problem: Discharge Planning  Goal: Discharge to home or other facility with appropriate resources  Outcome: Not Progressing  Flowsheets (Taken 10/20/2022 2243 by Martita Gilbert, RN)  Discharge to home or other facility with appropriate resources:   Identify barriers to discharge with patient and caregiver   Identify discharge learning needs (meds, wound care, etc)   Refer to discharge planning if patient needs post-hospital services based on physician order or complex needs related to functional status, cognitive ability or social support system   Arrange for needed discharge resources and transportation as appropriate   Arrange for interpreters to assist at discharge as needed     Problem: Pain  Goal: Verbalizes/displays adequate comfort level or baseline comfort level  Outcome: Not Progressing     Problem: Cardiovascular - Adult  Goal: Maintains optimal cardiac output and hemodynamic stability  10/21/2022 0042 by Esteban Bragg RN  Outcome: Not Progressing     Problem: Respiratory - Adult  Goal: Achieves optimal ventilation and oxygenation  10/21/2022 1211 by Ana Giang RN  Outcome: Not Progressing  10/21/2022 0042 by Esteban Bragg RN  Outcome: Not Progressing     Problem: Gastrointestinal - Adult  Goal: Maintains adequate nutritional intake  Outcome: Not Progressing

## 2022-10-21 NOTE — PROGRESS NOTES
Patient unresponsive for majority of the shift, now will have non-purposeful movement with stimuli. Issues with ventilator compliance throughout the shift, Dr. Lia Dos Santos and Dr. Luis Contreras aware of patient status, new orders received throughout the evening. Propofol and Jacques-synephrine started per MD orders. Copious amounts of tan oral secretions from ETT throughout shift. Temperature 93.1 upon arrival to the unit, vj hugger applied. Temperature continued to rise throughout shift, t-max 100.8 rectally, vj hugger removed and rectal Tylenol given. Report and handoff to [facility].     Benito Luna RN

## 2022-10-21 NOTE — CARE COORDINATION
Chart Reviewed. Currently Vented. Palliative Care involved and working with family. Call placed to Zain at [address]  She reports he is from LTC unit. Able to return. Zain thinks he may need a prior auth and will check it. Would need a covid test within 48 hours. Following for DC needs.   [place], [place]     Case Management   [phone]    10/21/2022  12:11 PM

## 2022-10-21 NOTE — CONSULTS
[facility]  Palliative Care   Consult Note    NAME:  Juju Haile [facility] RECORD NUMBER:  [id number]  AGE: 36 y.o. GENDER: male  : 1980  TODAY'S DATE:  10/21/2022    Subjective     Reason for Consult:  goals of care  Visit Type: Initial Consult      Jenny Ramirez is a 36 y.o. male referred by:   [x] Physician    PAST MEDICAL HISTORY  History reviewed. No pertinent past medical history. PAST SURGICAL HISTORY  History reviewed. No pertinent surgical history. FAMILY HISTORY  History reviewed. No pertinent family history. SOCIAL HISTORY  Social History     Tobacco Use    Smoking status: Unknown   Vaping Use    Vaping Use: Unknown       ALLERGIES  Allergies   Allergen Reactions    Vancomycin      Unknown reaction       MEDICATIONS  No current facility-administered medications on file prior to encounter. Current Outpatient Medications on File Prior to Encounter   Medication Sig Dispense Refill    acetaminophen (TYLENOL) 500 MG tablet Take 1,000 mg by mouth every 6 hours as needed for Pain or Fever      LORazepam (ATIVAN) 0.5 MG tablet Take 0.5 mg by mouth every 8 hours as needed for Anxiety. azithromycin (ZITHROMAX) 250 MG tablet Take 250 mg by mouth three times a week Mon/Wed/Fri      buPROPion (WELLBUTRIN XL) 300 MG extended release tablet Take 300 mg by mouth every morning      apixaban (ELIQUIS) 5 MG TABS tablet Take 5 mg by mouth 2 times daily      hydrOXYzine HCl (ATARAX) 25 MG tablet Take 25 mg by mouth in the morning, at noon, and at bedtime      ipratropium-albuterol (DUONEB) 0.5-2.5 (3) MG/3ML SOLN nebulizer solution Inhale 1 vial into the lungs every 6 hours as needed for Shortness of Breath      pregabalin (LYRICA) 50 MG capsule Take 50 mg by mouth 3 times daily.       metoprolol tartrate (LOPRESSOR) 25 MG tablet Take 25 mg by mouth 2 times daily      mometasone (ASMANEX) 200 MCG/ACT AERO inhaler Inhale 2 puffs into the lungs 2 times daily      montelukast (SINGULAIR) 10 MG tablet Take 10 mg by mouth nightly      nicotine (NICODERM CQ) 21 MG/24HR Place 1 patch onto the skin every 24 hours      predniSONE (DELTASONE) 20 MG tablet Take 20 mg by mouth See Admin Instructions 20 mg through 10/21/22, then decrease to 10 mg daily through 11/11/22      pantoprazole (PROTONIX) 40 MG tablet Take 40 mg by mouth daily      oxyCODONE (ROXICODONE) 5 MG immediate release tablet Take 5 mg by mouth every 6 hours as needed for Pain.      tamsulosin (FLOMAX) 0.4 MG capsule Take 0.4 mg by mouth at bedtime         Objective         /67   Pulse (!) 146   Temp (!) 101.6 °F (38.7 °C) (Rectal)   Resp 29   Ht 5' 6\" (1.676 m)   Wt 167 lb 8.8 oz (76 kg)   SpO2 93%   BMI 27.04 kg/m²     Code Status: Full Code    Advanced Directives: none on record has sister Tashi Flores [phone]     Assessment        Management and Education    Persons available for education:        [] Self     [] Caregiver       [] Spouse       [] Other Family Member   []  Other    Spiritual History:  notified: Yes,     Does the patient have a Primary Care Physician? Yes     Palliative Performance Scale:  60% [] Ambulation reduced; Significant disease; Can't do hobbies/housework; intake normal or reduced; occasional assist; LOC full/confusion  50% [] Mainly sit/lie; Extensive disease; Can't do any work; Considerable assist; intake normal or reduced; LOC full/confusion  40% [] Mainly in bed; Extensive disease; Mainly assist; intake normal or reduced; occasional assist; LOC full/confusion  30% [] Bed Bound; Extensive disease; Total care; intake reduced; LOC full/confusion  20% [x] Bed Bound; Extensive disease; Total care; intake minimal; Drowsy/coma  10% [] Bed Bound; Extensive disease;  Total care; Mouth care only; Drowsy/coma  0 [] Death    Level of patient/caregiver understanding able to:        [] Verbalize Understanding   [] Demonstrate Understanding       [] Teach Back       [] Needs Reinforcement     [] Other: Teaching Time:  1hours  0 minutes     Plan        Social Service Consult Made:  Yes  Assistance filling out Living Will/HPOA:  No      Discharge Plan:  Palliative care orders introduced    Discharge Environment:  [] Hospice Consult Agency:  [] Inpatient Hospice    [] Home with [address]   [] ECF with Hospice  [] ECF skilled care with Hospice to follow   [] Other:    Discussion: Patient found down at [facility], CPR started by staff 10 min prior to EMS arrival. EMS continues CPR for 20 min with ROSC being obtained. He is now in ICU on vent, pressors, min response to pain and currently difficulty with ventilation. I have called his sister, Tony Green [phone] and left four messages. I did call a friend Virgil Mon [phone] he also tried to call her yesterday and she hung up on him. Krys Connor stated Jason Carrizales has no children or wife, his parents are  and he only has 2 sisters, CARLOS SNYDER and Hari Lewis. I can find no contact info for CARLOS SNYDER and Krys Ryan stated they are estranged. I called the ECF and spoke to , they have no other contact information. 1150 his sister returned call she spoke to Dr Lexie Benson, and agreed to change code status. We have encouraged her to come and see him. I will continue to follow Mr. Jordan Loza care as needed. Thank you for allowing me to participate in the care of Mr. Elsy Coley .      Electronically signed by Sage Young RN, BSN, [facility] on 10/21/2022 at 10:22 AM  Palliative Care Nurse [facility]  Office: [phone]

## 2022-10-22 NOTE — PROGRESS NOTES
Pulmonary Progress Note    Date of Admission: 10/20/2022   LOS: 2 days       CC:  Chief Complaint   Patient presents with    Cardiac Arrest     Pt to ED from [place] [address] post acute care home via [place] [address] [facility] s/p cardiac arrest.  Per EMS report, pt was found down unresponsive in the nursing home with absent pulse. CPR started by nursing home staff 10 min prior to EMS arrival.  EMS then continues CPR for 20 min with ROSC being obtained. Subjective:       ROS:   Unable to assess     Assessment:          Plan: This note may have been transcribed using [id number]. Please disregard any translational errors. Hospital Day: 2            Acute on chronic hypercapnia   Check VBG. Wean O2 to sat >90%   Very significant air trapping     Rigidity    Monitor. May be improving      S/p arrest  Sedation currently off. Titrate to a RASS of -1. Patient not responding to love or pain at this point. With sternal rub, eyes rolled back and head. Need repeat head CT at some point. Given current vent settings, will be concerned about placing him on a portable vent at this time. Given prolonged downtime along with prolonged hypoxemia and hypercapnia, concern for neurologic injury. Emphysema / Vent   Noted on CT at [facility]  Alpha 1 was normal.   Compliance slightly improved. Continue AC PC 14, 20/10. Shoot for tidal volumes of approximately 300 given his height. Decreased inspiratory pressure from 30-20 today. Wean O2 to sat >90%   budesonide 1 gm bid. Duoneb's  q 4   Solumedrol 60 mg q6       Pneumonia  Likely aspiration   Zyvox, Zosyn   Added Eraxis last night after bronchoscopy  Follow cultures. Appears to be slightly improved. Prophylaxis  - GI - pepcid    - DVT -   heparin - eliquis on hold     - VAP - peridex  - C. Diff - Lactobacillus  - Nasal Decolonization - Bactroban    Nutrition  - Diet NPO  Started have bowel movements.   Start trophic tube feeding.  -   Intake/Output Summary (Last 24 hours) at 10/22/2022 0842  Last data filed at 10/22/2022 0800  Gross per 24 hour   Intake 2308.89 ml   Output 2360 ml   Net -51.11 ml         Mobility       Access  Arterial      PICC          CVC       CVC Triple Lumen 10/20/22 Right Femoral (Active)   $ Central line insertion $ Yes 10/20/22 1755   Central Line Being Utilized Yes 10/21/22 0600   Criteria for Appropriate Use Hemodynamically unstable, requiring monitoring lines, vasopressors, or volume resuscitation 10/21/22 0600   Site Assessment Clean, dry & intact 10/21/22 0600   Phlebitis Assessment No symptoms 10/21/22 0600   Infiltration Assessment 0 10/21/22 0600   Color/Movement/Sensation Capillary refill less than 3 sec 10/21/22 0600   Proximal Lumen Color/Status White;Flushed;Capped; Infusing 10/21/22 0600   Medial Lumen Status Blue;Brisk blood return; Infusing 10/21/22 0600   Distal Lumen Color/Status Brown;Normal saline locked; Capped; Infusing 10/21/22 0600   Line Care Connections checked and tightened 10/21/22 0600   Alcohol Cap Used Yes 10/21/22 0600   Date of Last Dressing Change 10/20/22 10/21/22 0600   Dressing Type Transparent; Antimicrobial 10/21/22 0600   Dressing Status Clean, dry & intact 10/21/22 0600   Number of days: 0        Extremely poor prognosis. Will d/c fem line          I spent 35 minutes of critical care time with this patient excluding any procedures. Data:        PHYSICAL EXAM:   Blood pressure (!) 150/97, pulse (!) 115, temperature 98.6 °F (37 °C), temperature source Axillary, resp. rate 15, height 5' 6\" (1.676 m), weight 173 lb 8 oz (78.7 kg), SpO2 99 %.'  Body mass index is 28 kg/m². Gen: No distress. ENT:   Resp: poor breath sounds   CV: Regular rate. Regular rhythm. No murmur or rub. No edema. Skin: Warm, dry, normal texture and turgor. No nodule on exposed extremities. Gi decreased bowel sounds   M/S: No cyanosis. No clubbing. No joint deformity.   Psych: sedated      Medications:    Scheduled Meds: docusate  100 mg Oral Daily    senna  10 mL Oral Nightly    insulin lispro  0-8 Units SubCUTAneous Q4H    polyethylene glycol  17 g Oral Daily    budesonide  1 mg Nebulization BID    lactobacillus  2 capsule Oral BID WC    mupirocin   Nasal BID    anidulafungin  100 mg IntraVENous Q24H    heparin (porcine)  5,000 Units SubCUTAneous 3 times per day    chlorhexidine  15 mL Mouth/Throat BID    famotidine (PEPCID) injection  20 mg IntraVENous BID    sodium chloride flush  5-40 mL IntraVENous 2 times per day    piperacillin-tazobactam  3,375 mg IntraVENous Q8H    linezolid  600 mg IntraVENous Q12H    [Held by provider] apixaban  5 mg Oral BID    [Held by provider] buPROPion  300 mg Oral QAM    montelukast  10 mg Oral Nightly    [Held by provider] pregabalin  50 mg Oral TID    [Held by provider] tamsulosin  0.4 mg Oral Nightly    methylPREDNISolone  60 mg IntraVENous Q6H    ipratropium-albuterol  1 ampule Inhalation Q4H       Continuous Infusions:   phenylephrine (AYDEN-SYNEPHRINE) 50mg/250mL infusion Stopped (10/21/22 1116)    propofol 5 mcg/kg/min (10/22/22 6760)    fentaNYL Stopped (10/20/22 2015)    sodium chloride      dextrose         PRN Meds:  fentaNYL **AND** fentaNYL, sodium chloride flush, sodium chloride, polyethylene glycol, acetaminophen **OR** acetaminophen, glucose, dextrose bolus **OR** dextrose bolus, glucagon (rDNA), dextrose, naloxone    Labs reviewed:  CBC:   Recent Labs     10/20/22  1626 10/21/22  0428 10/22/22  0445   WBC 17.3* 21.9* 19.3*   HGB 9.6* 9.7* 9.2*   HCT 32.3* 33.4* 30.1*   MCV 84.8 85.9 81.5   * 509* 460*       BMP:   Recent Labs     10/20/22  1626 10/21/22  0420 10/22/22  0445    145 141   K 4.9 3.8 3.8   CL 91* 94* 93*   CO2 45* 37* 37*   PHOS  --  2.2* 1.4*   BUN 15 20 21*   CREATININE 0.8* 1.2 0.7*       LIVER PROFILE:   Recent Labs     10/20/22  1626 10/21/22  0420 10/22/22  0445   AST 31 29 27   ALT 30 26 20   BILITOT <0.2 0.3 0.5   ALKPHOS 70 38* 43       PT/INR: No results for input(s): PROTIME, INR in the last 72 hours. APTT: No results for input(s): APTT in the last 72 hours. UA:  Recent Labs     10/20/22  1528   COLORU Yellow   PHUR 5.5   WBCUA 9*   RBCUA 1   BACTERIA 1+*   CLARITYU Clear   SPECGRAV 1.024   LEUKOCYTESUR Negative   UROBILINOGEN 0.2   BILIRUBINUR Negative   BLOODU Negative   GLUCOSEU 100*       No results for input(s): PH, PCO2, PO2 in the last 72 hours. Cx:      Films: This note was transcribed using [address] [employer]. Please disregard any translational errors.       Jennifer Tran Pulmonary, [facility] [phone]

## 2022-10-22 NOTE — PROGRESS NOTES
RT to bedside for ETT exchange due to blown cuff. ETT exchange via bougie attempted by MD. Zenaida Shutter displaced in initial attempt, reintubation via Glidescope initiated. Reintubated by MD on first attempt. ETT secured at 26 cm at lip. Mechanical ventilation resumed.      10/22/22 0202   ETT (adult)   Placement Date/Time: 10/20/22 1500   Airway Tube Size: 7.5 mm  Secured At: 26 cm  Measured From: Lips   $ Intubation Emergent  $ Yes   Secured At 26 cm   Measured From Lips   ETT [address] [facility] [employer] tube enciso   Site Assessment Dry   Cuff Pressure   (MOV)   Tie/Enciso Changed Yes     Electronically signed by Alix Yang RCP on 10/22/2022 at 2:08 AM

## 2022-10-22 NOTE — PROGRESS NOTES
Changing out ET tube due to cuff leak and likely cuff loss, attempted to change ET tube over a bougie stylet, unfortunately couldn't remove tube without losing the footing of the bougie and so lost the airway. Patient was promptly reintubated w/ mahi and etomidate. Patient recovered fine.

## 2022-10-22 NOTE — PROGRESS NOTES
4 Eyes Skin Assessment     NAME:  Km Rees  YOB: 1980  MEDICAL RECORD NUMBER:  [id number]    The patient is being assess for  Shift Handoff    I agree that 2 RN's have performed a thorough Head to Toe Skin Assessment on the patient. ALL assessment sites listed below have been assessed. Areas assessed by both nurses:    Head, Face, Ears, Shoulders, Back, Chest, Arms, Elbows, Hands, Sacrum. Buttock, Coccyx, Ischium, and Legs. Feet and Heels        Does the Patient have a Wound?  No noted wound(s)       Trino Prevention initiated:  Yes   Wound Care Orders initiated:  N/A    Pressure Injury (Stage 3,4, Unstageable, DTI, NWPT, and Complex wounds) if present place referral/consult order under [de-identified] NA    New and Established Ostomies if present place consult order under : NA      Nurse 1 eSignature: Electronically signed by Chloe Mills RN on 10/22/22 at 6:52 PM EDT    **SHARE this note so that the co-signing nurse is able to place an eSignature**    Nurse 2 eSignature: Electronically signed by Geeta Tony on 10/22/22 at 10:50 PM EDT

## 2022-10-22 NOTE — PROGRESS NOTES
Pt vent alarm sounding. RT Lexa Big notified. Pt ETT replaced due to cuff leak. RT and Dr Carlo Tuttle at bedside. 0158 20 etom and 40 mahi given.

## 2022-10-22 NOTE — PROGRESS NOTES
Hospitalist Progress Note      PCP: Fatmata Quiñones DO    Date of Admission: 10/20/2022    Chief Complaint:   Cardiac arrest    Hospital Course:    Shamar Aleman is a 36 y.o. 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 He was adm for unresponsiveness and pulseless. He had vomited too, after which he went into cardiac arrest (no shockable rhythm). CPR lasted about 30 minutes before ROSC; he received 4 doses of epinephrine, 2 A of bicarb. He was intubated on arrival in the ED-complicated by difficulty oxygenation with persistent hypoxemia. Chest x-ray showed right tracheal deviation + widened mediastinum. CT chest revealed right pleural effusion, bibasilar consolidation, patchy infiltrates in bilateral lungs concerning for aspiration.     Subjective:   Still Sedated/ on vent       Medications:  Reviewed    Infusion Medications    phenylephrine (AYDEN-SYNEPHRINE) 50mg/250mL infusion Stopped (10/21/22 1116)    propofol 5 mcg/kg/min (10/22/22 7642)    fentaNYL Stopped (10/20/22 2015)    sodium chloride      dextrose       Scheduled Medications    docusate  100 mg Oral Daily    senna  10 mL Oral Nightly    insulin lispro  0-8 Units SubCUTAneous Q4H    polyethylene glycol  17 g Oral Daily    budesonide  1 mg Nebulization BID    lactobacillus  2 capsule Oral BID WC    mupirocin   Nasal BID    anidulafungin  100 mg IntraVENous Q24H    heparin (porcine)  5,000 Units SubCUTAneous 3 times per day    chlorhexidine  15 mL Mouth/Throat BID    famotidine (PEPCID) injection  20 mg IntraVENous BID    sodium chloride flush  5-40 mL IntraVENous 2 times per day    piperacillin-tazobactam  3,375 mg IntraVENous Q8H    linezolid  600 mg IntraVENous Q12H    [Held by provider] apixaban  5 mg Oral BID    [Held by provider] buPROPion  300 mg Oral QAM    montelukast  10 mg Oral Nightly    [Held by provider] pregabalin  50 mg Oral TID    [Held by provider] tamsulosin  0.4 mg Oral Nightly    methylPREDNISolone  60 mg IntraVENous Q6H    ipratropium-albuterol  1 ampule Inhalation Q4H     PRN Meds: sodium phosphate IVPB **OR** sodium phosphate IVPB, fentaNYL **AND** fentaNYL, sodium chloride flush, sodium chloride, polyethylene glycol, acetaminophen **OR** acetaminophen, glucose, dextrose bolus **OR** dextrose bolus, glucagon (rDNA), dextrose, naloxone      Intake/Output Summary (Last 24 hours) at 10/22/2022 0952  Last data filed at 10/22/2022 0800  Gross per 24 hour   Intake 2259.05 ml   Output 2360 ml   Net -100.95 ml         Physical Exam Performed:    BP (!) 150/97   Pulse (!) 115   Temp 98.6 °F (37 °C) (Axillary)   Resp 15   Ht 5' 6\" (1.676 m)   Wt 173 lb 8 oz (78.7 kg)   SpO2 99%   BMI 28.00 kg/m²     General appearance: No apparent distress, appears stated age and cooperative. HEENT: Pupils equal, round, and reactive to light. Conjunctivae/corneas clear. Neck: Supple, with full range of motion. No jugular venous distention. Trachea midline. Respiratory:  Normal respiratory effort. Clear to auscultation, bilaterally without Rales/Wheezes/Rhonchi. Cardiovascular: Regular rate and rhythm with normal S1/S2 without murmurs, rubs or gallops. Abdomen: Soft, non-tender, non-distended with normal bowel sounds. Musculoskeletal: No clubbing, cyanosis or edema bilaterally. Full range of motion without deformity. Skin: Skin color, texture, turgor normal.  No rashes or lesions. Neurologic:  Neurovascularly intact without any focal sensory/motor deficits.  Cranial nerves: II-XII intact, grossly non-focal.  Psychiatric: Alert and oriented, thought content appropriate, normal insight  Capillary Refill: Brisk, 3 seconds, normal   Peripheral Pulses: +2 palpable, equal bilaterally       Labs:   Recent Labs     10/20/22  1626 10/21/22  0428 10/22/22  0445   WBC 17.3* 21.9* 19.3*   HGB 9.6* 9.7* 9.2*   HCT 32.3* 33.4* 30.1*   * 509* 460*       Recent Labs     10/20/22  1626 10/21/22  0420 10/22/22  0445    145 141   K 4.9 3.8 3.8   CL 91* 94* 93*   CO2 45* 37* 37*   BUN 15 20 21*   CREATININE 0.8* 1.2 0.7*   CALCIUM 9.0 7.8* 8.9   PHOS  --  2.2* 1.4*       Recent Labs     10/20/22  1626 10/21/22  0420 10/22/22  0445   AST 31 29 27   ALT 30 26 20   BILITOT <0.2 0.3 0.5   ALKPHOS 70 38* 43       No results for input(s): INR in the last 72 hours. Recent Labs     10/20/22  1626   CKTOTAL 76   TROPONINI <0.01         Urinalysis:      Lab Results   Component Value Date/Time    NITRU Negative 10/20/2022 03:28 PM    [address] 10/20/2022 03:28 PM    BACTERIA 1+ 10/20/2022 03:28 PM    RBCUA 1 10/20/2022 03:28 PM    BLOODU Negative 10/20/2022 03:28 PM    SPECGRAV 1.024 10/20/2022 03:28 PM    GLUCOSEU 100 10/20/2022 03:28 PM       Radiology:  XR CHEST PORTABLE   Final Result   Endotracheal tube is acceptable with the tip approximately 6 cm above the   riya. NG tube courses into the stomach and off the exam.      Multifocal opacities in the left lung more than the right lung. Some   improved aeration of the right lung but still of lower volume than the left   side. XR CHEST PORTABLE   Final Result   Supportive tubing projects in normal position. No pneumothorax is identified. Bilateral airspace disease appears progressive, hydrostatic pulmonary edema   favored. Right pleural effusion. XR CHEST PORTABLE   Final Result   1. Interval worsening opacification of the left lung. 2. Persistent right lower lung zone opacity. XR ABDOMEN (KUB) (SINGLE AP VIEW)   Final Result   No significant gaseous distention in the abdomen. NG tube tip projects in   region of stomach         CT HEAD WO CONTRAST   Final Result   Examination limited by patient's motion. No acute intracranial abnormalities   noted.   Sinusitis of the left maxillary sinus, right and left sphenoid   sinuses, and bilateral ethmoid air cells. Suggestion of nondisplaced   bilateral nasal bones fracture         CT CERVICAL SPINE WO CONTRAST   Final Result   1. No acute gross fracture. CTA CHEST ABDOMEN PELVIS W CONTRAST   Final Result   1. CTA CHEST: Right pleural effusion with bibasilar consolidation as well as   patchy infiltrates bilateral lungs which may be related to acute infectious   inflammatory process or sequela of aspiration. 2.  Endotracheal tube tip is noted trachea, tip at the level of the aortic   knob. 3. Prominent supradiaphragmatic portion of the inferior vena cava as it   enters the right atrium, 5 cm diameter. 4. CTA ABDOMEN/PELVIS: No acute abnormality. 5.  Cholelithiasis and contracted gallbladder without other findings of acute   cholecystitis. 6. Hepatic steatosis. 7. NG tube courses within the esophagus, tip is at the proximal gastric body. 8. Bilateral adrenal nodular hyperplasia and bilateral adrenal adenomas   (benign finding requiring no additional evaluation or follow-up). XR CHEST PORTABLE   Final Result   Right pleural effusion      Right basilar opacity could represent atelectasis or infection      Cardiomegaly         XR CHEST PORTABLE    (Results Pending)           Assessment:    Cardiorespiratory arrest/not shockable rhythm with ROSC. 2.   Vent dependent hypoxic resp failure complicating #3  3. Acute on Chronic respiratory failure with hypoxia and hypercapnia sec to #4  4. Aspiration/bacterial pneumonia v Pneumonitis  5. Ssepsis secondary to pneumonia  6.   Bilateral adrenal nodular hyperplasia and bilateral adrenal adenomas        Plan  -Appreciate critical care input   -Continue linezolid and Zosyn   -Continue aggressive fluid resuscitation   -Continue with bronchodilators   -Follow-up results of infection screen   -Activate CIWA protocol when sedation is weaned off    Other comorbidities:   Alcohol abuse, chronic

## 2022-10-22 NOTE — PLAN OF CARE
Problem: Pain  Goal: Verbalizes/displays adequate comfort level or baseline comfort level  10/22/2022 0542 by Rashi Moffett RN  Outcome: Progressing  Flowsheets (Taken 10/21/2022 1930)  Verbalizes/displays adequate comfort level or baseline comfort level:   Encourage patient to monitor pain and request assistance   Assess pain using appropriate pain scale     Problem: Safety - Adult  Goal: Free from fall injury  10/22/2022 1553 by Aimee Wylie RN  Outcome: Progressing  10/22/2022 0542 by Rashi Moffett RN  Outcome: Progressing     Problem: ABCDS Injury Assessment  Goal: Absence of physical injury  10/22/2022 1555 by Aimee Wylie RN  Outcome: Progressing  10/22/2022 1553 by Aimee Wylie RN  Outcome: Progressing  10/22/2022 0542 by Rashi Moffett RN  Outcome: Progressing     Problem: Skin/Tissue Integrity  Goal: Absence of new skin breakdown  Description: 1. Monitor for areas of redness and/or skin breakdown  2. Assess vascular access sites hourly  3. Every 4-6 hours minimum:  Change oxygen saturation probe site  4. Every 4-6 hours:  If on nasal continuous positive airway pressure, respiratory therapy assess nares and determine need for appliance change or resting period.   10/22/2022 1553 by Aimee Wylie RN  Outcome: Progressing  10/22/2022 0542 by Rashi Moffett RN  Outcome: Progressing     Problem: Cardiovascular - Adult  Goal: Maintains optimal cardiac output and hemodynamic stability  10/22/2022 1555 by Aimee Wylie RN  Outcome: Progressing  10/22/2022 0542 by Rashi Moffett RN  Outcome: Progressing  Flowsheets (Taken 10/21/2022 1930)  Maintains optimal cardiac output and hemodynamic stability:   Monitor blood pressure and heart rate   Monitor urine output and notify Licensed Independent Practitioner for values outside of normal range  Goal: Absence of cardiac dysrhythmias or at baseline  Outcome: Progressing     Problem: Respiratory - Adult  Goal: Achieves optimal ventilation and oxygenation  10/22/2022 1555 by Salvador Cowan RN  Outcome: Progressing  10/22/2022 0542 by Rhae Meigs, RN  Outcome: Progressing  Flowsheets (Taken 10/21/2022 1930)  Achieves optimal ventilation and oxygenation:   Assess for changes in respiratory status   Assess for changes in mentation and behavior   Position to facilitate oxygenation and minimize respiratory effort     Problem: Gastrointestinal - Adult  Goal: Minimal or absence of nausea and vomiting  10/22/2022 0542 by Rhae Meigs, RN  Outcome: Progressing  Flowsheets (Taken 10/21/2022 1930)  Minimal or absence of nausea and vomiting:   Administer IV fluids as ordered to ensure adequate hydration   Provide nonpharmacologic comfort measures as appropriate  Goal: Maintains adequate nutritional intake  10/22/2022 1555 by Salvador Cowan RN  Outcome: Progressing  10/22/2022 1553 by Salvador Cowan RN  Outcome: Progressing  10/22/2022 0542 by Rhae Meigs, RN  Outcome: Not Progressing  Flowsheets (Taken 10/21/2022 1930)  Maintains adequate nutritional intake: Monitor intake and output, weight and lab values  Goal: Establish and maintain optimal ostomy function  Outcome: Progressing     Problem: Metabolic/Fluid and Electrolytes - Adult  Goal: Electrolytes maintained within normal limits  10/22/2022 1555 by Salvador Cowan RN  Outcome: Progressing  10/22/2022 1553 by Salvador Cowan RN  Outcome: Progressing  Goal: Hemodynamic stability and optimal renal function maintained  10/22/2022 0542 by Rhae Meigs, RN  Outcome: Progressing  Flowsheets (Taken 10/21/2022 1930)  Hemodynamic stability and optimal renal function maintained:   Monitor labs and assess for signs and symptoms of volume excess or deficit   Monitor intake, output and patient weight  Goal: Glucose maintained within prescribed range  10/22/2022 1555 by Salvador Cowan RN  Outcome: Progressing  10/22/2022 1553 by Salvador Cowan RN  Outcome: Progressing  10/22/2022 0542 by Courtney Garcia Twin Mejía RN  Outcome: Progressing  Flowsheets (Taken 10/21/2022 1930)  Glucose maintained within prescribed range:   Monitor blood glucose as ordered   Assess for signs and symptoms of hyperglycemia and hypoglycemia   Administer ordered medications to maintain glucose within target range     Problem: Skin/Tissue Integrity - Adult  Goal: Skin integrity remains intact  10/22/2022 1553 by Jacob Rush RN  Outcome: Progressing  10/22/2022 0542 by Genesis Jones RN  Outcome: Progressing  Flowsheets (Taken 10/21/2022 1930)  Skin Integrity Remains Intact: Monitor for areas of redness and/or skin breakdown  Goal: Incisions, wounds, or drain sites healing without S/S of infection  10/22/2022 1555 by Jacob Rush RN  Outcome: Progressing  10/22/2022 1553 by Jacob Rush RN  Outcome: Progressing  Goal: Oral mucous membranes remain intact  Outcome: Progressing     Problem: Nutrition Deficit:  Goal: Optimize nutritional status  10/22/2022 1555 by Jacob Rush RN  Outcome: Progressing  10/22/2022 0542 by Genesis Jones RN  Outcome: Not Progressing     Problem: Gastrointestinal - Adult  Goal: Maintains adequate nutritional intake  10/22/2022 1555 by Jacob Rush RN  Outcome: Progressing  10/22/2022 1553 by Jacob Rush RN  Outcome: Progressing  10/22/2022 0542 by Genesis Jones RN  Outcome: Not Progressing  Flowsheets (Taken 10/21/2022 1930)  Maintains adequate nutritional intake: Monitor intake and output, weight and lab values     Problem: Nutrition Deficit:  Goal: Optimize nutritional status  10/22/2022 1555 by Jacob Rush RN  Outcome: Progressing  10/22/2022 0542 by Genesis Jones RN  Outcome: Not Progressing

## 2022-10-22 NOTE — PLAN OF CARE
Problem: Nutrition Deficit:  Goal: Optimize nutritional status  10/22/2022 0542 by Adalgisa Schmidt RN  Outcome: Not Progressing     Problem: Gastrointestinal - Adult  Goal: Maintains adequate nutritional intake  10/22/2022 1553 by Sharif Mcclain RN  Outcome: Progressing  10/22/2022 0542 by Adalgisa Schmidt RN  Outcome: Not Progressing  Flowsheets (Taken 10/21/2022 1930)  Maintains adequate nutritional intake: Monitor intake and output, weight and lab values     Problem: Nutrition Deficit:  Goal: Optimize nutritional status  10/22/2022 0542 by Adalgisa Schmidt RN  Outcome: Not Progressing

## 2022-10-22 NOTE — PROGRESS NOTES
4 Eyes Skin Assessment     NAME:  Alejo Gastelum  YOB: 1980  MEDICAL RECORD NUMBER:  [id number]    The patient is being assess for  Shift Handoff    I agree that 2 RN's have performed a thorough Head to Toe Skin Assessment on the patient. ALL assessment sites listed below have been assessed. Areas assessed by both nurses:    Head, Face, Ears, Shoulders, Back, Chest, Arms, Elbows, Hands, Sacrum. Buttock, Coccyx, Ischium, and Legs. Feet and Heels        Does the Patient have a Wound?  No noted wound(s)       Trino Prevention initiated:  Yes   Wound Care Orders initiated:  No    Pressure Injury (Stage 3,4, Unstageable, DTI, NWPT, and Complex wounds) if present place referral/consult order under [de-identified] NA    New and Established Ostomies if present place consult order under : NA      Nurse 1 eSignature: Electronically signed by Carlitos Rico RN on 10/22/22 at 7:18 AM EDT    **SHARE this note so that the co-signing nurse is able to place an eSignature**    Nurse 2 eSignature: Electronically signed by Bisi Schuster RN on 10/22/22 at 11:38 AM EDT

## 2022-10-22 NOTE — PROCEDURES
Intubation Procedure Note    Indication: Respiratory failure    Consent: Unable to be obtained due to the emergent nature of this procedure. Medications Used: etomidate intravenously and rocuronium intravenously    Procedure: The patient was placed in the appropriate position. Cricoid pressure was utilized. Intubation was performed by direct laryngoscopy using a laryngoscope and a 7.5 cuffed endotracheal tube. The cuff was then inflated and the tube was secured appropriately at a distance of 25 cm to the dental ridge. Initial confirmation of placement included bilateral breath sounds, an end tidal CO2 detector, absence of sounds over the stomach, and tube fogging. A chest x-ray to verify correct placement of the tube showed appropriate tube position. The patient tolerated the procedure well.      Complications: None

## 2022-10-23 NOTE — PLAN OF CARE
Problem: Discharge Planning  Goal: Discharge to home or other facility with appropriate resources  Outcome: Not Progressing  Flowsheets (Taken 10/22/2022 2000)  Discharge to home or other facility with appropriate resources:   Identify barriers to discharge with patient and caregiver   Arrange for needed discharge resources and transportation as appropriate   Identify discharge learning needs (meds, wound care, etc)     Problem: Pain  Goal: Verbalizes/displays adequate comfort level or baseline comfort level  Outcome: Progressing  Flowsheets (Taken 10/22/2022 2000)  Verbalizes/displays adequate comfort level or baseline comfort level:   Assess pain using appropriate pain scale   Administer analgesics based on type and severity of pain and evaluate response   Implement non-pharmacological measures as appropriate and evaluate response   Notify Licensed Independent Practitioner if interventions unsuccessful or patient reports new pain     Problem: Safety - Adult  Goal: Free from fall injury  10/22/2022 2249 by Clovis Kay RN  Outcome: Progressing  [address] (Taken 10/22/2022 2000)  Free From Fall Injury: Instruct family/caregiver on patient safety  10/22/2022 1553 by April Cortez RN  Outcome: Progressing     Problem: ABCDS Injury Assessment  Goal: Absence of physical injury  10/22/2022 2249 by Clovis Kay RN  Outcome: Progressing  Flowsheets (Taken 10/22/2022 2000)  Absence of Physical Injury: Implement safety measures based on patient assessment  10/22/2022 1555 by April Cortez RN  Outcome: Progressing  10/22/2022 1553 by April Cortez RN  Outcome: Progressing     Problem: Skin/Tissue Integrity  Goal: Absence of new skin breakdown  Description: 1. Monitor for areas of redness and/or skin breakdown  2. Assess vascular access sites hourly  3. Every 4-6 hours minimum:  Change oxygen saturation probe site  4.   Every 4-6 hours:  If on nasal continuous positive airway pressure, respiratory therapy assess nares and determine need for appliance change or resting period.   10/22/2022 2249 by Mike Bey RN  Outcome: Progressing  10/22/2022 1553 by Eugene Harrington RN  Outcome: Progressing     Problem: Cardiovascular - Adult  Goal: Maintains optimal cardiac output and hemodynamic stability  10/22/2022 2249 by Mike Bey RN  Outcome: Progressing  Flowsheets (Taken 10/22/2022 2000)  Maintains optimal cardiac output and hemodynamic stability:   Monitor blood pressure and heart rate   Monitor urine output and notify Licensed Independent Practitioner for values outside of normal range   Assess for signs of decreased cardiac output   Administer fluid and/or volume expanders as ordered   Administer vasoactive medications as ordered  10/22/2022 1555 by Eugene Harrington RN  Outcome: Progressing  Goal: Absence of cardiac dysrhythmias or at baseline  10/22/2022 2249 by Mike Bey RN  Outcome: Progressing  Flowsheets (Taken 10/22/2022 2000)  Absence of cardiac dysrhythmias or at baseline: Monitor cardiac rate and rhythm  10/22/2022 1555 by Eugene Harrington RN  Outcome: Progressing     Problem: Respiratory - Adult  Goal: Achieves optimal ventilation and oxygenation  10/22/2022 2249 by Mike Bey RN  Outcome: Progressing  Flowsheets (Taken 10/22/2022 2000)  Achieves optimal ventilation and oxygenation:   Assess for changes in respiratory status   Assess for changes in mentation and behavior   Position to facilitate oxygenation and minimize respiratory effort   Oxygen supplementation based on oxygen saturation or arterial blood gases   Respiratory therapy support as indicated  10/22/2022 1555 by Eugene Harrington RN  Outcome: Progressing     Problem: Gastrointestinal - Adult  Goal: Minimal or absence of nausea and vomiting  Outcome: Progressing  Flowsheets (Taken 10/22/2022 2000)  Minimal or absence of nausea and vomiting:   Administer IV fluids as ordered to ensure adequate hydration   Maintain NPO status until nausea and vomiting are resolved   Nasogastric tube to low intermittent suction as ordered   Administer ordered antiemetic medications as needed   Provide nonpharmacologic comfort measures as appropriate   Advance diet as tolerated, if ordered   Nutrition consult to assist patient with adequate nutrition and appropriate food choices  Goal: Maintains or returns to baseline bowel function  Outcome: Progressing  Flowsheets (Taken 10/22/2022 2000)  Maintains or returns to baseline bowel function:   Assess bowel function   Encourage oral fluids to ensure adequate hydration   Administer IV fluids as ordered to ensure adequate hydration   Administer ordered medications as needed   Encourage mobilization and activity   Nutrition consult to assist patient with appropriate food choices  Goal: Maintains adequate nutritional intake  10/22/2022 2249 by Ko Riggs RN  Outcome: Progressing  Flowsheets (Taken 10/22/2022 2000)  Maintains adequate nutritional intake:   Monitor percentage of each meal consumed   Identify factors contributing to decreased intake, treat as appropriate   Assist with meals as needed   Monitor intake and output, weight and lab values   Obtain nutritional consult as needed  10/22/2022 1555 by Jayme Nunes RN  Outcome: Progressing  10/22/2022 1553 by Jayme Nunes RN  Outcome: Progressing  Goal: Establish and maintain optimal ostomy function  10/22/2022 2249 by Ko Riggs RN  Outcome: Progressing  Flowsheets (Taken 10/22/2022 2000)  Establish and maintain optimal ostomy function:   Monitor output from ostomies   Administer IV fluids and TPN as ordered   Introduce and advance enteral feedings as ordered   Infuse IV Fluids/TPN as ordered   Gastric suctioning as ordered   Nutrition consult  10/22/2022 1555 by Jayme Nunes RN  Outcome: Progressing     Problem: Metabolic/Fluid and Electrolytes - Adult  Goal: Electrolytes maintained within normal limits  10/22/2022 2249 by Bibi Ortega RN  Outcome: Progressing  Flowsheets (Taken 10/22/2022 2000)  Electrolytes maintained within normal limits:   Monitor labs and assess patient for signs and symptoms of electrolyte imbalances   Administer electrolyte replacement as ordered   Monitor response to electrolyte replacements, including repeat lab results as appropriate   Fluid restriction as ordered   Instruct patient on fluid and nutrition restrictions as appropriate  10/22/2022 1555 by Nicola Gilliland RN  Outcome: Progressing  10/22/2022 1553 by Nicola Gilliland RN  Outcome: Progressing  Goal: Hemodynamic stability and optimal renal function maintained  Outcome: Progressing  Flowsheets (Taken 10/22/2022 2000)  Hemodynamic stability and optimal renal function maintained:   Monitor labs and assess for signs and symptoms of volume excess or deficit   Monitor intake, output and patient weight   Monitor urine specific gravity, serum osmolarity and serum sodium as indicated or ordered   Monitor response to interventions for patient's volume status, including labs, urine output, blood pressure (other measures as available)  Goal: Glucose maintained within prescribed range  10/22/2022 2249 by Bibi Ortega RN  Outcome: Progressing  Flowsheets (Taken 10/22/2022 2000)  Glucose maintained within prescribed range:   Monitor blood glucose as ordered   Administer ordered medications to maintain glucose within target range   Assess for signs and symptoms of hyperglycemia and hypoglycemia   Assess barriers to adequate nutritional intake and initiate nutrition consult as needed  10/22/2022 1555 by Nicola Gilliland RN  Outcome: Progressing  10/22/2022 1553 by Nicola Gilliland RN  Outcome: Progressing     Problem: Skin/Tissue Integrity - Adult  Goal: Skin integrity remains intact  10/22/2022 2249 by Bibi Ortega RN  Outcome: Progressing  Flowsheets (Taken 10/22/2022 2000)  Skin Integrity Remains Intact:   Monitor for areas of redness and/or skin breakdown   Assess vascular access sites hourly   Every 4-6 hours minimum: Change oxygen saturation probe site  10/22/2022 1553 by Lilo Schulz RN  Outcome: Progressing  Goal: Incisions, wounds, or drain sites healing without S/S of infection  10/22/2022 2249 by Azul Rios RN  Outcome: Progressing  Flowsheets (Taken 10/22/2022 2000)  Incisions, Wounds, or Drain Sites Healing Without Sign and Symptoms of Infection: Initiate pressure ulcer prevention bundle as indicated  10/22/2022 1555 by Lilo Schulz RN  Outcome: Progressing  10/22/2022 1553 by Lilo Schulz RN  Outcome: Progressing  Goal: Oral mucous membranes remain intact  10/22/2022 2249 by Azul Rios RN  Outcome: Progressing  Flowsheets (Taken 10/22/2022 2000)  Oral Mucous Membranes Remain Intact: Assess oral mucosa and hygiene practices  10/22/2022 1553 by Lilo Schulz RN  Outcome: Progressing     Problem: Nutrition Deficit:  Goal: Optimize nutritional status  10/22/2022 2249 by Azul Rios RN  Outcome: Progressing  10/22/2022 1555 by Lilo Schulz RN  Outcome: Progressing     Problem: Discharge Planning  Goal: Discharge to home or other facility with appropriate resources  Outcome: Not Progressing  Flowsheets (Taken 10/22/2022 2000)  Discharge to home or other facility with appropriate resources:   Identify barriers to discharge with patient and caregiver   Arrange for needed discharge resources and transportation as appropriate   Identify discharge learning needs (meds, wound care, etc)

## 2022-10-23 NOTE — PROGRESS NOTES
4 Eyes Skin Assessment     NAME:  Ozzie Moreno  YOB: 1980  MEDICAL RECORD NUMBER:  [id number]    The patient is being assess for  Shift Handoff    I agree that 2 RN's have performed a thorough Head to Toe Skin Assessment on the patient. ALL assessment sites listed below have been assessed. Areas assessed by both nurses:    Head, Face, Ears, Shoulders, Back, Chest, Arms, Elbows, Hands, Sacrum. Buttock, Coccyx, Ischium, and Legs. Feet and Heels        Does the Patient have a Wound?  No noted wound(s)       Trino Prevention initiated:  Yes   Wound Care Orders initiated:  NA    Pressure Injury (Stage 3,4, Unstageable, DTI, NWPT, and Complex wounds) if present place referral/consult order under [de-identified] NA    New and Established Ostomies if present place consult order under : NA      Nurse 1 eSignature: Electronically signed by Rickie Bermeo RN on 10/23/22 at 6:31 PM EDT    **SHARE this note so that the co-signing nurse is able to place an eSignature**    Nurse 2 eSignature: Electronically signed by Sandra Dia on 10/24/22 at 2:50 AM EDT

## 2022-10-23 NOTE — PROGRESS NOTES
Pulmonary Progress Note    Date of Admission: 10/20/2022   LOS: 3 days       CC:  Chief Complaint   Patient presents with    Cardiac Arrest     Pt to ED from [place] [address] [facility] acute long-term via [place] [address] [facility] s/p cardiac arrest.  Per EMS report, pt was found down unresponsive in the nursing home with absent pulse. CPR started by nursing home staff 10 min prior to EMS arrival.  EMS then continues CPR for 20 min with ROSC being obtained. Subjective:       ROS:   Unable to assess     Assessment:          Plan: This note may have been transcribed using [id number]. Please disregard any translational errors. Hospital Day: 3            Acute on chronic hypercapnia  Ventilator changes. Changed to VC plus. ABG 1 hour    Rigidity    Monitor. May be improving      S/p arrest  Continues to be off sedation. Continues to have poor prognosis with lack of positive neurologic responses. CT head today. Consider MRI Monday. Order EEG. Emphysema / Vent   Noted on CT at [facility]  Alpha 1 was normal.   Compliance slightly improved. Continue AC PC 14, 20/10. Shoot for tidal volumes of approximately 300 given his height. Decreased inspiratory pressure from 30-20 today. Wean O2 to sat >90%   budesonide 1 gm bid. Duoneb's  q 4   Solumedrol 60 mg q6 , decreased to 4212. Pneumonia  Likely aspiration   Zyvox, Zosyn   Eraxis  Follow cultures. Appears to be slightly improved. Prophylaxis  - GI - pepcid    - DVT -   heparin - eliquis on hold     - VAP - peridex  - C. Diff - Lactobacillus  - Nasal Decolonization - Bactroban    Nutrition  - Diet NPO  ADULT TUBE FEEDING; Nasogastric; Standard with Fiber; Continuous; 20; No; 30; Q 1 hour  Started have bowel movements.   Start trophic tube feeding.  -   Intake/Output Summary (Last 24 hours) at 10/23/2022 0902  Last data filed at 10/23/2022 1690  Gross per 24 hour   Intake 2486.15 ml   Output 2183 ml   Net 303.15 ml         Mobility Access  Arterial      PICC          CVC       CVC Triple Lumen 10/20/22 Right Femoral (Active)   $ Central line insertion $ Yes 10/20/22 1755   Central Line Being Utilized Yes 10/21/22 0600   Criteria for Appropriate Use Hemodynamically unstable, requiring monitoring lines, vasopressors, or volume resuscitation 10/21/22 0600   Site Assessment Clean, dry & intact 10/21/22 0600   Phlebitis Assessment No symptoms 10/21/22 0600   Infiltration Assessment 0 10/21/22 0600   Color/Movement/Sensation Capillary refill less than 3 sec 10/21/22 0600   Proximal Lumen Color/Status White;Flushed;Capped; Infusing 10/21/22 0600   Medial Lumen Status Blue;Brisk blood return; Infusing 10/21/22 0600   Distal Lumen Color/Status Brown;Normal saline locked; Capped; Infusing 10/21/22 0600   Line Care Connections checked and tightened 10/21/22 0600   Alcohol Cap Used Yes 10/21/22 0600   Date of Last Dressing Change 10/20/22 10/21/22 0600   Dressing Type Transparent; Antimicrobial 10/21/22 0600   Dressing Status Clean, dry & intact 10/21/22 0600   Number of days: 0        Extremely poor prognosis. Will d/c fem line          I spent 35 minutes of critical care time with this patient excluding any procedures. Data:        PHYSICAL EXAM:   Blood pressure (!) 171/87, pulse (!) 115, temperature 100.2 °F (37.9 °C), temperature source Axillary, resp. rate 14, height 5' 6\" (1.676 m), weight 164 lb 7.4 oz (74.6 kg), SpO2 95 %.'  Body mass index is 26.55 kg/m². Gen: No distress. ENT:   Resp: poor breath sounds   CV: Regular rate. Regular rhythm. No murmur or rub. No edema. Skin: Warm, dry, normal texture and turgor. No nodule on exposed extremities. Gi decreased bowel sounds   M/S: No cyanosis. No clubbing. No joint deformity. Psych: Poor response to pain. Eyes open with eyes deviated to up and left. Dilated. Responsive to light.       Medications:    Scheduled Meds:   docusate  100 mg Oral Daily    senna  10 mL Oral Nightly insulin lispro  0-8 Units SubCUTAneous Q4H    polyethylene glycol  17 g Oral Daily    budesonide  1 mg Nebulization BID    lactobacillus  2 capsule Oral BID WC    mupirocin   Nasal BID    anidulafungin  100 mg IntraVENous Q24H    heparin (porcine)  5,000 Units SubCUTAneous 3 times per day    chlorhexidine  15 mL Mouth/Throat BID    famotidine (PEPCID) injection  20 mg IntraVENous BID    sodium chloride flush  5-40 mL IntraVENous 2 times per day    piperacillin-tazobactam  3,375 mg IntraVENous Q8H    linezolid  600 mg IntraVENous Q12H    [Held by provider] apixaban  5 mg Oral BID    [Held by provider] buPROPion  300 mg Oral QAM    montelukast  10 mg Oral Nightly    [Held by provider] pregabalin  50 mg Oral TID    [Held by provider] tamsulosin  0.4 mg Oral Nightly    methylPREDNISolone  60 mg IntraVENous Q6H    ipratropium-albuterol  1 ampule Inhalation Q4H       Continuous Infusions:   phenylephrine (AYDEN-SYNEPHRINE) 50mg/250mL infusion Stopped (10/21/22 1116)    propofol Stopped (10/22/22 0716)    fentaNYL Stopped (10/20/22 2015)    sodium chloride      dextrose         PRN Meds:  sodium phosphate IVPB **OR** sodium phosphate IVPB, fentaNYL **AND** fentaNYL, sodium chloride flush, sodium chloride, polyethylene glycol, acetaminophen **OR** acetaminophen, glucose, dextrose bolus **OR** dextrose bolus, glucagon (rDNA), dextrose, naloxone    Labs reviewed:  CBC:   Recent Labs     10/21/22  0428 10/22/22  0445 10/23/22  0427   WBC 21.9* 19.3* 15.7*   HGB 9.7* 9.2* 9.1*   HCT 33.4* 30.1* 29.9*   MCV 85.9 81.5 80.8   * 460* 507*       BMP:   Recent Labs     10/21/22  0420 10/22/22  0445 10/23/22  0427    141 140   K 3.8 3.8 3.5   CL 94* 93* 96*   CO2 37* 37* 34*   PHOS 2.2* 1.4* 2.9   BUN 20 21* 22*   CREATININE 1.2 0.7* 0.6*       LIVER PROFILE:   Recent Labs     10/21/22  0420 10/22/22  0445 10/23/22  0427   AST 29 27 33   ALT 26 20 17   BILITOT 0.3 0.5 0.4   ALKPHOS 38* 43 45       PT/INR: No results for input(s): PROTIME, INR in the last 72 hours. APTT: No results for input(s): APTT in the last 72 hours. UA:  Recent Labs     10/20/22  1528   COLORU Yellow   PHUR 5.5   WBCUA 9*   RBCUA 1   BACTERIA 1+*   CLARITYU Clear   SPECGRAV 1.024   LEUKOCYTESUR Negative   UROBILINOGEN 0.2   BILIRUBINUR Negative   BLOODU Negative   GLUCOSEU 100*       No results for input(s): PH, PCO2, PO2 in the last 72 hours. Cx:      Films: This note was transcribed using [id number] [employer]. Please disregard any translational errors.       Jennifer Tran Pul[facility], [phone]

## 2022-10-23 NOTE — PROGRESS NOTES
Pt went for CT at 1600 today. Pt began to cough and vomit around ETT. Output appeared tan and like tube feed. When transported back to room, placed on suction. Not much output in suction container after placing on suction. Per Teresa Schilling, advanced OG tube 10 cm.

## 2022-10-23 NOTE — PROGRESS NOTES
Hospitalist Progress Note    Patient:  Omer Serna  Unit/Bed:X1V-2402/2108-01   YOB: 1980       MRN: [id number] Acct: [de-identified]  PCP: Marcelo Andrade DO    Date of Admission: 10/20/2022  --------------------------    Chief Complaint:     Cardiac arrest    Hospital Course:     Omer Serna is a 36 y.o. male hospitalized on 10/20/2022   after cardiac arrest.  Medical history significant for COPD, chronic hypoxic respiratory failure, history of alcohol use and pancreatitis. He is anticoagulated with Eliquis. He was found unresponsive and pulseless surrounded by vomiting. CPR was performed for prior at least 20 before ROSC achieved. No shockable rhythm. Assessment/plan:     Cardiac arrest status post ROSC  Acute hypoxic/hypercapnic respiratory failure  COPD with exacerbation  Aspiration pneumonia       Neuro:  -No meaningful neuro recovery noted, repeated CT scan ordered by CT. Currently off sedation    Pulm:  -Continue vent support, currently on pressure cycle ventilation, noted the need for repeated ABG in the light of the vent adjustment. No pH noted  -Continue respiratory care protocol per pulmonary team  -Continue antibiotics for aspiration pneumonia  -CT scan of the chest ordered by ICU team    CVS:  Status post cardiac arrest, normal EF on echocardiogram.    -restart metoprolol for hypertension    Renal:  Monitor renal function, replete electrolyte as required    GI:  GI prophylaxis, enteral feeding    ID:  Stenotrophomonas maltophilia in the sputum culture  Continue antibiotics for aspiration pneumonia, culture data reviewed  -On Eraxis      Endocrine:  Monitor blood sugar    Hem/Onc:  Monitoring CBC  Anticoagulated with Eliquis for previous history of thromboembolism.      Skin: Continue skin care   Line: Reviewed            Code Status: Limited         DVT prophylaxis: On Eliquis     Disposition: Continue ICU care, prognosis remain poor         Discussed with RN      Discussed with ICU team    No family at bedside    ----------------      Subjective:     Patient seen and examined  Overnight events noted  RN and ancillary staff note reviewed    Discussed with nursing staff  Unresponsive despite being off sedation  Culture data noted. Improved ABG noted      Diet: Diet NPO  ADULT TUBE FEEDING; Nasogastric; Standard with Fiber; Continuous; 20; No; 30; Q 1 hour    OBJECTIVE     Exam:  BP (!) 163/90   Pulse (!) 113   Temp 98.6 °F (37 °C) (Axillary)   Resp 21   Ht 5' 6\" (1.676 m)   Wt 164 lb 7.4 oz (74.6 kg)   SpO2 94%   BMI 26.55 kg/m²          Gen: sedated  Head: Normocephalic. Atraumatic. ENT: ETT and tube feeding   in place, feeding tube in place, no oral ulceration. CVS: Nml S1S2, no murmur  RRR  Pulmomary: Reduce air entry anteriorl on the vent  Gastrointestinal: Soft, nondistended, no grimacing, . Musculoskeletal: No edema.  Warm  Neuro: Sedated, unable to assess  Psychiatry:  unable to assess             Medications:  Reviewed    Infusion Medications    phenylephrine (AYDEN-SYNEPHRINE) 50mg/250mL infusion Stopped (10/21/22 1116)    propofol Stopped (10/22/22 0716)    fentaNYL Stopped (10/20/22 2015)    sodium chloride      dextrose       Scheduled Medications    methylPREDNISolone  40 mg IntraVENous Q12H    sulfamethoxazole-trimethoprim (BACTRIM) IVPB  380 mg IntraVENous Q8H    docusate  100 mg Oral Daily    senna  10 mL Oral Nightly    insulin lispro  0-8 Units SubCUTAneous Q4H    polyethylene glycol  17 g Oral Daily    budesonide  1 mg Nebulization BID    lactobacillus  2 capsule Oral BID WC    mupirocin   Nasal BID    anidulafungin  100 mg IntraVENous Q24H    chlorhexidine  15 mL Mouth/Throat BID    famotidine (PEPCID) injection  20 mg IntraVENous BID    sodium chloride flush  5-40 mL IntraVENous 2 times per day    apixaban  5 mg Oral BID    [Held by provider] buPROPion  300 mg Oral QAM    montelukast  10 mg Oral Nightly    [Held by provider] pregabalin  50 mg Oral TID    [facility] [place] by provider] tamsulosin  0.4 mg Oral Nightly    ipratropium-albuterol  1 ampule Inhalation Q4H     PRN Meds: sodium phosphate IVPB **OR** sodium phosphate IVPB, fentaNYL **AND** fentaNYL, sodium chloride flush, sodium chloride, polyethylene glycol, acetaminophen **OR** acetaminophen, glucose, dextrose bolus **OR** dextrose bolus, glucagon (rDNA), dextrose, naloxone      Intake/Output Summary (Last 24 hours) at 10/23/2022 1308  Last data filed at 10/23/2022 4080  Gross per 24 hour   Intake 2234.14 ml   Output 2233 ml   Net 1.14 ml             Labs:   Recent Labs     10/21/22  0428 10/22/22  0445 10/23/22  0427   WBC 21.9* 19.3* 15.7*   HGB 9.7* 9.2* 9.1*   HCT 33.4* 30.1* 29.9*   * 460* 507*     Recent Labs     10/21/22  0420 10/22/22  0445 10/23/22  0427    141 140   K 3.8 3.8 3.5   CL 94* 93* 96*   CO2 37* 37* 34*   BUN 20 21* 22*   CREATININE 1.2 0.7* 0.6*   CALCIUM 7.8* 8.9 9.0   PHOS 2.2* 1.4* 2.9     Recent Labs     10/21/22  0420 10/22/22  0445 10/23/22  0427   AST 29 27 33   ALT 26 20 17   BILITOT 0.3 0.5 0.4   ALKPHOS 38* 43 45     No results for input(s): INR in the last 72 hours. Recent Labs     10/20/22  1626   CKTOTAL 76   TROPONINI <0.01       Urinalysis:      Lab Results   Component Value Date/Time    NITRU Negative 10/20/2022 03:28 PM    [address] 10/20/2022 03:28 PM    BACTERIA 1+ 10/20/2022 03:28 PM    RBCUA 1 10/20/2022 03:28 PM    BLOODU Negative 10/20/2022 03:28 PM    SPECGRAV 1.024 10/20/2022 03:28 PM    GLUCOSEU 100 10/20/2022 03:28 PM       Radiology:  XR CHEST PORTABLE   Final Result   Supportive tubing projects in normal position. Perihilar indistinctness, most likely pulmonary edema, versus pneumonia. Persistent right basilar pleural effusion. XR CHEST PORTABLE   Final Result   Endotracheal tube is acceptable with the tip approximately 6 cm above the   riya.   NG tube courses into the stomach and off the exam.      Multifocal opacities in the left lung more than the right lung. Some   improved aeration of the right lung but still of lower volume than the left   side. XR CHEST PORTABLE   Final Result   Supportive tubing projects in normal position. No pneumothorax is identified. Bilateral airspace disease appears progressive, hydrostatic pulmonary edema   favored. Right pleural effusion. XR CHEST PORTABLE   Final Result   1. Interval worsening opacification of the left lung. 2. Persistent right lower lung zone opacity. XR ABDOMEN (KUB) (SINGLE AP VIEW)   Final Result   No significant gaseous distention in the abdomen. NG tube tip projects in   region of stomach         CT HEAD WO CONTRAST   Final Result   Examination limited by patient's motion. No acute intracranial abnormalities   noted. Sinusitis of the left maxillary sinus, right and left sphenoid   sinuses, and bilateral ethmoid air cells. Suggestion of nondisplaced   bilateral nasal bones fracture         CT CERVICAL SPINE WO CONTRAST   Final Result   1. No acute gross fracture. CTA CHEST ABDOMEN PELVIS W CONTRAST   Final Result   1. CTA CHEST: Right pleural effusion with bibasilar consolidation as well as   patchy infiltrates bilateral lungs which may be related to acute infectious   inflammatory process or sequela of aspiration. 2.  Endotracheal tube tip is noted trachea, tip at the level of the aortic   knob. 3. Prominent supradiaphragmatic portion of the inferior vena cava as it   enters the right atrium, 5 cm diameter. 4. CTA ABDOMEN/PELVIS: No acute abnormality. 5.  Cholelithiasis and contracted gallbladder without other findings of acute   cholecystitis. 6. Hepatic steatosis. 7. NG tube courses within the esophagus, tip is at the proximal gastric body. 8. Bilateral adrenal nodular hyperplasia and bilateral adrenal adenomas   (benign finding requiring no additional evaluation or follow-up).          XR CHEST PORTABLE   Final Result   Right pleural effusion      Right basilar opacity could represent atelectasis or infection      Cardiomegaly         CT HEAD WO CONTRAST    (Results Pending)   CT CHEST WO CONTRAST    (Results Pending)               Electronically signed by Mirela Ceja MD on 10/23/2022 at 1:08 PM

## 2022-10-23 NOTE — PROGRESS NOTES
4 Eyes Skin Assessment     NAME:  Tasha Precise  YOB: 1980  MEDICAL RECORD NUMBER:  [id number]    The patient is being assess for  Shift Handoff    I agree that 2 RN's have performed a thorough Head to Toe Skin Assessment on the patient. ALL assessment sites listed below have been assessed. Areas assessed by both nurses:    Head, Face, Ears, Shoulders, Back, Chest, Arms, Elbows, Hands, Sacrum. Buttock, Coccyx, Ischium, and Legs. Feet and Heels        Does the Patient have a Wound?  No noted wound(s)       Trino Prevention initiated:  Yes   Wound Care Orders initiated:  No    Pressure Injury (Stage 3,4, Unstageable, DTI, NWPT, and Complex wounds) if present place referral/consult order under [de-identified] No    New and Established Ostomies if present place consult order under : NA      Nurse 1 eSignature: Electronically signed by Maryam Malone on 10/23/22 at 6:55 AM EDT    **SHARE this note so that the co-signing nurse is able to place an eSignature**    Nurse 2 eSignature: Electronically signed by Yves Hallman RN on 10/23/22 at 4:57 PM EDT

## 2022-10-24 PROBLEM — J15.6 GRAM-NEGATIVE PNEUMONIA (HCC): Status: ACTIVE | Noted: 2022-01-01

## 2022-10-24 PROBLEM — J96.11 CHRONIC RESPIRATORY FAILURE WITH HYPOXIA AND HYPERCAPNIA (HCC): Status: ACTIVE | Noted: 2022-01-01

## 2022-10-24 PROBLEM — J96.12 CHRONIC RESPIRATORY FAILURE WITH HYPOXIA AND HYPERCAPNIA (HCC): Status: ACTIVE | Noted: 2022-01-01

## 2022-10-24 PROBLEM — G93.1 ANOXIC ENCEPHALOPATHY (HCC): Status: ACTIVE | Noted: 2022-01-01

## 2022-10-24 NOTE — PROCEDURES
[facility]           [address]                          ELECTROENCEPHALOGRAM REPORT    PATIENT NAME: Lane Luna                         :        1980  MED REC NO:   [id number]                          ROOM:       2108  ACCOUNT NO:   [de-identified]                           ADMIT DATE: 10/20/2022  PROVIDER:     Doron Whiteside DO    DATE OF EEG:  10/24/2022    REFERRING PROVIDER:  Spencer Barber MD    REASON FOR STUDY:  Status post arrest, poor mental status. BRIEF HISTORY AND NEUROLOGIC FINDINGS:  The patient is a 56-year-old  male being evaluated for altered mental status following cardiopulmonary  arrest.    MEDICATIONS:  The patient's central acting medications listed include  Narcan, propofol, and fentanyl. EEG FINDINGS:  This is a 20-channel digital EEG performed utilizing  bipolar and referential montages. The patient was essentially  unresponsive throughout the recording. During maximal arousal, there  was a low to moderate voltage, relatively symmetric, disorganized though  reactive 5-6 Hz theta rhythm. No discernible dominant posterior  background was present. Superimposed slower theta and delta frequencies  were noted throughout the recording. Significant myogenic artifact and  movement artifact was noted at times during the recording. There was  also electrical artifact. Ventilator artifact was also present. There  was some superimposed bihemispheric suppression. Photic stimulation was performed at various flash frequencies and did  not evoke any significant posterior driving response. Hyperventilation  exercise was not performed due to the patient's clinical history. A 1-channel EKG rhythm strip was reviewed and showed no obvious cardiac  dysrhythmias.     EEG DIAGNOSIS:  Abnormal EEG secondary to moderately severe background  slowing and disorganization along with some superimposed bihemispheric  suppression. CLINICAL INTERPRETATION:  The background slowing and disorganization is  nonspecific and consistent with a moderately severe encephalopathy. This may be seen in multiple settings including toxic, metabolic, or  degenerative conditions as well as with medication effect and postictal  states. No definite epileptiform activity was present during this  recording. The bihemispheric suppression is also nonspecific and  consistent with the patient's moderately severe encephalopathy. Again,  medication effect could be contributing as could multiple other  conditions. If seizures remain a clinical concern, then a repeat EEG  may be of further benefit. Clinical correlation is advised.         Basia Wong DO    D: 10/24/2022 14:52:10       T: 10/24/2022 14:54:37     [email or web address]  Job#: [id number]     Doc#: [id number]    CC:

## 2022-10-24 NOTE — CARE COORDINATION
Chart Reviewed. Goal:  Return to [address] bed. Rep:  Raquel Aragon [phone]. Limited Code status. Following for DC needs.   [facility]     Case Management   [phone]    10/24/2022  10:24 AM

## 2022-10-24 NOTE — PROGRESS NOTES
Pulmonary Progress Note    CC:  Follow up respiratory failure, cardiac arrest    Subjective:  Remains on vent with FIO2 0f 80%  CT Brain suggest some edema and anoxic injury   Off continuous sedation  High residuals with high NG output         Intake/Output Summary (Last 24 hours) at 10/24/2022 0825  Last data filed at 10/24/2022 0620  Gross per 24 hour   Intake 1933.87 ml   Output 2765 ml   Net -831.13 ml         PHYSICAL EXAM:  Blood pressure (!) 161/109, pulse (!) 116, temperature 98.9 °F (37.2 °C), temperature source Axillary, resp. rate 26, height 5' 6\" (1.676 m), weight 165 lb 12.6 oz (75.2 kg), SpO2 100 %.'  Gen: Chronically ill, comatose   Eyes: Pupils dilated with slow nystagmus   ENT: No discharge. Pharynx with ETT and NG  Neck: Trachea midline. No obvious mass. Resp: Diffuse wheezing   CV: Tachy No murmur or rub. GI: Slight distention   Skin: Warm, dry, normal texture and turgor. No nodule on exposed extremities. Lymph: No cervical LAD. No supraclavicular LAD. M/S: No cyanosis. No clubbing. No joint deformity.     Neuro: Comatose, myoclonus   Ext:   trace edema    Medications:    Scheduled Meds:   methylPREDNISolone  40 mg IntraVENous Q12H    sulfamethoxazole-trimethoprim (BACTRIM) IVPB  380 mg IntraVENous Q8H    metoprolol tartrate  25 mg Oral BID    docusate  100 mg Oral Daily    senna  10 mL Oral Nightly    insulin lispro  0-8 Units SubCUTAneous Q4H    polyethylene glycol  17 g Oral Daily    budesonide  1 mg Nebulization BID    lactobacillus  2 capsule Oral BID WC    mupirocin   Nasal BID    anidulafungin  100 mg IntraVENous Q24H    chlorhexidine  15 mL Mouth/Throat BID    famotidine (PEPCID) injection  20 mg IntraVENous BID    sodium chloride flush  5-40 mL IntraVENous 2 times per day    apixaban  5 mg Oral BID    [Held by provider] buPROPion  300 mg Oral QAM    montelukast  10 mg Oral Nightly    [Held by provider] pregabalin  50 mg Oral TID    [Held by provider] tamsulosin  0.4 mg Oral Nightly    ipratropium-albuterol  1 ampule Inhalation Q4H       Continuous Infusions:   phenylephrine (AYDEN-SYNEPHRINE) 50mg/250mL infusion Stopped (10/21/22 1116)    propofol Stopped (10/22/22 0716)    fentaNYL Stopped (10/20/22 2015)    sodium chloride      dextrose         PRN Meds:  hydrALAZINE, sodium phosphate IVPB **OR** sodium phosphate IVPB, fentaNYL **AND** fentaNYL, sodium chloride flush, sodium chloride, polyethylene glycol, acetaminophen **OR** acetaminophen, glucose, dextrose bolus **OR** dextrose bolus, glucagon (rDNA), dextrose, naloxone    Labs:  CBC:   Recent Labs     10/22/22  0445 10/23/22  0427 10/24/22  0424   WBC 19.3* 15.7* 17.0*   HGB 9.2* 9.1* 9.3*   HCT 30.1* 29.9* 30.2*   MCV 81.5 80.8 81.2   * 507* 528*     BMP:   Recent Labs     10/22/22  0445 10/23/22  0427 10/24/22  0424    140 137   K 3.8 3.5 4.2   CL 93* 96* 97*   CO2 37* 34* 33*   PHOS 1.4* 2.9 3.8   BUN 21* 22* 21*   CREATININE 0.7* 0.6* 0.6*     LIVER PROFILE:   Recent Labs     10/22/22  0445 10/23/22  0427 10/24/22  0424   AST 27 33 34   ALT 20 17 17   BILITOT 0.5 0.4 0.3   ALKPHOS 43 45 45     PT/INR: No results for input(s): PROTIME, INR in the last 72 hours. APTT: No results for input(s): APTT in the last 72 hours. UA:No results for input(s): NITRITE, COLORU, PHUR, LABCAST, WBCUA, RBCUA, MUCUS, TRICHOMONAS, YEAST, BACTERIA, CLARITYU, SPECGRAV, LEUKOCYTESUR, UROBILINOGEN, BILIRUBINUR, BLOODU, GLUCOSEU, AMORPHOUS in the last 72 hours. Invalid input(s): Kentrell Rein  No results for input(s): PH, PCO2, PO2 in the last 72 hours. Films:  Chest imaging reports were reviewed and imaging was reviewed by me and showed ETT in placed, gastric tube course off the film (unable to see termination point).   Bilateral airspace disease with bilateral effusions     AB.    Cultures:  BAL:  stenotrophomonas     I reviewed the labs and images listed above    Assessment/Plan:   Cardiopulmonary Arrest, PEA at nursing home   Continue with full vent support  Continue to hold continuous sedation  Daily ABG  Titrate oxygen for saturations greater than or equal to 90%  Chronic Hypoxic/Hypercapnic Respiratory Failure  Full vent support  Daily ABG  Gram negative pneumonia due to stenotrophomonas   Bactrim for 7 days  Dc Eraxis   Check procal  Lactobacillus   Anoxic Encephalopathy   EEG today   COPD   Pulmicort q 12   Duonebs q 4 hours  Solumedrol   Bilateral pleural effusions  Start Lasix 20 mg IV BID   Myoclonus   EEG today   Ileus  Enteral feeding on hold  NG to suction     Prognosis is poor for any meaningful neurological recovery     GI prophylaxis  Pepcid  DVT prophylaxis  Eliquis     Critical care time of 32 minutes. This does not include procedural time. Please see procedural notes for details.     Ami Wood, DO  [facility]

## 2022-10-24 NOTE — PROGRESS NOTES
4 Eyes Skin Assessment     NAME:  Ignacio Pinto  YOB: 1980  MEDICAL RECORD NUMBER:  [id number]    The patient is being assess for  Shift Handoff    I agree that 2 RN's have performed a thorough Head to Toe Skin Assessment on the patient. ALL assessment sites listed below have been assessed. Areas assessed by both nurses:    Head, Face, Ears, Shoulders, Back, Chest, Arms, Elbows, Hands, Sacrum. Buttock, Coccyx, Ischium, and Legs. Feet and Heels        Does the Patient have a Wound?  No noted wound(s)       Trino Prevention initiated:  Yes   Wound Care Orders initiated:  No    Pressure Injury (Stage 3,4, Unstageable, DTI, NWPT, and Complex wounds) if present place referral/consult order under [de-identified] NA    New and Established Ostomies if present place consult order under : NA      Nurse 1 eSignature: Electronically signed by Melanie Porter RN on 10/24/22 at 7:04 PM EDT    **SHARE this note so that the co-signing nurse is able to place an eSignature**    Nurse 2 eSignature: Electronically signed by Abel Morales RN on 10/24/22 at 11:13 PM EDT

## 2022-10-24 NOTE — PLAN OF CARE
Problem: Nutrition Deficit:  Goal: Optimize nutritional status  10/24/2022 1057 by Carissa Ibarra RD, BRET  Outcome: Not Progressing     Problem: Safety - Adult  Goal: Free from fall injury  Outcome: Progressing     Problem: ABCDS Injury Assessment  Goal: Absence of physical injury  Outcome: Progressing     Problem: Skin/Tissue Integrity  Goal: Absence of new skin breakdown  Description: 1. Monitor for areas of redness and/or skin breakdown  2. Assess vascular access sites hourly  3. Every 4-6 hours minimum:  Change oxygen saturation probe site  4. Every 4-6 hours:  If on nasal continuous positive airway pressure, respiratory therapy assess nares and determine need for appliance change or resting period.   Outcome: Progressing     Problem: Metabolic/Fluid and Electrolytes - Adult  Goal: Electrolytes maintained within normal limits  Outcome: Progressing  Goal: Glucose maintained within prescribed range  Outcome: Progressing     Problem: Skin/Tissue Integrity - Adult  Goal: Skin integrity remains intact  Outcome: Progressing     Problem: Nutrition Deficit:  Goal: Optimize nutritional status  10/24/2022 1057 by Carissa Ibarra RD, BRET  Outcome: Not Progressing

## 2022-10-24 NOTE — PLAN OF CARE
Problem: Discharge Planning  Goal: Discharge to home or other facility with appropriate resources  Outcome: Not Progressing  Flowsheets (Taken 10/23/2022 2016)  Discharge to home or other facility with appropriate resources:   Identify barriers to discharge with patient and caregiver   Arrange for needed discharge resources and transportation as appropriate   Refer to discharge planning if patient needs post-hospital services based on physician order or complex needs related to functional status, cognitive ability or social support system   Identify discharge learning needs (meds, wound care, etc)     Problem: Pain  Goal: Verbalizes/displays adequate comfort level or baseline comfort level  Outcome: Progressing  Flowsheets (Taken 10/23/2022 2016)  Verbalizes/displays adequate comfort level or baseline comfort level:   Assess pain using appropriate pain scale   Administer analgesics based on type and severity of pain and evaluate response   Implement non-pharmacological measures as appropriate and evaluate response   Notify Licensed Independent Practitioner if interventions unsuccessful or patient reports new pain     Problem: Safety - Adult  Goal: Free from fall injury  Outcome: Progressing  Flowsheets (Taken 10/23/2022 2016)  Free From Fall Injury: Instruct family/caregiver on patient safety     Problem: ABCDS Injury Assessment  Goal: Absence of physical injury  Outcome: Progressing  Flowsheets (Taken 10/23/2022 2016)  Absence of Physical Injury: Implement safety measures based on patient assessment     Problem: Skin/Tissue Integrity  Goal: Absence of new skin breakdown  Description: 1. Monitor for areas of redness and/or skin breakdown  2. Assess vascular access sites hourly  3. Every 4-6 hours minimum:  Change oxygen saturation probe site  4.   Every 4-6 hours:  If on nasal continuous positive airway pressure, respiratory therapy assess nares and determine need for appliance change or resting period.   Outcome: Progressing     Problem: Cardiovascular - Adult  Goal: Maintains optimal cardiac output and hemodynamic stability  Outcome: Progressing  Flowsheets (Taken 10/23/2022 2016)  Maintains optimal cardiac output and hemodynamic stability:   Monitor blood pressure and heart rate   Monitor urine output and notify Licensed Independent Practitioner for values outside of normal range   Administer fluid and/or volume expanders as ordered   Administer vasoactive medications as ordered   Assess for signs of decreased cardiac output  Goal: Absence of cardiac dysrhythmias or at baseline  Outcome: Progressing  Flowsheets (Taken 10/23/2022 2016)  Absence of cardiac dysrhythmias or at baseline:   Monitor cardiac rate and rhythm   Assess for signs of decreased cardiac output   Administer antiarrhythmia medication and electrolyte replacement as ordered     Problem: Respiratory - Adult  Goal: Achieves optimal ventilation and oxygenation  Outcome: Progressing  Flowsheets (Taken 10/23/2022 2016)  Achieves optimal ventilation and oxygenation:   Assess for changes in respiratory status   Assess for changes in mentation and behavior   Position to facilitate oxygenation and minimize respiratory effort   Assess the need for suctioning and aspirate as needed   Assess and instruct to report shortness of breath or any respiratory difficulty   Respiratory therapy support as indicated     Problem: Gastrointestinal - Adult  Goal: Minimal or absence of nausea and vomiting  Outcome: Progressing  Flowsheets (Taken 10/23/2022 2016)  Minimal or absence of nausea and vomiting:   Administer IV fluids as ordered to ensure adequate hydration   Maintain NPO status until nausea and vomiting are resolved   Nasogastric tube to low intermittent suction as ordered   Administer ordered antiemetic medications as needed   Provide nonpharmacologic comfort measures as appropriate   Nutrition consult to assist patient with adequate nutrition and appropriate food choices  Goal: Maintains or returns to baseline bowel function  Outcome: Progressing  Flowsheets (Taken 10/23/2022 2016)  Maintains or returns to baseline bowel function:   Assess bowel function   Administer IV fluids as ordered to ensure adequate hydration   Administer ordered medications as needed   Nutrition consult to assist patient with appropriate food choices  Goal: Maintains adequate nutritional intake  Outcome: Progressing  Flowsheets (Taken 10/23/2022 2016)  Maintains adequate nutritional intake:   Monitor percentage of each meal consumed   Identify factors contributing to decreased intake, treat as appropriate   Monitor intake and output, weight and lab values   Obtain nutritional consult as needed  Goal: Establish and maintain optimal ostomy function  Outcome: Progressing  Flowsheets (Taken 10/23/2022 2016)  Establish and maintain optimal ostomy function:   Monitor output from ostomies   Administer IV fluids and TPN as ordered   Gastric suctioning as ordered   Infuse IV Fluids/TPN as ordered     Problem: Metabolic/Fluid and Electrolytes - Adult  Goal: Electrolytes maintained within normal limits  Outcome: Progressing  Flowsheets (Taken 10/23/2022 2016)  Electrolytes maintained within normal limits:   Monitor labs and assess patient for signs and symptoms of electrolyte imbalances   Administer electrolyte replacement as ordered   Monitor response to electrolyte replacements, including repeat lab results as appropriate   Fluid restriction as ordered   Instruct patient on fluid and nutrition restrictions as appropriate  Goal: Hemodynamic stability and optimal renal function maintained  Outcome: Progressing  Flowsheets (Taken 10/23/2022 2016)  Hemodynamic stability and optimal renal function maintained:   Monitor labs and assess for signs and symptoms of volume excess or deficit   Monitor intake, output and patient weight   Monitor urine specific gravity, serum osmolarity and serum sodium as indicated or ordered   Monitor response to interventions for patient's volume status, including labs, urine output, blood pressure (other measures as available)  Goal: Glucose maintained within prescribed range  Outcome: Progressing  Flowsheets (Taken 10/23/2022 2016)  Glucose maintained within prescribed range:   Monitor blood glucose as ordered   Assess for signs and symptoms of hyperglycemia and hypoglycemia   Administer ordered medications to maintain glucose within target range   Assess barriers to adequate nutritional intake and initiate nutrition consult as needed     Problem: Skin/Tissue Integrity - Adult  Goal: Skin integrity remains intact  Outcome: Progressing  Flowsheets (Taken 10/23/2022 2016)  Skin Integrity Remains Intact:   Monitor for areas of redness and/or skin breakdown   Assess vascular access sites hourly  Goal: Incisions, wounds, or drain sites healing without S/S of infection  Outcome: Progressing  Flowsheets (Taken 10/23/2022 2016)  Incisions, Wounds, or Drain Sites Healing Without Sign and Symptoms of Infection: TWICE DAILY: Assess and document dressing/incision, wound bed, drain sites and surrounding tissue  Goal: Oral mucous membranes remain intact  Outcome: Progressing  Flowsheets (Taken 10/23/2022 2016)  Oral Mucous Membranes Remain Intact:   Assess oral mucosa and hygiene practices   Implement preventative oral hygiene regimen   Implement oral medicated treatments as ordered     Problem: Nutrition Deficit:  Goal: Optimize nutritional status  Outcome: Progressing     Problem: Discharge Planning  Goal: Discharge to home or other facility with appropriate resources  Outcome: Not Progressing  Flowsheets (Taken 10/23/2022 2016)  Discharge to home or other facility with appropriate resources:   Identify barriers to discharge with patient and caregiver   Arrange for needed discharge resources and transportation as appropriate   Refer to discharge planning if patient needs post-hospital services based on physician order or complex needs related to functional status, cognitive ability or social support system   Identify discharge learning needs (meds, wound care, etc)

## 2022-10-24 NOTE — PROGRESS NOTES
Comprehensive Nutrition Assessment    Type and Reason for Visit:  Reassess, Consult (TF ordering and management)    Nutrition Recommendations/Plan:   Monitor for ability to resume TF. Malnutrition Assessment:  Malnutrition Status:  Insufficient data (10/21/22 1215)      Nutrition Assessment:    Follow-up/Consult \"TF ordering and management\". Pt remains intubated. No sedation on board. TF was started yesterday but had emesis and has remained off. NG to suction with residuals. Will monitor for ability to resume EN. Possible trach and PEG if not withdrawling care. Will monitor for ability to resume nutrition. Nutrition Related Findings:    +1 BLE and +2 BUE edema; BM 10/23; Labs reviewed Wound Type: None       Current Nutrition Intake & Therapies:    Average Meal Intake: NPO  Average Supplements Intake: NPO  Diet NPO    Anthropometric Measures:  Height: 5' 6\" (167.6 cm)  Ideal Body Weight (IBW): 142 lbs (65 kg)    Admission Body Weight: 190 lb (86.2 kg)  Current Body Weight: 165 lb (74.8 kg), 116.2 % IBW. Weight Source: Bed Scale  Current BMI (kg/m2): 26.6        Weight Adjustment For: No Adjustment                 BMI Categories: Overweight (BMI 25.0-29. 9)    Estimated Daily Nutrient Needs:  Energy Requirements Based On: Kcal/kg  Weight Used for Energy Requirements: Current  Energy (kcal/day): 2319-8934 (25-30kcal/76kg)  Weight Used for Protein Requirements: Current  Protein (g/day):  (1.2-2.0g/76kg)  Method Used for Fluid Requirements: Other (Comment)  Fluid (ml/day): per provider    Nutrition Diagnosis:   Inadequate oral intake related to impaired respiratory function as evidenced by intubation, NPO or clear liquid status due to medical condition    Nutrition Interventions:   Food and/or Nutrient Delivery: Continue NPO  Nutrition Education/Counseling: Education not indicated  Coordination of Nutrition Care: Continue to monitor while inpatient       Goals:     Goals: Initiate nutrition support, by next RD assessment       Nutrition Monitoring and Evaluation:   Behavioral-Environmental Outcomes: None Identified  Food/Nutrient Intake Outcomes: Diet Advancement/Tolerance  Physical Signs/Symptoms Outcomes: Biochemical Data, GI Status, Fluid Status or Edema, Hemodynamic Status, Weight    Discharge Planning:     Too soon to determine     Shamirrosi George RD, LD  Contact: [phone]

## 2022-10-24 NOTE — CONSULTS
Nutrition Note    Consult addressed in previous RD note.     Electronically signed by Gerber Little RD, LD on 10/24/22 at 10:58 AM EDT    Contact: [phone]

## 2022-10-24 NOTE — PROGRESS NOTES
4 Eyes Skin Assessment     NAME:  Pily Weinberg  YOB: 1980  MEDICAL RECORD NUMBER:  [id number]    The patient is being assess for  Shift Handoff    I agree that 2 RN's have performed a thorough Head to Toe Skin Assessment on the patient. ALL assessment sites listed below have been assessed. Areas assessed by both nurses:    Head, Face, Ears, Shoulders, Back, Chest, Arms, Elbows, Hands, Sacrum. Buttock, Coccyx, Ischium, and Legs. Feet and Heels        Does the Patient have a Wound?  No noted wound(s)       Trino Prevention initiated:  Yes   Wound Care Orders initiated:  No    Pressure Injury (Stage 3,4, Unstageable, DTI, NWPT, and Complex wounds) if present place referral/consult order under [de-identified] NA    New and Established Ostomies if present place consult order under : NA      Nurse 1 eSignature: Electronically signed by Dawn Burr on 10/24/22 at 6:22 AM EDT    **SHARE this note so that the co-signing nurse is able to place an eSignature**    Nurse 2 eSignature: Electronically signed by Ana Giang RN on 10/24/22 at 8:32 AM EDT

## 2022-10-24 NOTE — PROGRESS NOTES
Hospitalist Progress Note    Patient:  Kathy Solares  Unit/Bed:I6F-0831/2108-01   YOB: 1980       MRN: [id number] Acct: [de-identified]  PCP: Marylee Alf, DO    Date of Admission: 10/20/2022  --------------------------    Chief Complaint:     Cardiac arrest    Hospital Course:     Kathy Solares is a 36 y.o. male hospitalized on 10/20/2022   after cardiac arrest.  Medical history significant for COPD, chronic hypoxic respiratory failure, history of alcohol use and pancreatitis. He is anticoagulated with Eliquis. He was found unresponsive and pulseless surrounded by vomiting. CPR was performed for prior at least 20 before ROSC achieved. No shockable rhythm. Assessment/plan:     Cardiac arrest status post ROSC  Acute hypoxic/hypercapnic respiratory failure  COPD with exacerbation  Aspiration pneumonia       Neuro:  -No meaningful neuro recovery noted,    -Repeated CT scan of the head was poor quality secondary to motion artifact. No bleeding.  -    Pulm:  -Continue vent support, currently on AC/PC ventilation,    -Vent management per critical care    -CT scan of the chest showed moderate right greater than left pleural effusion with bibasilar consolidation concerning for pneumonia. -IV diuresis started for suspected volume overload    -Continue steroid therapy for COPD exacerbation    CVS:  Status post cardiac arrest, normal EF on echocardiogram.  -Continue support    Essential hypertension/tachycardia continue home metoprolol        Renal:  Continue monitoring renal function    GI:  GI prophylaxis, enteral feeding    ID:  Currently on Bactrim for Stenotrophomonas maltophilia in the sputum culture     -off Eraxis      Endocrine:  Monitor blood sugar per ICU protocol    Hem/Onc:  Monitoring CBC  Anticoagulated with Eliquis for previous history of thromboembolism.      Skin: Continue skin care   Line: Reviewed            Code Status: Limited         DVT prophylaxis: On Eliquis Disposition: Overall prognosis remain poor. Concern for meaningful neurological recovery. Discussed in the multidisciplinary meeting    ----------------      Subjective:     Patient seen and examined  Overnight events noted  Tube feed pushed down to 1 cm to the stomach. Increased oxygen requirement noted. Diet: Diet NPO    OBJECTIVE     Exam:  /83   Pulse 93   Temp 98.7 °F (37.1 °C) (Axillary)   Resp 23   Ht 5' 6\" (1.676 m)   Wt 165 lb 12.6 oz (75.2 kg)   SpO2 (!) 89%   BMI 26.76 kg/m²      Unchanged physical exam finding as documented below    Gen: sedated  Head: Normocephalic. Atraumatic. ENT: ETT and tube feeding   in place, feeding tube in place, no oral ulceration. CVS: Nml S1S2, no murmur  RRR  Pulmomary: Reduce air entry anteriorl on the vent  Gastrointestinal: Soft, nondistended, no grimacing, . Musculoskeletal: No edema.  Warm  Neuro: Sedated, unable to assess  Psychiatry:  unable to assess             Medications:  Reviewed    Infusion Medications    sodium chloride      dextrose       Scheduled Medications    furosemide  20 mg IntraVENous BID    methylPREDNISolone  40 mg IntraVENous Q12H    sulfamethoxazole-trimethoprim (BACTRIM) IVPB  380 mg IntraVENous Q8H    metoprolol tartrate  25 mg Oral BID    docusate  100 mg Oral Daily    senna  10 mL Oral Nightly    insulin lispro  0-8 Units SubCUTAneous Q4H    polyethylene glycol  17 g Oral Daily    budesonide  1 mg Nebulization BID    lactobacillus  2 capsule Oral BID WC    mupirocin   Nasal BID    famotidine (PEPCID) injection  20 mg IntraVENous BID    sodium chloride flush  5-40 mL IntraVENous 2 times per day    apixaban  5 mg Oral BID    [Held by provider] buPROPion  300 mg Oral QAM    [Held by provider] pregabalin  50 mg Oral TID    [Held by provider] tamsulosin  0.4 mg Oral Nightly    ipratropium-albuterol  1 ampule Inhalation Q4H     PRN Meds: fentanNYL, hydrALAZINE, sodium phosphate IVPB **OR** sodium phosphate IVPB, sodium chloride flush, sodium chloride, polyethylene glycol, acetaminophen **OR** acetaminophen, glucose, dextrose bolus **OR** dextrose bolus, glucagon (rDNA), dextrose, naloxone      Intake/Output Summary (Last 24 hours) at 10/24/2022 1227  Last data filed at 10/24/2022 1200  Gross per 24 hour   Intake 2183.87 ml   Output 3265 ml   Net -1081.13 ml             Labs:   Recent Labs     10/22/22  0445 10/23/22  0427 10/24/22  0424   WBC 19.3* 15.7* 17.0*   HGB 9.2* 9.1* 9.3*   HCT 30.1* 29.9* 30.2*   * 507* 528*     Recent Labs     10/22/22  0445 10/23/22  0427 10/24/22  0424    140 137   K 3.8 3.5 4.2   CL 93* 96* 97*   CO2 37* 34* 33*   BUN 21* 22* 21*   CREATININE 0.7* 0.6* 0.6*   CALCIUM 8.9 9.0 9.4   PHOS 1.4* 2.9 3.8     Recent Labs     10/22/22  0445 10/23/22  0427 10/24/22  0424   AST 27 33 34   ALT 20 17 17   BILITOT 0.5 0.4 0.3   ALKPHOS 43 45 45     No results for input(s): INR in the last 72 hours. No results for input(s): Aleene Sauger in the last 72 hours. Urinalysis:      Lab Results   Component Value Date/Time    NITRU Negative 10/20/2022 03:28 PM    WBCUA 9 10/20/2022 03:28 PM    BACTERIA 1+ 10/20/2022 03:28 PM    RBCUA 1 10/20/2022 03:28 PM    BLOODU Negative 10/20/2022 03:28 PM    SPECGRAV 1.024 10/20/2022 03:28 PM    GLUCOSEU 100 10/20/2022 03:28 PM       Radiology:  XR CHEST PORTABLE   Final Result   Interval advancement of an enteric tube which projects below the field of   view on this exam coursing below the diaphragm. Correlate with abdominal   imaging if indicated. Unremarkable appearing endotracheal tube. Small bilateral pleural effusions with bibasilar consolidations as seen on   recent CT. CT HEAD WO CONTRAST   Final Result   Addendum (preliminary) 1 of 1   ADDENDUM:   Findings discussed directly with Pamela Tidwell at 4:48 p.m. on 10/23/2022. Motion artifact could be from the patient's ventilator.          Final   Severe motion artifact limits evaluation. With this limitation, suspect worsened or new cerebral edema with ill-defined   sulci and gray-white matter differentiation and ill-defined 4th ventricle. No acute intracranial hemorrhage. Impending herniation is difficult to   exclude secondary to severe motion artifact. Consider follow-up exam with   sedation if clinically indicated. Physician call report initiated at 4:43 p.m. on 10/23/2022. CT CHEST WO CONTRAST   Final Result   Moderate right greater than left bilateral pleural effusions with bibasilar   consolidations concerning for pneumonia. Background of mild pulmonary vascular congestion. Enteric tube has retracted into the mid esophagus. Recommend further   advancement into the stomach, at least 14 cm. Recommend discontinue use,   repositioning and confirmation of appropriate positioning with radiographic   follow-up. Physician call report initiated at 4:51 p.m. on 10/23/2022. Findings discussed directly with Alex Gorman at 4:57 p.m. on 10/23/2022. XR CHEST PORTABLE   Final Result   Supportive tubing projects in normal position. Perihilar indistinctness, most likely pulmonary edema, versus pneumonia. Persistent right basilar pleural effusion. XR CHEST PORTABLE   Final Result   Endotracheal tube is acceptable with the tip approximately 6 cm above the   riya. NG tube courses into the stomach and off the exam.      Multifocal opacities in the left lung more than the right lung. Some   improved aeration of the right lung but still of lower volume than the left   side. XR CHEST PORTABLE   Final Result   Supportive tubing projects in normal position. No pneumothorax is identified. Bilateral airspace disease appears progressive, hydrostatic pulmonary edema   favored. Right pleural effusion. XR CHEST PORTABLE   Final Result   1. Interval worsening opacification of the left lung.    2. Persistent right lower lung zone opacity. XR ABDOMEN (KUB) (SINGLE AP VIEW)   Final Result   No significant gaseous distention in the abdomen. NG tube tip projects in   region of stomach         CT HEAD WO CONTRAST   Final Result   Examination limited by patient's motion. No acute intracranial abnormalities   noted. Sinusitis of the left maxillary sinus, right and left sphenoid   sinuses, and bilateral ethmoid air cells. Suggestion of nondisplaced   bilateral nasal bones fracture         CT CERVICAL SPINE WO CONTRAST   Final Result   1. No acute gross fracture. CTA CHEST ABDOMEN PELVIS W CONTRAST   Final Result   1. CTA CHEST: Right pleural effusion with bibasilar consolidation as well as   patchy infiltrates bilateral lungs which may be related to acute infectious   inflammatory process or sequela of aspiration. 2.  Endotracheal tube tip is noted trachea, tip at the level of the aortic   knob. 3. Prominent supradiaphragmatic portion of the inferior vena cava as it   enters the right atrium, 5 cm diameter. 4. CTA ABDOMEN/PELVIS: No acute abnormality. 5.  Cholelithiasis and contracted gallbladder without other findings of acute   cholecystitis. 6. Hepatic steatosis. 7. NG tube courses within the esophagus, tip is at the proximal gastric body. 8. Bilateral adrenal nodular hyperplasia and bilateral adrenal adenomas   (benign finding requiring no additional evaluation or follow-up).          XR CHEST PORTABLE   Final Result   Right pleural effusion      Right basilar opacity could represent atelectasis or infection      Cardiomegaly                     Electronically signed by Anjali Velazquez MD on 10/24/2022 at 12:27 PM

## 2022-10-25 NOTE — PLAN OF CARE
Problem: Discharge Planning  Goal: Discharge to home or other facility with appropriate resources  Outcome: Progressing  Flowsheets (Taken 10/24/2022 2000)  Discharge to home or other facility with appropriate resources:   Identify barriers to discharge with patient and caregiver   Arrange for needed discharge resources and transportation as appropriate   Identify discharge learning needs (meds, wound care, etc)   Refer to discharge planning if patient needs post-hospital services based on physician order or complex needs related to functional status, cognitive ability or social support system     Problem: Pain  Goal: Verbalizes/displays adequate comfort level or baseline comfort level  Outcome: Progressing  Flowsheets (Taken 10/24/2022 2000)  Verbalizes/displays adequate comfort level or baseline comfort level:   Encourage patient to monitor pain and request assistance   Assess pain using appropriate pain scale   Administer analgesics based on type and severity of pain and evaluate response   Implement non-pharmacological measures as appropriate and evaluate response   Notify Licensed Independent Practitioner if interventions unsuccessful or patient reports new pain     Problem: Safety - Adult  Goal: Free from fall injury  Outcome: Progressing     Problem: ABCDS Injury Assessment  Goal: Absence of physical injury  Outcome: Progressing     Problem: Skin/Tissue Integrity  Goal: Absence of new skin breakdown  Description: 1. Monitor for areas of redness and/or skin breakdown  2. Assess vascular access sites hourly  3. Every 4-6 hours minimum:  Change oxygen saturation probe site  4. Every 4-6 hours:  If on nasal continuous positive airway pressure, respiratory therapy assess nares and determine need for appliance change or resting period.   Outcome: Progressing     Problem: Cardiovascular - Adult  Goal: Maintains optimal cardiac output and hemodynamic stability  Outcome: Progressing  Flowsheets  Taken 10/25/2022 0512 by [address] optimal cardiac output and hemodynamic stability:   Monitor blood pressure and heart rate   Monitor urine output and notify Licensed Independent Practitioner for values outside of normal range   Assess for signs of decreased cardiac output  Taken 10/24/2022 2000 by Avtar Ababsi RN  Maintains optimal cardiac output and hemodynamic stability:   Monitor blood pressure and heart rate   Monitor urine output and notify Licensed Independent Practitioner for values outside of normal range   Assess for signs of decreased cardiac output     Problem: Respiratory - Adult  Goal: Achieves optimal ventilation and oxygenation  Outcome: Progressing     Problem: Gastrointestinal - Adult  Goal: Minimal or absence of nausea and vomiting  Outcome: Progressing  Flowsheets (Taken 10/24/2022 2000)  Minimal or absence of nausea and vomiting:   Nasogastric tube to low intermittent suction as ordered   Administer IV fluids as ordered to ensure adequate hydration   Maintain NPO status until nausea and vomiting are resolved   Provide nonpharmacologic comfort measures as appropriate   Administer ordered antiemetic medications as needed   Nutrition consult to assist patient with adequate nutrition and appropriate food choices     Problem: Skin/Tissue Integrity - Adult  Goal: Skin integrity remains intact  Outcome: Progressing  Flowsheets  Taken 10/24/2022 2138  Skin Integrity Remains Intact: Monitor for areas of redness and/or skin breakdown  Taken 10/24/2022 2000  Skin Integrity Remains Intact: Monitor for areas of redness and/or skin breakdown

## 2022-10-25 NOTE — PROGRESS NOTES
Called by RN to assess low vt and vent alarming; ETT suarez bitten in half and in need of change; Dr. Luis Contreras called for sedation since pt biting down and unable to change tube suarez; tube suarez changed successfully but pt continuing to have increased work of breathing; tidal volumes and work of breathing improved with sedation; will continue to assess.

## 2022-10-25 NOTE — PROGRESS NOTES
35mg of versed wasted per policy    Electronically signed by Romaine Benz RN on 10/25/2022 at 6:37 PM     Electronically signed by Carmina Armijo RN on 10/25/2022 at 7:00 PM

## 2022-10-25 NOTE — PROGRESS NOTES
Bedside tube exchange by Chito Quintanilla NP for continuous cuff leak. Multiple Rts at bedside. Etomidate 40mg @ 1246. Stat chest xray ordered.

## 2022-10-25 NOTE — PROGRESS NOTES
Pulmonary Progress Note    CC:  Follow up respiratory failure, cardiac arrest    Subjective:  EEG did not show seizure activity but I was called to see him urgently due to what appeared to be active seizure  FIO2 at 80%   Diuresed well in past 24 hours       Intake/Output Summary (Last 24 hours) at 10/25/2022 0924  Last data filed at 10/25/2022 0830  Gross per 24 hour   Intake 1895.79 ml   Output 5030 ml   Net -3134.21 ml         PHYSICAL EXAM:  Blood pressure 132/81, pulse 96, temperature 98.8 °F (37.1 °C), temperature source Axillary, resp. rate 19, height 5' 6\" (1.676 m), weight 154 lb 15.7 oz (70.3 kg), SpO2 100 %.'  Gen: Chronically ill, comatose   Eyes: Pupils dilated   ENT: No discharge. Pharynx with ETT and NG  Neck: Trachea midline. No obvious mass. Resp: Diffuse wheezing continues  CV: Tachy No murmur or rub. GI: Firm with exhalation, diminished BS  Skin: Erythematous in areas, plethoric facie  Lymph: No cervical LAD. No supraclavicular LAD. M/S: No cyanosis. No clubbing. No joint deformity.     Neuro: withdraws to pain in all four extremities, no cough, no gag  Ext:   trace edema    Medications:    Scheduled Meds:   levETIRAcetam  1,000 mg IntraVENous Q12H    furosemide  20 mg IntraVENous BID    insulin lispro  0-8 Units SubCUTAneous Q4H    methylPREDNISolone  40 mg IntraVENous Q12H    sulfamethoxazole-trimethoprim (BACTRIM) IVPB  380 mg IntraVENous Q8H    metoprolol tartrate  25 mg Oral BID    docusate  100 mg Oral Daily    senna  10 mL Oral Nightly    polyethylene glycol  17 g Oral Daily    budesonide  1 mg Nebulization BID    lactobacillus  2 capsule Oral BID WC    mupirocin   Nasal BID    famotidine (PEPCID) injection  20 mg IntraVENous BID    sodium chloride flush  5-40 mL IntraVENous 2 times per day    apixaban  5 mg Oral BID    [Held by provider] buPROPion  300 mg Oral QAM    pregabalin  50 mg Oral TID    [Held by provider] tamsulosin  0.4 mg Oral Nightly    ipratropium-albuterol  1 ampule Inhalation Q4H       Continuous Infusions:   sodium chloride      dextrose         PRN Meds:  LORazepam, hydrALAZINE, sodium phosphate IVPB **OR** sodium phosphate IVPB, sodium chloride flush, sodium chloride, polyethylene glycol, acetaminophen **OR** acetaminophen, glucose, dextrose bolus **OR** dextrose bolus, glucagon (rDNA), dextrose    Labs:  CBC:   Recent Labs     10/23/22  0427 10/24/22  0424 10/25/22  0325   WBC 15.7* 17.0* 11.6*   HGB 9.1* 9.3* 8.8*   HCT 29.9* 30.2* 28.6*   MCV 80.8 81.2 80.6   * 528* 445     BMP:   Recent Labs     10/23/22  0427 10/24/22  0424 10/25/22  0325    137 133*   K 3.5 4.2 4.7   CL 96* 97* 93*   CO2 34* 33* 31   PHOS 2.9 3.8 4.7   BUN 22* 21* 23*   CREATININE 0.6* 0.6* 0.5*     LIVER PROFILE:   Recent Labs     10/23/22  0427 10/24/22  0424 10/25/22  0325   AST 33 34 30   ALT 17 17 14   BILITOT 0.4 0.3 <0.2   ALKPHOS 45 45 43     PT/INR: No results for input(s): PROTIME, INR in the last 72 hours. APTT: No results for input(s): APTT in the last 72 hours. UA:No results for input(s): NITRITE, COLORU, PHUR, LABCAST, WBCUA, RBCUA, MUCUS, TRICHOMONAS, YEAST, BACTERIA, CLARITYU, SPECGRAV, LEUKOCYTESUR, UROBILINOGEN, BILIRUBINUR, BLOODU, GLUCOSEU, AMORPHOUS in the last 72 hours. Invalid input(s): Bulmaro Barnhart  No results for input(s): PH, PCO2, PO2 in the last 72 hours. Films:  Chest imaging reports were reviewed and imaging was reviewed by me and shows ETT, Gastric tube in place. Right sided airspace disease and effusion. Improved right sided airspace disease     AB.3/79    Cultures:  BAL:  stenotrophomonas     I reviewed the labs and images listed above    Assessment/Plan:   Cardiopulmonary Arrest, PEA at nursing home   PS 10 over 10 to allow better ventilator synchrony.   Continue with this as tolerated   VBG at 12 to assess CO2  Continue to hold continuous sedation  Daily ABG  Titrate oxygen for saturations greater than or equal to 90%  Chronic Hypoxic/Hypercapnic Respiratory Failure  PS mode started today. VBG at 12 to assess CO2  Daily ABG otherwise  Gram negative pneumonia due to stenotrophomonas   Bactrim for 7 days  Lactobacillus   Anoxic Encephalopathy   EEG without signs of seizure   COPD   Pulmicort q 12   Duonebs q 4 hours  Solumedrol   Bilateral pleural effusions  Lasix 20 mg IV BID   Myoclonus   EEG without seizure on 10/24  Ileus, improving based on less NG output     Resume trickle feeding today   Appeared to have an active seizure this am (NEW)  Ativan 4 mg IV once. Improved myoclonus, tachycardia and BP after dose  PRN ativan  Start Keppra bid  Discontinue fentanyl and narcotics (will lower seizure threshold)    Prognosis is poor for any meaningful neurological recovery     Resume home lyrica     GI prophylaxis  Pepcid  DVT prophylaxis  Eliquis     Critical care time of 31 minutes. This does not include procedural time. Please see procedural notes for details.     DO Pam[facility]

## 2022-10-25 NOTE — PROCEDURES
Intubation Procedure Note    Indication: persistent cuff leak on existing ETT    Consent: Unable to be obtained due to the emergent nature of this procedure. Procedure:  Sedation: etomidate and ativan  Paralytic: None  Equipment: GlideScope; 7.5mm cuffed endotracheal tube. View: bougie used    Procedure: Bougie inserted through existing ETT, existing ETT removed without complication. New 7.5mm ETT placed over bougie. Etomidate had to be given as patient was biting down on tubing/bougie. The cuff was then inflated and the tube was secured appropriately at a distance of 25 cm to the dental ridge and Adriana was applied to secure ETT in place. Patient desaturated to 45% during procedure but quickly recovered to 93%.     Confirmed by: bilateral breath sounds, adequate chest rise, and adequate pulse oximetry reading; CXR ordered STAT for placement verification    RAD[facility], [facility] Care

## 2022-10-25 NOTE — PROGRESS NOTES
Air leak noted in the OG tube. No air bolus heard in the abdomen when checking for placement. Small hole noticed on the bottom side of OG tube right above patients left bottom incisor. New OG placed at the 65 cm kris, air bolus noted. Stat CXR ordered to verify placement.

## 2022-10-25 NOTE — CONSULTS
Neurology Consult Note  Reason for Consult: cardiac arrest, r/o non epileptic seizures, ?need for continuous EEG    Chief complaint: unable to obtain from the patient at this time    Dr Desi Martínez MD asked me to see Salvador Shah in consultation for evaluation of cardiac arrest, r/o non epileptic seizures, ?need for continuous EEG    History of Present Illness:  Salvador Shah is a 36 y.o. male who presents with cardiac arrest.     I obtained my information via discussion w/ the patient's RN, supplemented by chart review. The patient arrived to the ED 5 days ago reportedly after a witnessed cardiopulmonary arrest.  Per chart, it may have taken up to 30 minutes for ROSC to be achieved. Initial CT head was significantly motion degraded. No hemorrhage. He was intubated though oxygenation was still challenging. Propofol has been off since the 22nd. Mental status has remained poor. He underwent another CT head w/o 2 days ago that was again significantly motion degraded though seemed to be some progression. EEG done yesterday was w/out any epileptiform discharges. He apparently had generalized convulsive event w/ autonomic changes which ICU staff felt was an epileptic seizure. He is now on Keppra. Currently his is unresponsive in bed. He does have a bit of R facial twitching at times. Some rhythmic movement of his R shoulder is noted though seems to correlate w/ the ventilator. He was evaluated by [facility] neurology about a year ago for spells of limb stiffening. Routine EEG at the time showed beta activity. In March he apparently had a couple of other spells w/ LOC and body stiffening w/ confusion afterwards. I don't see any documentation beyond that. Medical History:  History reviewed. No pertinent past medical history. History reviewed. No pertinent surgical history.     Scheduled Meds:   levETIRAcetam  1,000 mg IntraVENous Q12H    sulfamethoxazole-trimethoprim (BACTRIM) IVPB  5 mg/kg IntraVENous Q8H    furosemide  20 mg IntraVENous BID    insulin lispro  0-8 Units SubCUTAneous Q4H    methylPREDNISolone  40 mg IntraVENous Q12H    metoprolol tartrate  25 mg Oral BID    docusate  100 mg Oral Daily    senna  10 mL Oral Nightly    polyethylene glycol  17 g Oral Daily    budesonide  1 mg Nebulization BID    lactobacillus  2 capsule Oral BID WC    mupirocin   Nasal BID    famotidine (PEPCID) injection  20 mg IntraVENous BID    sodium chloride flush  5-40 mL IntraVENous 2 times per day    apixaban  5 mg Oral BID    [Held by provider] buPROPion  300 mg Oral QAM    pregabalin  50 mg Oral TID    [Held by provider] tamsulosin  0.4 mg Oral Nightly    ipratropium-albuterol  1 ampule Inhalation Q4H     Medications Prior to Admission:   acetaminophen (TYLENOL) 500 MG tablet, Take 1,000 mg by mouth every 6 hours as needed for Pain or Fever  LORazepam (ATIVAN) 0.5 MG tablet, Take 0.5 mg by mouth every 8 hours as needed for Anxiety. azithromycin (ZITHROMAX) 250 MG tablet, Take 250 mg by mouth three times a week Mon/Wed/Fri  buPROPion (WELLBUTRIN XL) 300 MG extended release tablet, Take 300 mg by mouth every morning  apixaban (ELIQUIS) 5 MG TABS tablet, Take 5 mg by mouth 2 times daily  hydrOXYzine HCl (ATARAX) 25 MG tablet, Take 25 mg by mouth in the morning, at noon, and at bedtime  ipratropium-albuterol (DUONEB) 0.5-2.5 (3) MG/3ML SOLN nebulizer solution, Inhale 1 vial into the lungs every 6 hours as needed for Shortness of Breath  pregabalin (LYRICA) 50 MG capsule, Take 50 mg by mouth 3 times daily.   metoprolol tartrate (LOPRESSOR) 25 MG tablet, Take 25 mg by mouth 2 times daily  mometasone (ASMANEX) 200 MCG/ACT AERO inhaler, Inhale 2 puffs into the lungs 2 times daily  montelukast (SINGULAIR) 10 MG tablet, Take 10 mg by mouth nightly  nicotine (NICODERM CQ) 21 MG/24HR, Place 1 patch onto the skin every 24 hours  predniSONE (DELTASONE) 20 MG tablet, Take 20 mg by mouth See Admin Instructions 20 mg through 10/21/22, then decrease to 10 mg daily through 11/11/22  pantoprazole (PROTONIX) 40 MG tablet, Take 40 mg by mouth daily  oxyCODONE (ROXICODONE) 5 MG immediate release tablet, Take 5 mg by mouth every 6 hours as needed for Pain.  tamsulosin (FLOMAX) 0.4 MG capsule, Take 0.4 mg by mouth at bedtime    Allergies   Allergen Reactions    Vancomycin      Unknown reaction     History reviewed. No pertinent family history. Social History     Tobacco Use   Smoking Status Unknown   Smokeless Tobacco Not on file     Social History     Substance and Sexual Activity   Drug Use Not on file    Comment: goyo     Social History     Substance and Sexual Activity   Alcohol Use None    Comment: goyo     ROS - afebrile. Chart reviewed. Exam  Blood pressure (!) 162/116, pulse 96, temperature 98.9 °F (37.2 °C), temperature source Oral, resp. rate 19, height 5' 6\" (1.676 m), weight 154 lb 15.7 oz (70.3 kg), SpO2 100 %. Constitutional    Vital signs: BP, HR, and RR reviewed   General depressed mental status, intubated. Eyes: fundoscopic exam difficult given inability to cooperate  Cardiovascular: no peripheral edema. Psychiatric: limited exam given encephalopathy   Neurologic  Mental status: limited exam given encephalopathy  orientation goyo due to encephalopathy  General fund of knowledge limited exam given encephalopathy   Memory limited exam given encephalopathy  Attention poor  Language limited exam given encephalopathy  Comprehension not following any commands  CN2: was unable to obtain a corneal reflex. CN 3,4,6: gaze is conjugate. No forced deviation or preference. Pupils are 6 mm bilaterally, reactive though sluggish. CN5: facial sensation limited exam given encephalopathy  CN7: face symmetric but exam limited by ET tube  CN8: hearing limited exam given encephalopathy  CN9: no observed gag reflex.     CN11: limited exam given encephalopathy  CN12: limited exam given encephalopathy  Strength: goyo due to encephalopathy  Sensory: he is not reactive to noxious pain stimulus. Cerebellar/coordination:limited exam given encephalopathy  Tone: no increased tone or rigidity. Gait: limited exam given encephalopathy and intubated with ventilator. Labs  Glucose 120  Na 133  K 4.7  Cl 93  BUN 23  Cr 0.5  Mg 2.20    ALT 14  AST 30    WBC 11.6K  Hg 8.8  Platelets 759    + respiratory culture    Studies  CT head w/o 10/23/22, independently reviewed  Impression   Severe motion artifact limits evaluation. With this limitation, suspect worsened or new cerebral edema with ill-defined   sulci and gray-white matter differentiation and ill-defined 4th ventricle. No acute intracranial hemorrhage. Impending herniation is difficult to   exclude secondary to severe motion artifact. Consider follow-up exam with   sedation if clinically indicated. CT head w/o 10/20/22, independently reviewed  Impression   Examination limited by patient's motion. No acute intracranial abnormalities   noted. Sinusitis of the left maxillary sinus, right and left sphenoid   sinuses, and bilateral ethmoid air cells. Suggestion of nondisplaced   bilateral nasal bones fracture     EEG 10/24/22  Moderately severe background slowing and disorganization along with some superimposed bihemispheric suppression. Impression  Suspect anoxic brain injury secondary to prolonged cardiopulmonary arrest.  Seizure like activity. ?hx of seizures. Pneumonia. Recommendations  MRI brain w/o. Ordered and discussed w/ RN. Sherman Rojas.       Mahendra Luque NP  [address] [place] [address] [id number] Neurology    A copy of this note was provided for Dr David Mason MD

## 2022-10-25 NOTE — PROGRESS NOTES
Hospitalist Progress Note      PCP: Geoffrey Esqueda DO    Date of Admission: 10/20/2022      Hospital Course: pt admitted following a cardiac arrest at an [facility] Managed for acute hypoxic hypercapnic respiratory failure, aspiration pneumonia with concern for seizure-like activity and anoxic brain injury. Subjective: Patient seen and examined, sedated on vent support.       Medications:  Reviewed    Infusion Medications    sodium chloride      dextrose       Scheduled Medications    levETIRAcetam  1,000 mg IntraVENous Q12H    furosemide  20 mg IntraVENous BID    insulin lispro  0-8 Units SubCUTAneous Q4H    methylPREDNISolone  40 mg IntraVENous Q12H    sulfamethoxazole-trimethoprim (BACTRIM) IVPB  380 mg IntraVENous Q8H    metoprolol tartrate  25 mg Oral BID    docusate  100 mg Oral Daily    senna  10 mL Oral Nightly    polyethylene glycol  17 g Oral Daily    budesonide  1 mg Nebulization BID    lactobacillus  2 capsule Oral BID WC    mupirocin   Nasal BID    famotidine (PEPCID) injection  20 mg IntraVENous BID    sodium chloride flush  5-40 mL IntraVENous 2 times per day    apixaban  5 mg Oral BID    [Held by provider] buPROPion  300 mg Oral QAM    pregabalin  50 mg Oral TID    [Held by provider] tamsulosin  0.4 mg Oral Nightly    ipratropium-albuterol  1 ampule Inhalation Q4H     PRN Meds: LORazepam, hydrALAZINE, sodium phosphate IVPB **OR** sodium phosphate IVPB, sodium chloride flush, sodium chloride, polyethylene glycol, acetaminophen **OR** acetaminophen, glucose, dextrose bolus **OR** dextrose bolus, glucagon (rDNA), dextrose      Intake/Output Summary (Last 24 hours) at 10/25/2022 1025  Last data filed at 10/25/2022 0830  Gross per 24 hour   Intake 1895.79 ml   Output 4880 ml   Net -2984.21 ml       Physical Exam Performed:    /81   Pulse 96   Temp 98.8 °F (37.1 °C) (Axillary)   Resp 19   Ht 5' 6\" (1.676 m)   Wt 154 lb 15.7 oz (70.3 kg)   SpO2 100%   BMI 25.01 kg/m²     General appearance: No apparent distress, appears stated age and cooperative. HEENT: Pupils equal, round, and reactive to light. Conjunctivae/corneas clear. Respiratory:  Normal respiratory effort. Clear to auscultation  Cardiovascular: Regular rate and rhythm with normal S1/S2   Abdomen: Soft, non-distended BS+  Musculoskeletal: No clubbing, cyanosis or edema       Labs:   Recent Labs     10/23/22  0427 10/24/22  0424 10/25/22  0325   WBC 15.7* 17.0* 11.6*   HGB 9.1* 9.3* 8.8*   HCT 29.9* 30.2* 28.6*   * 528* 445     Recent Labs     10/23/22  0427 10/24/22  0424 10/25/22  0325    137 133*   K 3.5 4.2 4.7   CL 96* 97* 93*   CO2 34* 33* 31   BUN 22* 21* 23*   CREATININE 0.6* 0.6* 0.5*   CALCIUM 9.0 9.4 8.9   PHOS 2.9 3.8 4.7     Recent Labs     10/23/22  0427 10/24/22  0424 10/25/22  0325   AST 33 34 30   ALT 17 17 14   BILITOT 0.4 0.3 <0.2   ALKPHOS 45 45 43     No results for input(s): INR in the last 72 hours. No results for input(s): Mac Pander in the last 72 hours. Urinalysis:      Lab Results   Component Value Date/Time    NITRU Negative 10/20/2022 03:28 PM    WBCUA 9 10/20/2022 03:28 PM    BACTERIA 1+ 10/20/2022 03:28 PM    RBCUA 1 10/20/2022 03:28 PM    BLOODU Negative 10/20/2022 03:28 PM    SPECGRAV 1.024 10/20/2022 03:28 PM    GLUCOSEU 100 10/20/2022 03:28 PM       Radiology:  XR CHEST PORTABLE   Final Result   Endotracheal tube tip is approximately 6-7 cm above the riya. NG tube is   well into the stomach. Cardiac silhouette remains enlarged with perihilar and basilar opacities. Right greater than left pleural effusion. Had similar features on recent   x-ray and see the CT for greater details. XR CHEST PORTABLE   Final Result   Interval advancement of an enteric tube which projects below the field of   view on this exam coursing below the diaphragm. Correlate with abdominal   imaging if indicated. Unremarkable appearing endotracheal tube.       Small bilateral pleural effusions with bibasilar consolidations as seen on   recent CT. CT HEAD WO CONTRAST   Final Result   Addendum (preliminary) 1 of 1   ADDENDUM:   Findings discussed directly with Russel Oglesby at 4:48 p.m. on 10/23/2022. Motion artifact could be from the patient's ventilator. Final   Severe motion artifact limits evaluation. With this limitation, suspect worsened or new cerebral edema with ill-defined   sulci and gray-white matter differentiation and ill-defined 4th ventricle. No acute intracranial hemorrhage. Impending herniation is difficult to   exclude secondary to severe motion artifact. Consider follow-up exam with   sedation if clinically indicated. Physician call report initiated at 4:43 p.m. on 10/23/2022. CT CHEST WO CONTRAST   Final Result   Moderate right greater than left bilateral pleural effusions with bibasilar   consolidations concerning for pneumonia. Background of mild pulmonary vascular congestion. Enteric tube has retracted into the mid esophagus. Recommend further   advancement into the stomach, at least 14 cm. Recommend discontinue use,   repositioning and confirmation of appropriate positioning with radiographic   follow-up. Physician call report initiated at 4:51 p.m. on 10/23/2022. Findings discussed directly with Russel Oglesby at 4:57 p.m. on 10/23/2022. XR CHEST PORTABLE   Final Result   Supportive tubing projects in normal position. Perihilar indistinctness, most likely pulmonary edema, versus pneumonia. Persistent right basilar pleural effusion. XR CHEST PORTABLE   Final Result   Endotracheal tube is acceptable with the tip approximately 6 cm above the   riya. NG tube courses into the stomach and off the exam.      Multifocal opacities in the left lung more than the right lung. Some   improved aeration of the right lung but still of lower volume than the left   side.          XR CHEST PORTABLE Final Result   Supportive tubing projects in normal position. No pneumothorax is identified. Bilateral airspace disease appears progressive, hydrostatic pulmonary edema   favored. Right pleural effusion. XR CHEST PORTABLE   Final Result   1. Interval worsening opacification of the left lung. 2. Persistent right lower lung zone opacity. XR ABDOMEN (KUB) (SINGLE AP VIEW)   Final Result   No significant gaseous distention in the abdomen. NG tube tip projects in   region of stomach         CT HEAD WO CONTRAST   Final Result   Examination limited by patient's motion. No acute intracranial abnormalities   noted. Sinusitis of the left maxillary sinus, right and left sphenoid   sinuses, and bilateral ethmoid air cells. Suggestion of nondisplaced   bilateral nasal bones fracture         CT CERVICAL SPINE WO CONTRAST   Final Result   1. No acute gross fracture. CTA CHEST ABDOMEN PELVIS W CONTRAST   Final Result   1. CTA CHEST: Right pleural effusion with bibasilar consolidation as well as   patchy infiltrates bilateral lungs which may be related to acute infectious   inflammatory process or sequela of aspiration. 2.  Endotracheal tube tip is noted trachea, tip at the level of the aortic   knob. 3. Prominent supradiaphragmatic portion of the inferior vena cava as it   enters the right atrium, 5 cm diameter. 4. CTA ABDOMEN/PELVIS: No acute abnormality. 5.  Cholelithiasis and contracted gallbladder without other findings of acute   cholecystitis. 6. Hepatic steatosis. 7. NG tube courses within the esophagus, tip is at the proximal gastric body. 8. Bilateral adrenal nodular hyperplasia and bilateral adrenal adenomas   (benign finding requiring no additional evaluation or follow-up).          XR CHEST PORTABLE   Final Result   Right pleural effusion      Right basilar opacity could represent atelectasis or infection      Cardiomegaly Assessment/Plan:    Active Hospital Problems    Diagnosis     Chronic respiratory failure with hypoxia and hypercapnia (HCC) [J96.11, J96.12]      Priority: Medium    Gram-negative pneumonia ([facility] [address] [J15.6]      Priority: Medium    Anoxic encephalopathy ([facility] [address] [G93.1]      Priority: Medium    Respiratory arrest ([facility] [address] [R09.2]      Priority: Medium     Vent support management per CC team   Noted initiation of keppra for seizure like activity  Referral to neurology to assess , ?  Non epileptic sz   Cont empiric abx  Aspiration precautions   Q2h turns   Supportive care    DVT Prophylaxis: eliquis  Diet: Diet NPO  ADULT TUBE FEEDING; Nasogastric; Standard with Fiber; Continuous; 10; No; 30; Q 1 hour  Code Status: Limited      Dispo - possible dc back to [facility] palliative care pending progress and planned work up          Kathleen Cordero MD

## 2022-10-25 NOTE — CARE COORDINATION
[facility]  Palliative Care   Progress Note    NAME:  Juju Reid RECORD NUMBER:  [id number]  AGE: 36 y.o. GENDER: male  : 1980  TODAY'S DATE:  10/25/2022    Subjective: Patient remains on vent, min response. Objective:    Vitals:    10/25/22 1140   BP:    Pulse: 90   Resp: 20   Temp:    SpO2: 100%     Lab Results   Component Value Date    WBC 11.6 (H) 10/25/2022    HGB 8.8 (L) 10/25/2022    HCT 28.6 (L) 10/25/2022    MCV 80.6 10/25/2022     10/25/2022     Lab Results   Component Value Date    CREATININE 0.5 (L) 10/25/2022    BUN 23 (H) 10/25/2022     (L) 10/25/2022    K 4.7 10/25/2022    CL 93 (L) 10/25/2022    CO2 31 10/25/2022     Lab Results   Component Value Date    ALT 14 10/25/2022    AST 30 10/25/2022    ALKPHOS 43 10/25/2022    BILITOT <0.2 10/25/2022       Plan: I have spoken to his sister Jarrell Lundberg she will not be in tonight till 6:30 PM.  She does not have availability to come in during the day till Friday. Plan for MRI will see what neuro consult advises after that. Code Status: Limited  Discharge Environment:  [] Hospice Consult Agency:  [] Inpatient Hospice    [] Home with [address] [place] [address]   [] ECF with Hospice  [] ECF skilled care with Hospice to follow   [] Other:    Teaching Time:  0hours  30 min     I will continue to follow Mr. Ama Chambers care as needed. Thank you for allowing me to participate in the care of Mr. Lynda Quiroga .      Electronically signed by Gris Guido, RN, BSN,CHPN on 10/25/2022 at 11:48 AM  Palliative Care Nurse [facility]  Office: [phone]

## 2022-10-25 NOTE — PROGRESS NOTES
0205: vent alarming low tidal volume and circuit disconnect. Repositioning and suctioning interventions ineffective. Tubing and lines checked. Call placed to [facility] AND [facility] therapist. On her way to bedside to evaluate patient. 3467: patient biting down on ETT/OGT and very tense. One time dose of Fentanyl 25 mcg given per Dr. Gustavo Dinh    [phone]: ETT suarez changed successfully per respiratory therapy team. Will continue to monitor.

## 2022-10-25 NOTE — CARE COORDINATION
Chart Reviewed. Spoke with Prakash Mcwilliams at [address] [facility]. She confirmed he will need a prior auth to return to [address] [place] [address] as his insurance is a [employer] product. Following for D needs.   [place], [place]     Case Management   [phone]    10/25/2022  11:35 AM

## 2022-10-25 NOTE — PROGRESS NOTES
Attempted to call patients sister Gayathri Butterfield for updates. No answer- message left on voicemail. Annie Thomas called- updated on patient status, all questions answered. This nurse instructed to call Annie Thomas back with any changes or if family decides to withdraw care. Otherwise, they will follow up again tomorrow.

## 2022-10-26 NOTE — PROGRESS NOTES
4 Eyes Skin Assessment     NAME:  Alejo Gastelum  YOB: 1980  MEDICAL RECORD NUMBER:  [id number]    The patient is being assess for  Shift Handoff    I agree that 2 RN's have performed a thorough Head to Toe Skin Assessment on the patient. ALL assessment sites listed below have been assessed. Areas assessed by both nurses:    Head, Face, Ears, Shoulders, Back, Chest, Arms, Elbows, Hands, Sacrum. Buttock, Coccyx, Ischium, and Legs. Feet and Heels        Does the Patient have a Wound?  No noted wound(s)       Trino Prevention initiated:  Yes   Wound Care Orders initiated:  NA    Pressure Injury (Stage 3,4, Unstageable, DTI, NWPT, and Complex wounds) if present place referral/consult order under [de-identified] NA    New and Established Ostomies if present place consult order under : NA      Nurse 1 eSignature: Electronically signed by Lena Chang RN on 10/25/22 at 7:30 PM EDT    **SHARE this note so that the co-signing nurse is able to place an eSignature**    Nurse 2 eSignature: Electronically signed by Theresa Gomez RN on 10/25/22 at 8:00 PM EDT

## 2022-10-26 NOTE — CARE COORDINATION
[facility]  Palliative Care   Progress Note    NAME:  Juju Haile [employer]ender RECORD NUMBER:  [id number]  AGE: 36 y.o. GENDER: male  : 1980  TODAY'S DATE:  10/26/2022    Subjective: Patient remains on vent, min response to pain,     Objective:    Vitals:    10/26/22 1200   BP: 122/72   Pulse: (!) 110   Resp: 29   Temp: 99.2 °F (37.3 °C)   SpO2: (!) 86%     Lab Results   Component Value Date    WBC 14.7 (H) 10/26/2022    HGB 9.2 (L) 10/26/2022    HCT 30.1 (L) 10/26/2022    MCV 81.3 10/26/2022     (H) 10/26/2022     Lab Results   Component Value Date    CREATININE 0.6 (L) 10/26/2022    BUN 20 10/26/2022     (L) 10/26/2022    K 5.2 (H) 10/26/2022    CL 94 (L) 10/26/2022    CO2 31 10/26/2022     Lab Results   Component Value Date    ALT 14 10/25/2022    AST 30 10/25/2022    ALKPHOS 43 10/25/2022    BILITOT <0.2 10/25/2022       Plan: I have spoken to his sister Stephanie Avalos to discuss goals of care after she received results of MRI last night. I answered all of her questions and she feels he would not want to continue with aggressive care if he will not be able to participate, she felt he would not want to live on machines. She requested I call his other sister Jaylene Harrison, the number listed is out of service. Dr Camron Hensley informed me after she spoke with Stephanie Avalos she is agreeable to continuous EEG at [facility]. Code Status: Limited  Discharge Environment:  [] Hospice Consult Agency:  [] Inpatient Hospice    [] Home with [address]   [] ECF with Hospice  [] ECF skilled care with Hospice to follow   [] Other:    Teaching Time:  0hours  30 min     I will continue to follow Mr. Michael Medina care as needed. Thank you for allowing me to participate in the care of  [facility] .      Electronically signed by Bhargav Kaplan, RN, BSN,CHPN on 10/26/2022 at 12:42 PM  [address] [place] [address]  Office: [phone]

## 2022-10-26 NOTE — PROGRESS NOTES
4 Eyes Skin Assessment     NAME:  Lindasy Lynn  YOB: 1980  MEDICAL RECORD NUMBER:  [id number]    The patient is being assess for  Shift Handoff    I agree that 2 RN's have performed a thorough Head to Toe Skin Assessment on the patient. ALL assessment sites listed below have been assessed. Areas assessed by both nurses:    Head, Face, Ears, Shoulders, Back, Chest, Arms, Elbows, Hands, Sacrum. Buttock, Coccyx, Ischium, and Legs. Feet and Heels        Does the Patient have a Wound?  No noted wound(s)       Trino Prevention initiated:  Yes   Wound Care Orders initiated:  NA    Pressure Injury (Stage 3,4, Unstageable, DTI, NWPT, and Complex wounds) if present place referral/consult order under [de-identified] NA    New and Established Ostomies if present place consult order under : NA      Nurse 1 eSignature: Electronically signed by Brenda Sherwood RN on 10/26/22 at 6:59 AM EDT    **SHARE this note so that the co-signing nurse is able to place an eSignature**    Nurse 2 eSignature: {Esignature:[id number]}

## 2022-10-26 NOTE — H&P
ICU HISTORY AND PHYSICAL       Hospital Day: 6  ICU Day: 6                                                         Code:Limited  Admit Date: (Not on file)  PCP: Neftali Izaguirre DO                                  CC: Cardiopulmonary arrest    HISTORY OF PRESENT ILLNESS:   Per  ED: Tasha Greene is a 36 y.o. male who transferred to [facility] from [facility]. Per chart review, the pt presented to [address] [place] [address],[address] ED via EMS in cardiac arrest.  \"History from EMS is that this patient was at a care facility. He has a history of severe COPD, with a history of noncompliance with his oxygen. He was found down at the care facility. EMS was called. EMS reports that they gave 2 A of bicarb, 4 rounds of epinephrine and they also did CPR through [place] device. They intubated the patient with a [place] airway. They did obtain return of spontaneous circulation. Patient is unable to provide history and currently there is no family to provide any further history. \"    Per  DC Summ: Patient admitted 5 days ago to [facility] [place] after an out of hospital cardiac arrest.  He is currently on vent support. Recovery postarrest been after holding sedation has not progressed. MRI concerning for underlying anoxic brain injury. There is also concern for possible underlying nonepileptic seizures given noted seizure activity on 10/24/2022. After discussion with patient's family and neurology patient is being transferred to [facility]. for continuous EEG to further assess for any ongoing seizure activity. Family is aware that prognosis is poor given the findings on MRI. Patient is medically stable for transfer to [facility].    Currently, the patient is intubated and mechanically ventilated, unresponsive. PAST HISTORY:   No past medical history on file. No past surgical history on file.     SocialHistory:   The patient lives at nursing facility    Alcohol: 1-2 drinks 2-3 times per week  Illicit drugs: no use  Tobacco: 1/4 ppd 20 years     Family History:  No family history on file.     MEDICATIONS:     Current Facility-Administered Medications on File Prior to Encounter   Medication Dose Route Frequency Provider Last Rate Last Admin    LORazepam (ATIVAN) injection 2 mg  2 mg IntraVENous Q1H PRN Stevsol Cota, APRN - CNP   2 mg at 10/26/22 1743    levETIRAcetam (KEPPRA) 1000 mg/100 mL IVPB  1,000 mg IntraVENous Q12H Steva Beata APRN - CNP   Stopped at 10/26/22 4781    sulfamethoxazole-trimethoprim (BACTRIM) 352 mg in dextrose 5 % 500 mL IVPB  5 mg/kg IntraVENous Q8H Alicia Leonid, DO   Stopped at 10/26/22 1123    furosemide (LASIX) injection 20 mg  20 mg IntraVENous BID Alicia Jaquez DO   20 mg at 10/26/22 1743    insulin lispro (HUMALOG) injection vial 0-8 Units  0-8 Units SubCUTAneous Q4H Zevtara Scott DO        methylPREDNISolone sodium (SOLU-MEDROL) injection 40 mg  40 mg IntraVENous Q12H Kassi Sanders MD   40 mg at 10/26/22 0852    metoprolol tartrate (LOPRESSOR) tablet 25 mg  25 mg Oral BID Adalid Mueller MD   25 mg at 10/26/22 0845    hydrALAZINE (APRESOLINE) injection 5 mg  5 mg IntraVENous Q6H PRN Zev Scott DO   5 mg at 10/24/22 1504    sodium phosphate 12.6 mmol in sodium chloride 0.9 % 250 mL IVPB  0.16 mmol/kg IntraVENous PRN Kassi Sanders MD        Or    sodium phosphate 25.17 mmol in sodium chloride 0.9 % 250 mL IVPB  0.32 mmol/kg IntraVENous PRN Kassi Sanders MD   Stopped at 10/22/22 1824    docusate (COLACE) 50 MG/5ML liquid 100 mg  100 mg Oral Daily Steva Rhpriti, APRN - CNP   100 mg at 10/26/22 0845    senna (SENOKOT) 176 MG/5ML syrup 10 mL  10 mL Oral Nightly Steva Rhyme, APRN - CNP   10 mL at 10/25/22 2137    polyethylene glycol (GLYCOLAX) packet 17 g  17 g Oral Daily Steva Rhyme, APRN - CNP   17 g at 10/26/22 0847    budesonide (PULMICORT) nebulizer suspension 1,000 mcg  1 mg Nebulization BID Kassi Sanders MD   1,000 mcg at 10/26/22 7184 lactobacillus (CULTURELLE) capsule 2 capsule  2 capsule Oral BID  Jarrod Dobson, APRN - CNP   2 capsule at 10/26/22 1743    famotidine (PEPCID) 20 mg in sodium chloride (PF) 10 mL injection  20 mg IntraVENous BID Danna Leyden, MD   20 mg at 10/26/22 0930    sodium chloride flush 0.9 % injection 5-40 mL  5-40 mL IntraVENous 2 times per day Danna Leyden, MD   10 mL at 10/26/22 0900    sodium chloride flush 0.9 % injection 5-40 mL  5-40 mL IntraVENous PRN Jean-Claude Mayorga MD        0.9 % sodium chloride infusion   IntraVENous PRN Danna Leyden, MD        polyethylene glycol (GLYCOLAX) packet 17 g  17 g Oral Daily PRN Danna Leyden, MD        acetaminophen (TYLENOL) tablet 650 mg  650 mg Oral Q6H PRN Danna Leyden, MD   650 mg at 10/21/22 1022    Or    acetaminophen (TYLENOL) suppository 650 mg  650 mg Rectal Q6H PRN Danna Leyden, MD   650 mg at 10/21/22 0347    apixaban (ELIQUIS) tablet 5 mg  5 mg Oral BID Danna Leyden, MD   5 mg at 10/26/22 0845    [Held by provider] buPROPion (WELLBUTRIN XL) extended release tablet 300 mg  300 mg Oral QAM Danna Leyden, MD        pregabalin (LYRICA) capsule 50 mg  50 mg Oral TID Danna Leyden, MD   50 mg at 10/26/22 1324    [Held by provider] tamsulosin (FLOMAX) capsule 0.4 mg  0.4 mg Oral Nightly Jean-Claude Mayorga MD        glucose chewable tablet 16 g  4 tablet Oral PRN Zev KERRY Scott, DO        dextrose bolus 10% 125 mL  125 mL IntraVENous PRN Zev KERRY Scott, DO        Or    dextrose bolus 10% 250 mL  250 mL IntraVENous PRN Zev KERRY Scott, DO        glucagon (rDNA) injection 1 mg  1 mg SubCUTAneous PRN Zev KERRY Scott, DO        dextrose 10 % infusion   IntraVENous Continuous PRN Zev KERRY Scott, DO        ipratropium-albuterol (DUONEB) nebulizer solution 1 ampule  1 ampule Inhalation Q4H Alyssa Butler MD   1 ampule at 10/26/22 1545     Current Outpatient Medications on File Prior to Encounter   Medication Sig Dispense Refill acetaminophen (TYLENOL) 500 MG tablet Take 1,000 mg by mouth every 6 hours as needed for Pain or Fever      LORazepam (ATIVAN) 0.5 MG tablet Take 0.5 mg by mouth every 8 hours as needed for Anxiety. azithromycin (ZITHROMAX) 250 MG tablet Take 250 mg by mouth three times a week Mon/Wed/Fri      buPROPion (WELLBUTRIN XL) 300 MG extended release tablet Take 300 mg by mouth every morning      apixaban (ELIQUIS) 5 MG TABS tablet Take 5 mg by mouth 2 times daily      hydrOXYzine HCl (ATARAX) 25 MG tablet Take 25 mg by mouth in the morning, at noon, and at bedtime      ipratropium-albuterol (DUONEB) 0.5-2.5 (3) MG/3ML SOLN nebulizer solution Inhale 1 vial into the lungs every 6 hours as needed for Shortness of Breath      pregabalin (LYRICA) 50 MG capsule Take 50 mg by mouth 3 times daily. metoprolol tartrate (LOPRESSOR) 25 MG tablet Take 25 mg by mouth 2 times daily      mometasone (ASMANEX) 200 MCG/ACT AERO inhaler Inhale 2 puffs into the lungs 2 times daily      montelukast (SINGULAIR) 10 MG tablet Take 10 mg by mouth nightly      nicotine (NICODERM CQ) 21 MG/24HR Place 1 patch onto the skin every 24 hours      predniSONE (DELTASONE) 20 MG tablet Take 20 mg by mouth See Admin Instructions 20 mg through 10/21/22, then decrease to 10 mg daily through 11/11/22      pantoprazole (PROTONIX) 40 MG tablet Take 40 mg by mouth daily      oxyCODONE (ROXICODONE) 5 MG immediate release tablet Take 5 mg by mouth every 6 hours as needed for Pain.      tamsulosin (FLOMAX) 0.4 MG capsule Take 0.4 mg by mouth at bedtime           Scheduled Meds:  REM     Continuous Infusions:  REM    PRN Meds:REM    Allergies: Allergies   Allergen Reactions    Vancomycin      Unknown reaction       REVIEW OF SYSTEMS:   History obtained from unobtainable from patient due to mental status    Review of Systems  Unable to obtain due to patient condition     PHYSICAL EXAM:   Vitals: There were no vitals taken for this visit.     I/O: Intake/Output Summary (Last 24 hours) at 10/26/2022 1852  Last data filed at 10/26/2022 1755  Gross per 24 hour   Intake 2475.22 ml   Output 4740 ml   Net -2264.78 ml       I/O this shift:  In: 2170.2 [I.V.:1.2; NG/GT:369; IV Piggyback:1800]  Out: 5963 [Urine:2335]  I/O last 3 completed shifts: In: 2120.8 [NG/GT:485; IV Piggyback:1635.8]  Out: 1254 [Urine:4905; Emesis/NG output:430]    Physical Examination:     Physical Exam  Gen: Diaphoretic, unresponsive   Eyes: Pupils dilated   ENT: No discharge. Pharynx with ETT and NG  Neck: Trachea midline. No obvious mass. Resp: Diffuse wheezing bilaterally  CV: Tachycardic. No murmur or rub. GI: Distended, diminished BS  Skin: No rash, plethoric facies  Lymph: No cervical LAD. No supraclavicular LAD. M/S: No cyanosis. No clubbing. No joint deformity. Neuro: withdraws to pain in all four extremities, no cough, no gag  Ext:   trace edema    Recent Labs     10/25/22  0425 10/26/22  0415   PHART 7.293* 7.404   [id number] 75.4* 57.0*   PO2ART 79.4 77.7         DATA:   Labs:  CBC:   Recent Labs     10/24/22  0424 10/25/22  0325 10/26/22  0826   WBC 17.0* 11.6* 14.7*   HGB 9.3* 8.8* 9.2*   HCT 30.2* 28.6* 30.1*   * 445 474*         BMP:   Recent Labs     10/24/22  0424 10/25/22  0325 10/26/22  0826    133* 134*   K 4.2 4.7 5.2*   CL 97* 93* 94*   CO2 33* 31 31   BUN 21* 23* 20   CREATININE 0.6* 0.5* 0.6*   GLUCOSE 121* 124* 122*   PHOS 3.8 4.7 2.9       LFT's:   Recent Labs     10/24/22  0424 10/25/22  0325   AST 34 30   ALT 17 14   BILITOT 0.3 <0.2   ALKPHOS 45 43       Troponin: No results for input(s): TROPONINI in the last 72 hours. BNP:No results for input(s): BNP in the last 72 hours. ABGs:   Recent Labs     10/25/22  0425 10/26/22  0415   PHART 7.293* 7.404   [id number] 75.4* 57.0*   PO2ART 79.4 77.7       INR: No results for input(s): INR in the last 72 hours.     U/A:No results for input(s): NITRITE, COLORU, PHUR, LABCAST, [address], RBCUA, MUCUS, TRICHOMONAS, YEAST, BACTERIA, CLARITYU, SPECGRAV, LEUKOCYTESUR, UROBILINOGEN, BILIRUBINUR, BLOODU, GLUCOSEU, AMORPHOUS in the last 72 hours. Invalid input(s): Jamshid Samisha    No orders to display       ASSESSMENT AND PLAN:   Sammie Bone is a 36 y.o. male who presents to the emergency department via EMS in cardiac arrest. Found to have anoxic brain injury on MRI.  Noted to have seizure like activity at [facility]. Eugenia Clamp as a transfer for EEG to rule out seizures, family would like to continue aggressive care but per sister patient is limited x4 if cardiac arrest.     Cardiopulmonary arrest  Unclear triggering event, found down at nursing home for ~30 minutes before arriving at [address] [place] [address] and mechanically ventilated  - lopressor     Anoxic Encephalopathy   Seizure-like activity  10/25 MRI with significant concern for anoxic encephalopathy likely 2/2 cardiopulmonary arrest. EEG without seizure on 10/24  - neurology consulted  - cEEG   - ativan, versed prn   - 1g keppra IV BID   - precedex gtt    COPD  Respiratory failure  Gram negative pneumonia 2/2 stenotrophomonas per [facility] [address] [facility] [address]   - continue pulmocrit, duonebs   - continue lasix   - continue bactrim     Goals of care  - per sister Mary Severino, patient would NOT want life-saving measures in case of acute cardiac arrest in his current condition  - palliative consulted       Code Status:Limited  FEN: NPO  PPX:  Lovenox, pepcid  DISPO: ICU    This patient has been staffed and discussed with Dr. Brayton Saint  -----------------------------  Souleymane Carpenter MD, PGY-1  10/26/2022  6:52 PM

## 2022-10-26 NOTE — PLAN OF CARE
Problem: Pain  Goal: Verbalizes/displays adequate comfort level or baseline comfort level  10/26/2022 1839 by Maya Holland RN  Outcome: Progressing     Problem: Skin/Tissue Integrity  Goal: Absence of new skin breakdown  Description: 1. Monitor for areas of redness and/or skin breakdown  2. Assess vascular access sites hourly  3. Every 4-6 hours minimum:  Change oxygen saturation probe site  4. Every 4-6 hours:  If on nasal continuous positive airway pressure, respiratory therapy assess nares and determine need for appliance change or resting period.   10/26/2022 1839 by Maya Holland RN    Problem: Cardiovascular - Adult  Goal: Absence of cardiac dysrhythmias or at baseline  10/26/2022 1839 by Maya Holland RN  Outcome: Progressing    Problem: Nutrition Deficit:  Goal: Optimize nutritional status  10/26/2022 1839 by Maya Holland RN  Outcome: Progressing     Problem: Skin/Tissue Integrity - Adult  Goal: Incisions, wounds, or drain sites healing without S/S of infection  10/26/2022 1839 by Maya Holland RN  Outcome: Progressing

## 2022-10-26 NOTE — PROGRESS NOTES
Pt to MRI. Versed gtt started due to pt moving. Pt calmer on versed but breathing hard and head moving with each breath. MD Bailey Back aware and one time order Elvis for MRI. MRI complete and pt back to ICU. MD Ruthie Alvarado at bedside later when critical results of MRI where called to RN. MD Ruthie Alvarado aware of results. Family to bedside and MD Ruthie Alvarado updated sister Nkechi Salvador on results of MRI. Nkechi Madeleine states she is going to try to call Neoma Blood with pallitive care back tomorrow but she does work all day. States if she needs to take time of work to meet with drs she should be able to.

## 2022-10-26 NOTE — CARE COORDINATION
Chart Reviewed. GOAL:    Return to [address] bed and precert is needed for his particular insurance. Palliative Care is working with family on goals of care. Continues with Vent. Following for DC needs.   [place], [place]     Case Management   [phone]    10/26/2022  9:55 AM

## 2022-10-26 NOTE — PROGRESS NOTES
Nicom placed on patient per order. PLR performed. SVI delta 7.4% change noted. Communicated hemodynamic parameters and PLR results to MD Salgado. Further orders have not been received. Will continue to monitor patient with Nicom.       Electronically signed by Mariya Ward RN on 10/26/2022 at 10:22 AM I was present during the key portion of the procedure.

## 2022-10-26 NOTE — PLAN OF CARE
Problem: Discharge Planning  Goal: Discharge to home or other facility with appropriate resources  10/26/2022 1839 by Maya Holland RN  Outcome: Progressing  10/26/2022 1838 by Maya Holland RN  Outcome: Not Progressing  Flowsheets (Taken 10/26/2022 5191)  Discharge to home or other facility with appropriate resources:   Identify barriers to discharge with patient and caregiver   Arrange for needed discharge resources and transportation as appropriate   Identify discharge learning needs (meds, wound care, etc)   Arrange for interpreters to assist at discharge as needed     Problem: Pain  Goal: Verbalizes/displays adequate comfort level or baseline comfort level  10/26/2022 1839 by Maya Holland RN  Outcome: Progressing  10/26/2022 1838 by Maya Holland RN  Outcome: Not Progressing  Flowsheets  Taken 10/26/2022 1600  Verbalizes/displays adequate comfort level or baseline comfort level: Assess pain using appropriate pain scale  Taken 10/26/2022 1200  Verbalizes/displays adequate comfort level or baseline comfort level:   Assess pain using appropriate pain scale   Consider cultural and social influences on pain and pain management     Problem: Safety - Adult  Goal: Free from fall injury  10/26/2022 1839 by Maya Holland RN  Outcome: Progressing  10/26/2022 1838 by Maya Holland RN  Outcome: Not Progressing     Problem: ABCDS Injury Assessment  Goal: Absence of physical injury  10/26/2022 1839 by Maya Holland RN  Outcome: Progressing  10/26/2022 1838 by Maya Holland RN  Outcome: Not Progressing  Flowsheets (Taken 10/26/2022 1511)  Absence of Physical Injury: Implement safety measures based on patient assessment     Problem: Skin/Tissue Integrity  Goal: Absence of new skin breakdown  Description: 1. Monitor for areas of redness and/or skin breakdown  2. Assess vascular access sites hourly  3. Every 4-6 hours minimum:  Change oxygen saturation probe site  4.   Every 4-6 hours:  If on nasal continuous positive airway pressure, respiratory therapy assess nares and determine need for appliance change or resting period.   10/26/2022 1839 by Belgica Kerr RN  Outcome: Progressing  10/26/2022 1838 by Belgica Kerr RN  Outcome: Not Progressing     Problem: Cardiovascular - Adult  Goal: Maintains optimal cardiac output and hemodynamic stability  10/26/2022 1839 by Belgica Kerr RN  Outcome: Progressing  10/26/2022 1838 by Belgica Kerr RN  Outcome: Not Progressing  Flowsheets (Taken 10/26/2022 0810)  Maintains optimal cardiac output and hemodynamic stability:   Monitor blood pressure and heart rate   Monitor urine output and notify Licensed Independent Practitioner for values outside of normal range   Assess for signs of decreased cardiac output  Goal: Absence of cardiac dysrhythmias or at baseline  10/26/2022 1839 by Belgica Kerr RN  Outcome: Progressing  10/26/2022 1838 by Belgica Kerr RN  Outcome: Not Progressing  Flowsheets (Taken 10/26/2022 0810)  Absence of cardiac dysrhythmias or at baseline:   Monitor cardiac rate and rhythm   Assess for signs of decreased cardiac output     Problem: Respiratory - Adult  Goal: Achieves optimal ventilation and oxygenation  10/26/2022 1839 by Belgica Kerr RN  Outcome: Progressing  10/26/2022 1838 by Belgica Kerr RN  Outcome: Not Progressing  Flowsheets (Taken 10/26/2022 0810 by Jamal Mason RN)  Achieves optimal ventilation and oxygenation:   Assess for changes in respiratory status   Assess for changes in mentation and behavior   Oxygen supplementation based on oxygen saturation or arterial blood gases   Position to facilitate oxygenation and minimize respiratory effort   Assess the need for suctioning and aspirate as needed   Respiratory therapy support as indicated     Problem: Gastrointestinal - Adult  Goal: Minimal or absence of nausea and vomiting  10/26/2022 1839 by Belgica Kerr RN  Outcome: Progressing  10/26/2022 1838 by Belgica Kerr RN  Outcome: Not Progressing  Flowsheets (Taken 10/26/2022 0810)  Minimal or absence of nausea and vomiting:   Administer IV fluids as ordered to ensure adequate hydration   Maintain NPO status until nausea and vomiting are resolved   Nasogastric tube to low intermittent suction as ordered   Administer ordered antiemetic medications as needed   Provide nonpharmacologic comfort measures as appropriate   Advance diet as tolerated, if ordered   Nutrition consult to assist patient with adequate nutrition and appropriate food choices  Goal: Maintains or returns to baseline bowel function  10/26/2022 1839 by Teddie Skiff, RN  Outcome: Progressing  10/26/2022 1838 by Teddie Skiff, RN  Outcome: Not Progressing  Flowsheets (Taken 10/26/2022 0810)  Maintains or returns to baseline bowel function:   Assess bowel function   Encourage oral fluids to ensure adequate hydration   Administer IV fluids as ordered to ensure adequate hydration   Administer ordered medications as needed   Encourage mobilization and activity   Nutrition consult to assist patient with appropriate food choices  Goal: Maintains adequate nutritional intake  10/26/2022 1839 by Teddie Skiff, RN  Outcome: Progressing  10/26/2022 1838 by Teddie Skiff, RN  Outcome: Not Progressing  Flowsheets (Taken 10/26/2022 0810)  Maintains adequate nutritional intake:   Identify factors contributing to decreased intake, treat as appropriate   Monitor intake and output, weight and lab values   Obtain nutritional consult as needed  Goal: Establish and maintain optimal ostomy function  10/26/2022 1839 by Teddie Skiff, RN  Outcome: Progressing  10/26/2022 1838 by Teddie Skiff, RN  Outcome: Not Progressing  Flowsheets (Taken 10/26/2022 0810)  Establish and maintain optimal ostomy function:   Nutrition consult   Administer IV fluids and TPN as ordered   Introduce and advance enteral feedings as ordered     Problem: Metabolic/Fluid and Electrolytes - Adult  Goal: Electrolytes maintained within normal limits  10/26/2022 1839 by Russell Osorio RN  Outcome: Progressing  10/26/2022 1838 by Russell Osorio RN  Outcome: Not Progressing  Flowsheets (Taken 10/26/2022 9004)  Electrolytes maintained within normal limits:   Monitor labs and assess patient for signs and symptoms of electrolyte imbalances   Administer electrolyte replacement as ordered   Monitor response to electrolyte replacements, including repeat lab results as appropriate  Goal: Hemodynamic stability and optimal renal function maintained  10/26/2022 1839 by Russell Osorio RN  Outcome: Progressing  10/26/2022 1838 by Russell Osorio RN  Outcome: Not Progressing  Flowsheets (Taken 10/26/2022 0810)  Hemodynamic stability and optimal renal function maintained:   Monitor intake, output and patient weight   Monitor labs and assess for signs and symptoms of volume excess or deficit   Monitor urine specific gravity, serum osmolarity and serum sodium as indicated or ordered   Monitor response to interventions for patient's volume status, including labs, urine output, blood pressure (other measures as available)  Goal: Glucose maintained within prescribed range  10/26/2022 1839 by Russell Osorio RN  Outcome: Progressing  10/26/2022 1838 by Russell Osorio RN  Outcome: Not Progressing  Flowsheets (Taken 10/26/2022 0810)  Glucose maintained within prescribed range:   Monitor blood glucose as ordered   Assess for signs and symptoms of hyperglycemia and hypoglycemia   Administer ordered medications to maintain glucose within target range   Instruct patient on self management of diabetes and initiate consult as needed   Assess barriers to adequate nutritional intake and initiate nutrition consult as needed     Problem: Skin/Tissue Integrity - Adult  Goal: Skin integrity remains intact  10/26/2022 1839 by Russell Osorio RN  Outcome: Progressing  10/26/2022 1838 by Russell Osorio RN  Outcome: Not Progressing  Flowsheets  Taken 10/26/2022 1511  Skin Integrity Remains Intact:   Monitor for areas of redness and/or skin breakdown   Assess vascular access sites hourly  Taken 10/26/2022 0810  Skin Integrity Remains Intact:   Monitor for areas of redness and/or skin breakdown   Assess vascular access sites hourly   Every 4-6 hours minimum: Change oxygen saturation probe site  Goal: Incisions, wounds, or drain sites healing without S/S of infection  10/26/2022 1839 by Joe Vance RN  Outcome: Progressing  10/26/2022 1838 by Joe Vance RN  Outcome: Not Progressing  Flowsheets  Taken 10/26/2022 1511  Incisions, Wounds, or Drain Sites Healing Without Sign and Symptoms of Infection:   Implement wound care per orders   Initiate isolation precautions as appropriate   Initiate pressure ulcer prevention bundle as indicated  Taken 10/26/2022 0810  Incisions, Wounds, or Drain Sites Healing Without Sign and Symptoms of Infection:   Initiate isolation precautions as appropriate   Initiate pressure ulcer prevention bundle as indicated  Goal: Oral mucous membranes remain intact  10/26/2022 1839 by Joe Vance RN  Outcome: Progressing  10/26/2022 1838 by Joe Vance RN  Outcome: Not Progressing  Flowsheets  Taken 10/26/2022 1511  Oral Mucous Membranes Remain Intact:   Assess oral mucosa and hygiene practices   Implement preventative oral hygiene regimen   Implement oral medicated treatments as ordered  Taken 10/26/2022 0810  Oral Mucous Membranes Remain Intact:   Assess oral mucosa and hygiene practices   Implement preventative oral hygiene regimen   Implement oral medicated treatments as ordered     Problem: Nutrition Deficit:  Goal: Optimize nutritional status  10/26/2022 1839 by Joe Vance RN  Outcome: Progressing  10/26/2022 1838 by Joe Vance RN  Outcome: Not Progressing

## 2022-10-26 NOTE — PLAN OF CARE
Problem: Discharge Planning  Goal: Discharge to home or other facility with appropriate resources  Outcome: Not Progressing  Flowsheets (Taken 10/26/2022 0810)  Discharge to home or other facility with appropriate resources:   Identify barriers to discharge with patient and caregiver   Arrange for needed discharge resources and transportation as appropriate   Identify discharge learning needs (meds, wound care, etc)   Arrange for interpreters to assist at discharge as needed     Problem: Pain  Goal: Verbalizes/displays adequate comfort level or baseline comfort level  Outcome: Not Progressing  Flowsheets  Taken 10/26/2022 1600  Verbalizes/displays adequate comfort level or baseline comfort level: Assess pain using appropriate pain scale  Taken 10/26/2022 1200  Verbalizes/displays adequate comfort level or baseline comfort level:   Assess pain using appropriate pain scale   Consider cultural and social influences on pain and pain management     Problem: Safety - Adult  Goal: Free from fall injury  Outcome: Not Progressing     Problem: ABCDS Injury Assessment  Goal: Absence of physical injury  Outcome: Not Progressing  Flowsheets (Taken 10/26/2022 1511)  Absence of Physical Injury: Implement safety measures based on patient assessment     Problem: Skin/Tissue Integrity  Goal: Absence of new skin breakdown  Description: 1. Monitor for areas of redness and/or skin breakdown  2. Assess vascular access sites hourly  3. Every 4-6 hours minimum:  Change oxygen saturation probe site  4. Every 4-6 hours:  If on nasal continuous positive airway pressure, respiratory therapy assess nares and determine need for appliance change or resting period.   Outcome: Not Progressing     Problem: Cardiovascular - Adult  Goal: Maintains optimal cardiac output and hemodynamic stability  Outcome: Not Progressing  Flowsheets (Taken 10/26/2022 0810)  Maintains optimal cardiac output and hemodynamic stability:   Monitor blood pressure and heart rate   Monitor urine output and notify Licensed Independent Practitioner for values outside of normal range   Assess for signs of decreased cardiac output  Goal: Absence of cardiac dysrhythmias or at baseline  Outcome: Not Progressing  Flowsheets (Taken 10/26/2022 0810)  Absence of cardiac dysrhythmias or at baseline:   Monitor cardiac rate and rhythm   Assess for signs of decreased cardiac output     Problem: Respiratory - Adult  Goal: Achieves optimal ventilation and oxygenation  Outcome: Not Progressing  Flowsheets (Taken 10/26/2022 0810 by Jody Aceves RN)  Achieves optimal ventilation and oxygenation:   Assess for changes in respiratory status   Assess for changes in mentation and behavior   Oxygen supplementation based on oxygen saturation or arterial blood gases   Position to facilitate oxygenation and minimize respiratory effort   Assess the need for suctioning and aspirate as needed   Respiratory therapy support as indicated     Problem: Gastrointestinal - Adult  Goal: Minimal or absence of nausea and vomiting  Outcome: Not Progressing  Flowsheets (Taken 10/26/2022 0810)  Minimal or absence of nausea and vomiting:   Administer IV fluids as ordered to ensure adequate hydration   Maintain NPO status until nausea and vomiting are resolved   Nasogastric tube to low intermittent suction as ordered   Administer ordered antiemetic medications as needed   Provide nonpharmacologic comfort measures as appropriate   Advance diet as tolerated, if ordered   Nutrition consult to assist patient with adequate nutrition and appropriate food choices  Goal: Maintains or returns to baseline bowel function  Outcome: Not Progressing  Flowsheets (Taken 10/26/2022 0810)  Maintains or returns to baseline bowel function:   Assess bowel function   Encourage oral fluids to ensure adequate hydration   Administer IV fluids as ordered to ensure adequate hydration   Administer ordered medications as needed   Encourage mobilization and activity   Nutrition consult to assist patient with appropriate food choices  Goal: Maintains adequate nutritional intake  Outcome: Not Progressing  Flowsheets (Taken 10/26/2022 0810)  Maintains adequate nutritional intake:   Identify factors contributing to decreased intake, treat as appropriate   Monitor intake and output, weight and lab values   Obtain nutritional consult as needed  Goal: Establish and maintain optimal ostomy function  Outcome: Not Progressing  Flowsheets (Taken 10/26/2022 0810)  Establish and maintain optimal ostomy function:   Nutrition consult   Administer IV fluids and TPN as ordered   Introduce and advance enteral feedings as ordered     Problem: Metabolic/Fluid and Electrolytes - Adult  Goal: Electrolytes maintained within normal limits  Outcome: Not Progressing  Flowsheets (Taken 10/26/2022 0810)  Electrolytes maintained within normal limits:   Monitor labs and assess patient for signs and symptoms of electrolyte imbalances   Administer electrolyte replacement as ordered   Monitor response to electrolyte replacements, including repeat lab results as appropriate  Goal: Hemodynamic stability and optimal renal function maintained  Outcome: Not Progressing  Flowsheets (Taken 10/26/2022 0810)  Hemodynamic stability and optimal renal function maintained:   Monitor intake, output and patient weight   Monitor labs and assess for signs and symptoms of volume excess or deficit   Monitor urine specific gravity, serum osmolarity and serum sodium as indicated or ordered   Monitor response to interventions for patient's volume status, including labs, urine output, blood pressure (other measures as available)  Goal: Glucose maintained within prescribed range  Outcome: Not Progressing  Flowsheets (Taken 10/26/2022 0810)  Glucose maintained within prescribed range:   Monitor blood glucose as ordered   Assess for signs and symptoms of hyperglycemia and hypoglycemia   Administer ordered medications to maintain glucose within target range   Instruct patient on self management of diabetes and initiate consult as needed   Assess barriers to adequate nutritional intake and initiate nutrition consult as needed     Problem: Skin/Tissue Integrity - Adult  Goal: Skin integrity remains intact  Outcome: Not Progressing  Flowsheets  Taken 10/26/2022 1511  Skin Integrity Remains Intact:   Monitor for areas of redness and/or skin breakdown   Assess vascular access sites hourly  Taken 10/26/2022 0810  Skin Integrity Remains Intact:   Monitor for areas of redness and/or skin breakdown   Assess vascular access sites hourly   Every 4-6 hours minimum: Change oxygen saturation probe site  Goal: Incisions, wounds, or drain sites healing without S/S of infection  Outcome: Not Progressing  Flowsheets  Taken 10/26/2022 1511  Incisions, Wounds, or Drain Sites Healing Without Sign and Symptoms of Infection:   Implement wound care per orders   Initiate isolation precautions as appropriate   Initiate pressure ulcer prevention bundle as indicated  Taken 10/26/2022 0810  Incisions, Wounds, or Drain Sites Healing Without Sign and Symptoms of Infection:   Initiate isolation precautions as appropriate   Initiate pressure ulcer prevention bundle as indicated  Goal: Oral mucous membranes remain intact  Outcome: Not Progressing  Flowsheets  Taken 10/26/2022 1511  Oral Mucous Membranes Remain Intact:   Assess oral mucosa and hygiene practices   Implement preventative oral hygiene regimen   Implement oral medicated treatments as ordered  Taken 10/26/2022 0810  Oral Mucous Membranes Remain Intact:   Assess oral mucosa and hygiene practices   Implement preventative oral hygiene regimen   Implement oral medicated treatments as ordered     Problem: Nutrition Deficit:  Goal: Optimize nutritional status  Outcome: Not Progressing

## 2022-10-26 NOTE — DISCHARGE SUMMARY
Hospital Medicine Discharge Summary    Patient ID: Emily Lopez      Patient's PCP: Violet Solomon DO    Admit Date: 10/20/2022     Discharge Date:   10/26/2022    Admitting Physician: Naveed Varghese MD     Discharge Physician: Aurora Dial MD     Discharge Diagnoses: Active Hospital Problems    Diagnosis Date Noted    Chronic respiratory failure with hypoxia and hypercapnia (HCC) [J96.11, J96.12] 10/24/2022     Priority: Medium    Gram-negative pneumonia ([facility] [address] [J15.6] 10/24/2022     Priority: Medium    Anoxic encephalopathy ([facility] [address] [G93.1] 10/24/2022     Priority: Medium    Cardiopulmonary arrest [facility]) [I46.9] 10/20/2022     Priority: Medium       The patient was seen and examined on day of discharge and this discharge summary is in conjunction with any daily progress note from day of discharge. Hospital Course: Transferred to [facility]  Accepting physician Dr. Brenden Cortez to neurology at [facility], they will see in consult  Updated Dr Mathew Singleton, intensivist on call  Patient admitted 5 days ago to [facility]"" AT [place] after an out of hospital cardiac arrest.  He is currently on vent support. Recovery postarrest been after holding sedation has not progressed. MRI concerning for underlying anoxic brain injury. There is also concern for possible underlying nonepileptic seizures given noted seizure activity on 10/24/2022. After discussion with patient's family and neurology patient is being transferred to [facility] for continuous EEG to further assess for any ongoing seizure activity. Family is aware that prognosis is poor given the findings on MRI.   Patient is medically stable for transfer to [facility]          Exam:     BP (!) 145/82   Pulse (!) 106   Temp 99.2 °F (37.3 °C) (Oral)   Resp 27   Ht 5' 6\" (1.676 m)   Wt 149 lb 11.1 oz (67.9 kg)   SpO2 91%   BMI 24.16 kg/m²     General appearance: No apparent distress, appears stated age  On vent (DUONEB) 0.5-2.5 (3) MG/3ML SOLN nebulizer solution Inhale 1 vial into the lungs every 6 hours as needed for Shortness of Breath      pregabalin (LYRICA) 50 MG capsule Take 50 mg by mouth 3 times daily. metoprolol tartrate (LOPRESSOR) 25 MG tablet Take 25 mg by mouth 2 times daily      mometasone (ASMANEX) 200 MCG/ACT AERO inhaler Inhale 2 puffs into the lungs 2 times daily      montelukast (SINGULAIR) 10 MG tablet Take 10 mg by mouth nightly      nicotine (NICODERM CQ) 21 MG/24HR Place 1 patch onto the skin every 24 hours      predniSONE (DELTASONE) 20 MG tablet Take 20 mg by mouth See Admin Instructions 20 mg through 10/21/22, then decrease to 10 mg daily through 11/11/22      pantoprazole (PROTONIX) 40 MG tablet Take 40 mg by mouth daily      oxyCODONE (ROXICODONE) 5 MG immediate release tablet Take 5 mg by mouth every 6 hours as needed for Pain.      tamsulosin (FLOMAX) 0.4 MG capsule Take 0.4 mg by mouth at bedtime             Time Spent on discharge is more than 45 minutes in the examination, evaluation, counseling and review of medications and discharge plan. Signed:    Caitlyn Rodriguez MD   10/26/2022      Thank you Jenny Harper DO for the opportunity to be involved in this patient's care. If you have any questions or concerns please feel free to contact me at [phone].

## 2022-10-26 NOTE — PROGRESS NOTES
Neurology Progress Note    Updates  No significant events overnight. No neurologic improvement.       Medical History:  Anxiety  Depression  GERD  Smoker    Current Facility-Administered Medications:   LORazepam (ATIVAN) injection 2 mg, 2 mg, IntraVENous, Q1H PRN  levETIRAcetam (KEPPRA) 1000 mg/100 mL IVPB, 1,000 mg, IntraVENous, Q12H  sulfamethoxazole-trimethoprim (BACTRIM) 352 mg in dextrose 5 % 500 mL IVPB, 5 mg/kg, IntraVENous, Q8H  midazolam (VERSED) 1 mg/mL in NS infusion, 1-10 mg/hr, IntraVENous, Continuous  furosemide (LASIX) injection 20 mg, 20 mg, IntraVENous, BID  insulin lispro (HUMALOG) injection vial 0-8 Units, 0-8 Units, SubCUTAneous, Q4H  methylPREDNISolone sodium (SOLU-MEDROL) injection 40 mg, 40 mg, IntraVENous, Q12H  metoprolol tartrate (LOPRESSOR) tablet 25 mg, 25 mg, Oral, BID  hydrALAZINE (APRESOLINE) injection 5 mg, 5 mg, IntraVENous, Q6H PRN  sodium phosphate 12.6 mmol in sodium chloride 0.9 % 250 mL IVPB, 0.16 mmol/kg, IntraVENous, PRN **OR** sodium phosphate 25.17 mmol in sodium chloride 0.9 % 250 mL IVPB, 0.32 mmol/kg, IntraVENous, PRN  docusate (COLACE) 50 MG/5ML liquid 100 mg, 100 mg, Oral, Daily  senna (SENOKOT) 176 MG/5ML syrup 10 mL, 10 mL, Oral, Nightly  polyethylene glycol (GLYCOLAX) packet 17 g, 17 g, Oral, Daily  budesonide (PULMICORT) nebulizer suspension 1,000 mcg, 1 mg, Nebulization, BID  lactobacillus (CULTURELLE) capsule 2 capsule, 2 capsule, Oral, BID WC  famotidine (PEPCID) 20 mg in sodium chloride (PF) 10 mL injection, 20 mg, IntraVENous, BID  sodium chloride flush 0.9 % injection 5-40 mL, 5-40 mL, IntraVENous, 2 times per day  sodium chloride flush 0.9 % injection 5-40 mL, 5-40 mL, IntraVENous, PRN  0.9 % sodium chloride infusion, , IntraVENous, PRN  polyethylene glycol (GLYCOLAX) packet 17 g, 17 g, Oral, Daily PRN  acetaminophen (TYLENOL) tablet 650 mg, 650 mg, Oral, Q6H PRN **OR** acetaminophen (TYLENOL) suppository 650 mg, 650 mg, Rectal, Q6H PRN  apixaban (ELIQUIS) tablet 5 mg, 5 mg, Oral, BID  [Held by provider] buPROPion (WELLBUTRIN XL) extended release tablet 300 mg, 300 mg, Oral, QAM  pregabalin (LYRICA) capsule 50 mg, 50 mg, Oral, TID  [Held by provider] tamsulosin (FLOMAX) capsule 0.4 mg, 0.4 mg, Oral, Nightly  glucose chewable tablet 16 g, 4 tablet, Oral, PRN  dextrose bolus 10% 125 mL, 125 mL, IntraVENous, PRN **OR** dextrose bolus 10% 250 mL, 250 mL, IntraVENous, PRN  glucagon (rDNA) injection 1 mg, 1 mg, SubCUTAneous, PRN  dextrose 10 % infusion, , IntraVENous, Continuous PRN  ipratropium-albuterol (DUONEB) nebulizer solution 1 ampule, 1 ampule, Inhalation, Q4H    Medications Prior to Admission:   acetaminophen (TYLENOL) 500 MG tablet, Take 1,000 mg by mouth every 6 hours as needed for Pain or Fever  LORazepam (ATIVAN) 0.5 MG tablet, Take 0.5 mg by mouth every 8 hours as needed for Anxiety. azithromycin (ZITHROMAX) 250 MG tablet, Take 250 mg by mouth three times a week Mon/Wed/Fri  buPROPion (WELLBUTRIN XL) 300 MG extended release tablet, Take 300 mg by mouth every morning  apixaban (ELIQUIS) 5 MG TABS tablet, Take 5 mg by mouth 2 times daily  hydrOXYzine HCl (ATARAX) 25 MG tablet, Take 25 mg by mouth in the morning, at noon, and at bedtime  ipratropium-albuterol (DUONEB) 0.5-2.5 (3) MG/3ML SOLN nebulizer solution, Inhale 1 vial into the lungs every 6 hours as needed for Shortness of Breath  pregabalin (LYRICA) 50 MG capsule, Take 50 mg by mouth 3 times daily.   metoprolol tartrate (LOPRESSOR) 25 MG tablet, Take 25 mg by mouth 2 times daily  mometasone (ASMANEX) 200 MCG/ACT AERO inhaler, Inhale 2 puffs into the lungs 2 times daily  montelukast (SINGULAIR) 10 MG tablet, Take 10 mg by mouth nightly  nicotine (NICODERM CQ) 21 MG/24HR, Place 1 patch onto the skin every 24 hours  predniSONE (DELTASONE) 20 MG tablet, Take 20 mg by mouth See Admin Instructions 20 mg through 10/21/22, then decrease to 10 mg daily through 11/11/22  pantoprazole (PROTONIX) 40 MG tablet, Take 40 mg by mouth daily  oxyCODONE (ROXICODONE) 5 MG immediate release tablet, Take 5 mg by mouth every 6 hours as needed for Pain.  tamsulosin (FLOMAX) 0.4 MG capsule, Take 0.4 mg by mouth at bedtime    Allergies   Allergen Reactions    Vancomycin      Unknown reaction     ROS - afebrile. Chart reviewed. Exam  Blood pressure (!) 161/84, pulse (!) 136, temperature 99.8 °F (37.7 °C), temperature source Axillary, resp. rate 28, height 5' 6\" (1.676 m), weight 149 lb 11.1 oz (67.9 kg), SpO2 92 %. Constitutional    Vital signs: BP, HR, and RR reviewed   General depressed mental status, intubated. Eyes: unable to visualize the fundi. Cardiovascular: no peripheral edema. Psychiatric: limited exam given encephalopathy   Neurologic  Mental status: limited exam given encephalopathy  orientation goyo due to encephalopathy  Attention poor  Language limited exam given encephalopathy  Comprehension not following any commands  CN2: was unable to obtain a corneal reflex. CN 3,4,6: gaze is conjugate. No forced deviation or preference. Pupils are 6 mm bilaterally, reactive. CN7: face symmetric but exam limited by ET tube  CN8: hearing limited exam given encephalopathy  CN9: RN was unable to obtain a gag reflex. CN11: limited exam given encephalopathy  CN12: limited exam given encephalopathy  Strength: goyo due to encephalopathy  Sensory: he does appear to withdraw to noxious stimulus in all 4 limbs. Cerebellar/coordination:limited exam given encephalopathy  Tone: no increased tone or rigidity. Gait: limited exam given encephalopathy and intubated with ventilator. Labs  Glucose 122  Na 134  K 5.2  Cl 94  BUN 20  Cr 0.6  Mg 2.30    ALT 14  AST 30    WBC 14.7K  Hg 9.2  Platelets 685    + respiratory culture    Studies  MRI brain w/o 10/25/22, independently reviewed  Impression   Known diffuse bilateral Faye rolandic and occipital cytotoxic edema.    Differential considerations include cerebritis, anoxic encephalopathy,   posterior reversible encephalopathy syndrome, ex cetera. Pansinusitis. EEG 10/24/22  Moderately severe background slowing and disorganization along with some superimposed bihemispheric suppression. Impression/Recommendations  Anoxic brain injury secondary to prolonged cardiopulmonary arrest.  Seizure like activity. ?hx of seizures. Pneumonia. Recommendations  Keppra 1000 mg BID. All things considered, long term neurologic prognosis is concerning. If family wanted to continue to be aggressive and definitively r/o subclinical seizure activity then the patient would need to be transferred to a facility w/ continuous EEG capabilities.       Discussed w/ RNs at the bedside and hospitalist.     Hank Vasquez NP  [address] [place] [address] [id number] Neurology    A copy of this note was provided for Dr Humera Bonner MD

## 2022-10-26 NOTE — PROGRESS NOTES
Pulmonary Progress Note    CC:  Follow up respiratory failure, cardiac arrest    Subjective:  MRI showed anoxic injury   Having bouts of tachycardia and tachypnea that improves with ativan      Intake/Output Summary (Last 24 hours) at 10/26/2022 0825  Last data filed at 10/26/2022 0823  Gross per 24 hour   Intake 305 ml   Output 4410 ml   Net -4105 ml           PHYSICAL EXAM:  Blood pressure 111/71, pulse (!) 128, temperature 99.8 °F (37.7 °C), temperature source Axillary, resp. rate 29, height 5' 6\" (1.676 m), weight 115 lb 4.8 oz (52.3 kg), SpO2 (!) 85 %.'  Gen: Chronically ill, unkempt, looks older than listed age   Eyes: Pupils dilated   ENT: No discharge. Pharynx with ETT and NG  Neck: Trachea midline. No obvious mass. Resp: Still wheezing, might be less than yesterday   CV: Tachy No murmur or rub. GI: Firm with exhalation, diminished BS  Skin: Erythematous in areas, plethoric facie  Lymph: No cervical LAD. No supraclavicular LAD. M/S: No cyanosis. No clubbing. No joint deformity.     Neuro: withdraws to pain in all four extremities  Ext:   trace edema    Medications:    Scheduled Meds:   levETIRAcetam  1,000 mg IntraVENous Q12H    sulfamethoxazole-trimethoprim (BACTRIM) IVPB  5 mg/kg IntraVENous Q8H    furosemide  20 mg IntraVENous BID    insulin lispro  0-8 Units SubCUTAneous Q4H    methylPREDNISolone  40 mg IntraVENous Q12H    metoprolol tartrate  25 mg Oral BID    docusate  100 mg Oral Daily    senna  10 mL Oral Nightly    polyethylene glycol  17 g Oral Daily    budesonide  1 mg Nebulization BID    lactobacillus  2 capsule Oral BID WC    famotidine (PEPCID) injection  20 mg IntraVENous BID    sodium chloride flush  5-40 mL IntraVENous 2 times per day    apixaban  5 mg Oral BID    [Held by provider] buPROPion  300 mg Oral QAM    pregabalin  50 mg Oral TID    [Held by provider] tamsulosin  0.4 mg Oral Nightly    ipratropium-albuterol  1 ampule Inhalation Q4H       Continuous Infusions:   midazolam Stopped (10/25/22 1836)    sodium chloride      dextrose         PRN Meds:  LORazepam, hydrALAZINE, sodium phosphate IVPB **OR** sodium phosphate IVPB, sodium chloride flush, sodium chloride, polyethylene glycol, acetaminophen **OR** acetaminophen, glucose, dextrose bolus **OR** dextrose bolus, glucagon (rDNA), dextrose    Labs:  CBC:   Recent Labs     10/24/22  0424 10/25/22  0325   WBC 17.0* 11.6*   HGB 9.3* 8.8*   HCT 30.2* 28.6*   MCV 81.2 80.6   * 445       BMP:   Recent Labs     10/24/22  0424 10/25/22  0325    133*   K 4.2 4.7   CL 97* 93*   CO2 33* 31   PHOS 3.8 4.7   BUN 21* 23*   CREATININE 0.6* 0.5*       LIVER PROFILE:   Recent Labs     10/24/22  0424 10/25/22  0325   AST 34 30   ALT 17 14   BILITOT 0.3 <0.2   ALKPHOS 45 43       PT/INR: No results for input(s): PROTIME, INR in the last 72 hours. APTT: No results for input(s): APTT in the last 72 hours. UA:No results for input(s): NITRITE, COLORU, PHUR, LABCAST, WBCUA, RBCUA, MUCUS, TRICHOMONAS, YEAST, BACTERIA, CLARITYU, SPECGRAV, LEUKOCYTESUR, UROBILINOGEN, BILIRUBINUR, BLOODU, GLUCOSEU, AMORPHOUS in the last 72 hours. Invalid input(s): Esaw Speck  No results for input(s): PH, PCO2, PO2 in the last 72 hours. Films:  Chest imaging reports were reviewed and imaging was reviewed by me and shows ETT, Gastric tube in place. Right sided airspace disease and effusion. Improved right sided airspace disease     AB.    Cultures:  BAL:  stenotrophomonas     I reviewed the labs and images listed above    Assessment/Plan:   Cardiopulmonary Arrest, PEA at nursing home   PS mode   Continue to hold continuous sedation. PRN ativan.     Daily ABG  Titrate oxygen for saturations greater than or equal to 90%  Chronic Hypoxic/Hypercapnic Respiratory Failure  PS mode  Daily ABG  Gram negative pneumonia due to stenotrophomonas   Bactrim for 7 days  Lactobacillus   Anoxic Encephalopathy   EEG without signs of seizure but has been responding to ativan. Continue with ativan prn. Ativan infusion if requirements are high  MRI with anoxic brain injury  COPD   Pulmicort q 12   Duonebs q 4 hours  Solumedrol   Bilateral pleural effusions  Lasix 20 mg IV BID   Myoclonus   EEG without seizure on 10/24  MRI with anoxic brain injury   Appeared to have an active seizure this am (NEW)  Keppra BID  PRN ativan     Evidence of anoxic brain injury, thus permanent damage. Since already a limited CODE, comfort measures/hospice is appropriate    GI prophylaxis  Pepcid  DVT prophylaxis  Eliquis     Critical care time of 31 minutes. This does not include procedural time. Please see procedural notes for details.     Yovany Saba DO  [facility] Pulmonary

## 2022-10-27 PROBLEM — J18.9 PNEUMONIA: Status: ACTIVE | Noted: 2022-01-01

## 2022-10-27 PROBLEM — J96.01 ACUTE RESPIRATORY FAILURE WITH HYPOXIA (HCC): Status: ACTIVE | Noted: 2022-01-01

## 2022-10-27 PROBLEM — R56.9 SEIZURE-LIKE ACTIVITY (HCC): Status: ACTIVE | Noted: 2022-01-01

## 2022-10-27 NOTE — PROGRESS NOTES
ICU Progress Note    Admit Date: 10/26/2022  Day: 7  Vent Day: None  IV Access:Peripheral  IV Fluids:None  Vasopressors:None                Antibiotics: None  Diet: Diet NPO    CC: Cardiopulmonary arrest    Interval history: No interval events. HPI: Per  ED: Jamal Davis is a 36 y.o. male who transferred to [facility] from [facility]. Per chart review, the pt presented to [address] [place] [address],[address] ED via EMS in cardiac arrest.  \"History from EMS is that this patient was at a care facility. He has a history of severe COPD, with a history of noncompliance with his oxygen. He was found down at the care facility. EMS was called. EMS reports that they gave 2 A of bicarb, 4 rounds of epinephrine and they also did CPR through [place] device. They intubated the patient with a [place] airway. They did obtain return of spontaneous circulation. Patient is unable to provide history and currently there is no family to provide any further history. \"     Per  DC Summ: Patient admitted 5 days ago to [facility] [place] after an out of hospital cardiac arrest.  He is currently on vent support. Recovery postarrest been after holding sedation has not progressed. MRI concerning for underlying anoxic brain injury. There is also concern for possible underlying nonepileptic seizures given noted seizure activity on 10/24/2022. After discussion with patient's family and neurology patient is being transferred to [facility]. for continuous EEG to further assess for any ongoing seizure activity. Family is aware that prognosis is poor given the findings on MRI. Patient is medically stable for transfer to [facility].     Currently, the patient is intubated and mechanically ventilated, unresponsive.         Medications:     Scheduled Meds:   sodium chloride flush  5-40 mL IntraVENous 2 times per day    levETIRAcetam  1,000 mg IntraVENous Q12H    famotidine (PEPCID) injection  20 mg IntraVENous BID    sodium chloride flush  5-40 mL IntraVENous 2 times per day    pregabalin  50 mg Oral TID    tamsulosin  0.4 mg Oral Nightly    ipratropium-albuterol  1 ampule Inhalation Q4H    docusate  100 mg Oral Daily    senna  1 tablet Oral Nightly    polyethylene glycol  17 g Oral Daily    budesonide  1 mg Nebulization BID    lactobacillus  2 capsule Oral BID WC    metoprolol tartrate  25 mg Oral BID    furosemide  20 mg IntraVENous BID    sulfamethoxazole-trimethoprim (BACTRIM) IVPB  5 mg/kg IntraVENous Q8H    enoxaparin  40 mg SubCUTAneous Daily     Continuous Infusions:   sodium chloride      [Held by provider] dexmedetomidine (PRECEDEX) IV infusion      sodium chloride      dextrose       PRN Meds:sodium chloride flush, sodium chloride, ondansetron **OR** ondansetron, sodium chloride flush, sodium chloride, acetaminophen **OR** acetaminophen, glucose, dextrose bolus **OR** dextrose bolus, glucagon (rDNA), dextrose, hydrALAZINE, midazolam, LORazepam    Objective:   Vitals:   T-max:  Patient Vitals for the past 8 hrs:   BP Temp Temp src Pulse Resp SpO2 Height Weight   10/27/22 0600 121/71 -- -- (!) 114 26 94 % -- --   10/27/22 0545 128/75 -- -- (!) 114 25 94 % -- --   10/27/22 0530 110/68 -- -- (!) 104 23 94 % -- --   10/27/22 0515 110/69 -- -- (!) 113 24 94 % -- --   10/27/22 0500 111/74 -- -- (!) 114 24 93 % -- --   10/27/22 0445 126/71 -- -- (!) 114 24 93 % -- --   10/27/22 0430 106/73 -- -- (!) 108 21 95 % -- --   10/27/22 0429 -- -- -- (!) 106 24 95 % -- --   10/27/22 0415 110/74 -- -- (!) 109 21 95 % -- --   10/27/22 0400 110/71 99.1 °F (37.3 °C) Axillary (!) 108 22 95 % 5' 6\" (1.676 m) 147 lb 14.9 oz (67.1 kg)   10/27/22 0345 116/69 -- -- (!) 106 21 95 % -- --   10/27/22 0330 124/78 -- -- (!) 106 22 95 % -- --   10/27/22 0315 129/79 -- -- (!) 105 23 97 % -- --   10/27/22 0300 98/65 -- -- 100 21 96 % -- --   10/27/22 0245 97/63 -- -- (!) 101 27 96 % -- --   10/27/22 0230 118/85 -- -- (!) 104 26 96 % -- --   10/27/22 0215 114/75 -- -- (!) 102 24 96 % -- -- 10/27/22 0200 (!) 153/90 -- -- (!) 105 25 95 % -- --   10/27/22 0145 117/79 -- -- 100 21 96 % -- --   10/27/22 0130 (!) 136/92 -- -- 100 17 97 % -- --   10/27/22 0115 110/75 -- -- 98 19 97 % -- --   10/27/22 0100 107/75 -- -- 97 19 97 % -- --   10/27/22 0045 (!) 133/90 -- -- 100 22 96 % -- --   10/27/22 0030 123/88 -- -- (!) 102 24 96 % -- --   10/27/22 0015 (!) 165/105 -- -- (!) 104 28 96 % -- --   10/27/22 0001 -- -- -- (!) 103 16 95 % -- --   10/27/22 0000 115/84 99.1 °F (37.3 °C) Axillary (!) 103 21 96 % -- --   10/26/22 2350 -- -- -- -- -- -- 5' 6\" (1.676 m) 147 lb 14.9 oz (67.1 kg)   10/26/22 2345 (!) 127/91 -- -- (!) 108 25 94 % -- --       Intake/Output Summary (Last 24 hours) at 10/27/2022 0734  Last data filed at 10/27/2022 8815  Gross per 24 hour   Intake 594.41 ml   Output 1500 ml   Net -905.59 ml       Review of Systems   Unable to perform ROS: Patient unresponsive     Physical Exam  Physical Exam  Gen: Diaphoretic, unresponsive   Eyes: Pupils dilated   ENT: No discharge. Pharynx with ETT and NG  Neck: Trachea midline. No obvious mass. Resp: Diffuse wheezing bilaterally  CV: Tachycardic. No murmur or rub. GI: Distended, diminished BS  Skin: No rash, plethoric facies  Lymph: No cervical LAD. No supraclavicular LAD. M/S: No cyanosis. No clubbing. No joint deformity.    Neuro: withdraws to pain in all four extremities, no cough, no gag  Ext:   trace edema    LABS:    CBC:   Recent Labs     10/25/22  0325 10/26/22  0826 10/27/22  0349   WBC 11.6* 14.7* 19.9*   HGB 8.8* 9.2* 9.4*   HCT 28.6* 30.1* 29.6*    474* 455*   MCV 80.6 81.3 79.9*     Renal:    Recent Labs     10/25/22  0325 10/26/22  0826 10/27/22  0349   * 134* 132*   K 4.7 5.2* 4.5   CL 93* 94* 92*   CO2 31 31 33*   BUN 23* 20 25*   CREATININE 0.5* 0.6* 0.7*   GLUCOSE 124* 122* 103*   CALCIUM 8.9 9.3 9.5   MG 2.20 2.30  --    PHOS 4.7 2.9  --    ANIONGAP 9 9 7     Hepatic:   Recent Labs     10/25/22  0325   AST 30   ALT 14 BILITOT <0.2   PROT 6.3*   LABALBU 3.6   ALKPHOS 43     Troponin: No results for input(s): TROPONINI in the last 72 hours. BNP: No results for input(s): BNP in the last 72 hours. Lipids: No results for input(s): CHOL, HDL in the last 72 hours. Invalid input(s): LDLCALCU, TRIGLYCERIDE  ABGs:    Recent Labs     10/25/22  0425 10/26/22  0415 10/27/22  0348   PHART 7.293* 7.404 7.448   LAF0XZG 75.4* 57.0* 52.8*   PO2ART 79.4 77.7 78.8   HHE8BZH 36.5* 35.6* 37*   BEART 8.0* 9.5* 10.7*   G2CCPHNA 94.1 96.2 97   OZT7NRX 38.8 37.3 38       INR: No results for input(s): INR in the last 72 hours. Lactate: No results for input(s): LACTATE in the last 72 hours. Cultures:  -----------------------------------------------------------------  RAD:   No orders to display         Assessment/Plan:   Mago Alba is a 36 y.o. male who presents to the emergency department via EMS in cardiac arrest. Found to have anoxic brain injury on MRI. Noted to have seizure like activity at [facility] as a transfer for EEG to rule out seizures, family would like to continue aggressive care but per sister patient is limited x4 if cardiac arrest.      Cardiopulmonary arrest  Unclear triggering event, found down at nursing home for ~30 minutes before arriving at [address] [place] [address] and mechanically ventilated  - lopressor     Anoxic Encephalopathy   Seizure-like activity  10/25 MRI with significant concern for anoxic encephalopathy likely 2/2 cardiopulmonary arrest. EEG without seizure on 10/24  - neurology consulted  - cEEG   - ativan, versed prn   - 1g keppra IV BID   - precedex gtt     COPD  Respiratory failure  Gram negative pneumonia 2/2 stenotrophomonas per [facility]. Bronchial breath sounds on right. CXR obtained with minimal change in consolidation.   - continue pulmocrit, duonebs   - continue lasix   - continue bactrim      Goals of care  - per sister Zohreh See, patient would NOT want life-saving measures in case of acute cardiac arrest in his current condition  - palliative consulted      Code Status:Limited  FEN: NPO  PPX:  Lovenox, pepcid  DISPO: ICU    Pamela Sparks MD, PGY-1  10/27/22  7:34 AM    This patient has been staffed and discussed with Dr. Lazaro Aponte. Patient examined, findings as discussed with Dr Carolyn Husain. Agree with assessment and plan. Patient is transferred to our neurointensive care unit for evaluation of possible nonconvulsive status epilepticus. He has had cardiac arrest with uncertain length of time sans resuscitation. Prior evaluation at OSH included MRI scan, with findings of severe anoxic encephalopathy. He has not been on any sedation for the past 4 days. He is unresponsive to pain, has no awareness of environment, on examination today. Physical examination and chest radiograph are consistent with R basilar pneumonia, and respiratory culture has grown stenotrophomonas. He is on Bactrim therapy for this lower respiratory infection. He has been afebrile at least 24 hours. He is maintained on mechanical ventilation with Ve 10-11 L, FiO2 70%. Arterial blood gas shows adequate ventilation and gas exchange. He exhibits ventilatory drive. He clearly has severe anoxic brain injury, status post cardiac arrest with uncertain time sans resuscitation. He has been placed on continuous EEG, and the initial interpretation indicates severe slowing with no evidence of seizure activity.

## 2022-10-27 NOTE — PROGRESS NOTES
4 Eyes Admission Assessment     I agree as the admission nurse that 2 RN's have performed a thorough Head to Toe Skin Assessment on the patient. ALL assessment sites listed below have been assessed on admission. Areas assessed by both nurses:   [x]   Head, Face, and Ears   [x]   Shoulders, Back, and Chest  [x]   Arms, Elbows, and Hands   [x]   Coccyx, Sacrum, and Ischium  [x]   Legs, Feet, and Heels        Does the Patient have Skin Breakdown?   No-Burn kris on chest, rash on back, abrasion posterior R leg        Trino Prevention initiated:  No   Wound Care Orders initiated:  No      [facility] nurse consulted for Pressure Injury (Stage 3,4, Unstageable, DTI, NWPT, and Complex wounds) or Trino score 18 or lower:  No      Nurse 1 eSignature: Electronically signed by Lisset Polanco RN on 10/26/22 at 11:22 PM EDT    **SHARE this note so that the co-signing nurse is able to place an eSignature**    Nurse 2 eSignature: {Esignature:[id number]}

## 2022-10-27 NOTE — PROGRESS NOTES
Pt admitted to 64 from [facility]. Intubated no sedation. Opens eyes to pain. Withdraws from pain in all extremities. Pupils +4 and sluggish. No cough, no gag reflex. All safety precautions in place.

## 2022-10-27 NOTE — PLAN OF CARE
Problem: Discharge Planning  Goal: Discharge to home or other facility with appropriate resources  10/27/2022 0719 by Feng Lopez RN  Outcome: Progressing  Flowsheets (Taken 10/27/2022 0400 by Marni Brewster RN)  Discharge to home or other facility with appropriate resources: Identify barriers to discharge with patient and caregiver  10/27/2022 0233 by Marni Brewster RN  Outcome: Progressing  [address] (Taken 10/26/2022 2115)  Discharge to home or other facility with appropriate resources: Identify barriers to discharge with patient and caregiver     Problem: Pain  Goal: Verbalizes/displays adequate comfort level or baseline comfort level  10/27/2022 0719 by Feng Lopez RN  Outcome: Progressing  Flowsheets (Taken 10/27/2022 0400 by Marni Brewster RN)  Verbalizes/displays adequate comfort level or baseline comfort level: Assess pain using appropriate pain scale  10/27/2022 0233 by Marni Brewster RN  Outcome: Progressing  Flowsheets  Taken 10/27/2022 0000  Verbalizes/displays adequate comfort level or baseline comfort level: Assess pain using appropriate pain scale  Taken 10/26/2022 2115  Verbalizes/displays adequate comfort level or baseline comfort level: Assess pain using appropriate pain scale     Problem: Chronic Conditions and Co-morbidities  Goal: Patient's chronic conditions and co-morbidity symptoms are monitored and maintained or improved  10/27/2022 0719 by Feng Lopez RN  Outcome: Progressing  10/27/2022 0233 by Marni Brewster RN  Outcome: Progressing     Problem: Skin/Tissue Integrity  Goal: Absence of new skin breakdown  Description: 1. Monitor for areas of redness and/or skin breakdown  2. Assess vascular access sites hourly  3. Every 4-6 hours minimum:  Change oxygen saturation probe site  4. Every 4-6 hours:  If on nasal continuous positive airway pressure, respiratory therapy assess nares and determine need for appliance change or resting period.   10/27/2022 0719 by Feng Lopez RN  Outcome: Progressing  10/27/2022 0233 by Clare Olivera RN  Outcome: Progressing

## 2022-10-27 NOTE — PLAN OF CARE
Problem: Discharge Planning  Goal: Discharge to home or other facility with appropriate resources  Outcome: Progressing  Flowsheets (Taken 10/26/2022 2115)  Discharge to home or other facility with appropriate resources: Identify barriers to discharge with patient and caregiver     Problem: Pain  Goal: Verbalizes/displays adequate comfort level or baseline comfort level  Outcome: Progressing  Flowsheets  Taken 10/27/2022 0000  Verbalizes/displays adequate comfort level or baseline comfort level: Assess pain using appropriate pain scale  Taken 10/26/2022 2115  Verbalizes/displays adequate comfort level or baseline comfort level: Assess pain using appropriate pain scale     Problem: Chronic Conditions and Co-morbidities  Goal: Patient's chronic conditions and co-morbidity symptoms are monitored and maintained or improved  Outcome: Progressing     Problem: Skin/Tissue Integrity  Goal: Absence of new skin breakdown  Description: 1. Monitor for areas of redness and/or skin breakdown  2. Assess vascular access sites hourly  3. Every 4-6 hours minimum:  Change oxygen saturation probe site  4. Every 4-6 hours:  If on nasal continuous positive airway pressure, respiratory therapy assess nares and determine need for appliance change or resting period.   Outcome: Progressing

## 2022-10-27 NOTE — PROGRESS NOTES
10/27/22 1102   Encounter Summary   Encounter Overview/Reason  Initial Encounter;Spiritual/Emotional Needs; Rituals, Rites and Sacraments   Service Provided For: Patient   Referral/Consult From: Rounding   Support System Unknown   Last Encounter  10/27/22  (sos,  cm)   Complexity of Encounter High   Begin Time 1030   End Time  1053   Total Time Calculated 23 min   Encounter    Type Initial Screen/Assessment   Spiritual/Emotional needs   Type Spiritual Support   Rituals, Rites and Sacraments   Type Sacrament of Sick; Anointing   Assessment/Intervention/Outcome   Assessment Unable to assess   Intervention Sustaining Presence/Ministry of presence;Guided Imagery, Healing touch, etc.   Outcome Comfort;Peace; Other (comment)   Electronically signed by Gladys Boyer on 10/27/2022 at 11:07 AM

## 2022-10-27 NOTE — PROGRESS NOTES
Pharmacist Review and Automatic Dose Adjustment of Prophylactic Enoxaparin    The reviewing pharmacist has made an adjustment to the ordered enoxaparin dose or converted to UFH per the approved [facility] protocol and table as defined below. Plan / Rationale: Based upon the patient's weight and renal function, the ordered dose of 30 mg daily has been converted to 40 mg daily. Prachi Barrera, PharmD, BCPS  10/26/2022, 9:59 PM      Km Rees is a 36 y.o. male. Recent Labs     10/24/22  0424 10/25/22  0325 10/26/22  0826   CREATININE 0.6* 0.5* 0.6*       Estimated Creatinine Clearance: 143 mL/min (A) (based on SCr of 0.6 mg/dL (L)). Recent Labs     10/25/22  0325 10/26/22  0826   HGB 8.8* 9.2*   HCT 28.6* 30.1*    474*     No results for input(s): INR in the last 72 hours.     Height:   Ht Readings from Last 1 Encounters:   10/24/22 5' 6\" (1.676 m)     Weight:  Wt Readings from Last 1 Encounters:   10/26/22 149 lb 11.1 oz (67.9 kg)

## 2022-10-27 NOTE — PROGRESS NOTES
Pt to be transferred to [facility]. Report called to ICU RN Paul Yang. All questions answered. [facility] center to be called by Veronica Ugalde to inform of transfer. All transfer forms filled out. Family aware of transfer.

## 2022-10-27 NOTE — PROCEDURES
Continuous EEG monitoring record    Patient name: Alejo Gastelum    START:  10/27/2022 @ 11:12am   END:  10/28/2022 @ 12:09pm       Electroencephalographer: Martine Shanks MD PhD      CLINICAL DETAILS:  This EEG was performed on this 36 y. o.yo male admitted for Altered mental Status and concer for subclinical seizures      TECHNICAL DETAILS:  Continuous video-EEG monitoring was performed with 27 surface scalp electrodes placed according to the International 10-20 electrode placement system, using a 32-channel [employer] headbox. All EEG and video information was acquired digitally, including the use of automated spike and seizure detection software to detect epileptiform activity. An event button was also available to be depressed during clinical events. 10/28/2022 00:00am to 12:09pm     SEIZURES:  None  INTERICTAL:  Frequent brief generalized periodic discharges (GPDs) at 0.5-1Hz that do not build up or evolve    PUSHBUTTONS:  None  BACKGROUND:  Abundant mixed delta/theta frequencies. EKG:  Regular    10/27/2022 11:12am - 23:59pm   SEIZURES:  None  INTERICTAL:  Frequent brief generalized periodic discharges (GPDs) at 0.5-1Hz that do not build up or evolve    PUSHBUTTONS:  None  BACKGROUND:  Abundant mixed delta/theta frequencies. EKG:  regular    CLINICAL INTERPRETATION:  This was an abnormal tracing for age and state due to a severe generalized slow wave abnormality indicating diffuse cerebral dysfunction which can be of multiple causes, including structural, or vascular abnormalities, toxic/metabolic conditions, hydrocephalus, or postictal conditions. GPDs lie along the ictal-interictal continuum which are of unclear significance but may be associated with seizures. No definite seizures were seen during this recording. Clinical correlation is advised.         Martine Shanks MD PhD

## 2022-10-27 NOTE — CONSULTS
Neurology / Neurocritical Care Consult Note    Nika Almonte DO is requesting this consult. Reason for Consult: hypoxic brain injury  Admission Chief Complaint: encephalopathy     History of Present Illness     Uyen Waterman is a 36 y.o. y/o male with PMH significant for COPD (home O2 dependence with non-compliance), depression who I am asked to see for anoxic encephalopathy. He is unable to provide history secondary to his comatose state. He is transferred from [facility] [place]. Prior to being at [facility], he was at a long term care facility following a COPD exacerbation, and had been found down, pulseless 10/20 in the afternoon. He received four rounds of epinephrine and CPR. Duration of arrest was at least 30 minutes. Sedation has been off for the last five days. His exam remains very poor. According to neurology notes from [facility]: \"He apparently had generalized convulsive event w/ autonomic changes which ICU staff felt was an epileptic seizure. He is now on Keppra. Currently his is unresponsive in bed. He does have a bit of R facial twitching at times. Some rhythmic movement of his R shoulder is noted though seems to correlate w/ the ventilator. \" He is transferred to [facility] for cEEG monitoring. REVIEW OF SYSTEMS:   Unable to assess given coma    Past Medical, Surgical, Family, and Social History   PAST MEDICAL HISTORY:  COPD  Depression    SURGICAL HISTORY:  No past surgical history on file.   FAMILY HISTORY & SOCIAL HISTORY:  Family history non-contributory    Social History     Tobacco Use    Smoking status: Unknown   Vaping Use    Vaping Use: Unknown     Allergies & Outpatient Medications   ALLERGIES:  Allergies   Allergen Reactions    Vancomycin      Unknown reaction     HOME MEDICATIONS:  Current Discharge Medication List        CONTINUE these medications which have NOT CHANGED    Details   acetaminophen (TYLENOL) 500 MG tablet Take 1,000 mg by mouth every 6 hours as needed for Pain or Fever LORazepam (ATIVAN) 0.5 MG tablet Take 0.5 mg by mouth every 8 hours as needed for Anxiety. azithromycin (ZITHROMAX) 250 MG tablet Take 250 mg by mouth three times a week Mon/Wed/Fri      buPROPion (WELLBUTRIN XL) 300 MG extended release tablet Take 300 mg by mouth every morning      apixaban (ELIQUIS) 5 MG TABS tablet Take 5 mg by mouth 2 times daily      hydrOXYzine HCl (ATARAX) 25 MG tablet Take 25 mg by mouth in the morning, at noon, and at bedtime      ipratropium-albuterol (DUONEB) 0.5-2.5 (3) MG/3ML SOLN nebulizer solution Inhale 1 vial into the lungs every 6 hours as needed for Shortness of Breath      pregabalin (LYRICA) 50 MG capsule Take 50 mg by mouth 3 times daily.       metoprolol tartrate (LOPRESSOR) 25 MG tablet Take 25 mg by mouth 2 times daily      mometasone (ASMANEX) 200 MCG/ACT AERO inhaler Inhale 2 puffs into the lungs 2 times daily      montelukast (SINGULAIR) 10 MG tablet Take 10 mg by mouth nightly      nicotine (NICODERM CQ) 21 MG/24HR Place 1 patch onto the skin every 24 hours      predniSONE (DELTASONE) 20 MG tablet Take 20 mg by mouth See Admin Instructions 20 mg through 10/21/22, then decrease to 10 mg daily through 11/11/22      pantoprazole (PROTONIX) 40 MG tablet Take 40 mg by mouth daily      oxyCODONE (ROXICODONE) 5 MG immediate release tablet Take 5 mg by mouth every 6 hours as needed for Pain.      tamsulosin (FLOMAX) 0.4 MG capsule Take 0.4 mg by mouth at bedtime               Physical Exam   PHYSICAL EXAM:  Vitals:    10/27/22 0600 10/27/22 0700 10/27/22 0746 10/27/22 0748   BP: 121/71 128/73     Pulse: (!) 114 (!) 118 (!) 116 (!) 122   Resp: 26 27 29 (!) 31   Temp:       TempSrc:       SpO2: 94% 93% 95% 93%   Weight:       Height:               Mental status:  Does not open eyes to pain; does not follow commands   CN2: Visual Fields: no blink to either side with threat  CN 3,4,6: Extraocular muscles intact by VOR  Gaze is deviated to the left but easily breaks with VOR  Pupils equal, round, reactive to light  Right Pupil 4 mm  Left Pupil 4 mm  CN5: Corneal reflexes intact bilaterally  CN7: Face symmetric but exam limited by ET tube  CN8: Unable to fully assess but appears to hear my voice  CN9,11: Cough reflex present; gag reflex present  CN 10, 12: Not testable due to mental status    Motor Exam:  RUE 0/5  LUE 0/5  RLE 3/5 - only with pain, suspect spinal reflex  LLE 3/5 - only with pain, suspect spinal reflex    Deep tendon reflexes: hypoactive and symmetric throughout     Sensory:  RUE: no response to trap pinch or nailbed pressure  RLE: no response to trap pinch or nailbed pressure  LUE: triple flexion with popliteal pinch  LLE: triple flexion with popliteal pinch    Coordination & Gait: not testable given mental status    Tone: normal in all 4 extremities    OTHER SYSTEMS  Cardiovascular: Warm, appears well perfused   Respiratory: Easy, non-labored respiratory pattern   Abdominal: Abdomen is without distention   Extremities: Upper and lower extremities are atraumatic in appearance without deformity. No swelling or erythema. Diagnostic Testing Results   IMAGES:  Images personally reviewed and agree w/ radiology interpretation. MRI Brain  Known diffuse bilateral Faye rolandic and occipital cytotoxic edema. Differential considerations include cerebritis, anoxic encephalopathy,   posterior reversible encephalopathy syndrome, ex cetera. EEG (10/24): severe generalized slowing and bi-hemispheric suppression    CT-C/A/P  1. CTA CHEST: Right pleural effusion with bibasilar consolidation as well as   patchy infiltrates bilateral lungs which may be related to acute infectious   inflammatory process or sequela of aspiration. 2.  Endotracheal tube tip is noted trachea, tip at the level of the aortic   knob. 3. Prominent supradiaphragmatic portion of the inferior vena cava as it   enters the right atrium, 5 cm diameter.    4. CTA ABDOMEN/PELVIS: No acute abnormality. 5.  Cholelithiasis and contracted gallbladder without other findings of acute   cholecystitis. 6. Hepatic steatosis. 7. NG tube courses within the esophagus, tip is at the proximal gastric body. 8. Bilateral adrenal nodular hyperplasia and bilateral adrenal adenomas   (benign finding requiring no additional evaluation or follow-up). CT C spine: no fracture    LABS:  All results below personally reviewed. Pertinent positives & negatives are addressed in Impression & Recommendations below.      LABS   Metabolic Panel Recent Labs     10/25/22  0325 10/26/22  0826 10/27/22  0349   * 134* 132*   K 4.7 5.2* 4.5   CL 93* 94* 92*   CO2 31 31 33*   BUN 23* 20 25*   CREATININE 0.5* 0.6* 0.7*   GLUCOSE 124* 122* 103*   CALCIUM 8.9 9.3 9.5   LABALBU 3.6  --   --    PHOS 4.7 2.9  --    MG 2.20 2.30  --    ALKPHOS 43  --   --    ALT 14  --   --    AST 30  --   --       CBC / Coags Recent Labs     10/25/22  0325 10/26/22  0826 10/27/22  0349   WBC 11.6* 14.7* 19.9*   RBC 3.55* 3.70* 3.71*   HGB 8.8* 9.2* 9.4*   HCT 28.6* 30.1* 29.6*    474* 455*      Other      CURRENT SCHEDULED MEDICATIONS   Inpatient Medications     sodium chloride flush, 5-40 mL, IntraVENous, 2 times per day    levETIRAcetam, 1,000 mg, IntraVENous, Q12H    famotidine (PEPCID) injection, 20 mg, IntraVENous, BID    sodium chloride flush, 5-40 mL, IntraVENous, 2 times per day    pregabalin, 50 mg, Oral, TID    tamsulosin, 0.4 mg, Oral, Nightly    ipratropium-albuterol, 1 ampule, Inhalation, Q4H    docusate, 100 mg, Oral, Daily    senna, 1 tablet, Oral, Nightly    polyethylene glycol, 17 g, Oral, Daily    budesonide, 1 mg, Nebulization, BID    lactobacillus, 2 capsule, Oral, BID WC    metoprolol tartrate, 25 mg, Oral, BID    furosemide, 20 mg, IntraVENous, BID    sulfamethoxazole-trimethoprim (BACTRIM) IVPB, 5 mg/kg, IntraVENous, Q8H    enoxaparin, 40 mg, SubCUTAneous, Daily   Infusions    sodium chloride      sodium chloride dextrose        Antibiotics   Recent Abx Admin                     sulfamethoxazole-trimethoprim (BACTRIM) 339.2 mg in dextrose 5 % 500 mL IVPB (mg) 339.2 mg New Bag 10/27/22 0437    sulfamethoxazole-trimethoprim (BACTRIM) 352 mg in dextrose 5 % 500 mL IVPB (mg) 352 mg New Bag 10/26/22 1955     352 mg New Bag  0953                       IMPRESSION & RECOMMENDATIONS     IMPRESSION:  Mr. Man Hernandez is a 35 y/o man with depression and COPD (non-compliant with home O2) who presents with out-of-hospital, unwitnessed arrest 10/20, expected to be > 30 min in duration. His exam has remained poor despite being off sedation for the last five days. MRI shows changes consistent with severe anoxia. Routine EEG with severe generalized slowing and suppression. RECOMMENDATIONS:  - cEEG (ordered). Continue Keppra for now. - Maintain MAP > 80 mmHg  - Avoid fevers  - Hyperventilation should be avoided - targeting PaCO2 > 40 mmHg is reasonable  - Exclude major exam confounders (hypothermia, severe hypotension, hypoglycemia, and metabolic derangements)  - Prognosis highly guarded. Palliative care has been consulted. I am happy to discuss prognosis with the family at any time, given the duration of time since his arrest and since he has been free from sedation several days. Vandana Cordova, ARIEL - CNP   Neurology & Neurocritical Care   10/27/2022 7:52 AM    ICU Patients:   Neurocritical Care Line: [phone]  Perfect[employer]erve: [facility] Neurocritical Care    Time independently spent by Nurse Practitioner reviewing chart and prior testing, obtaining history from patient, examining patient, ordering appropriate medications / diagnostics, reviewing results of diagnostics, counseling and educating patient / family, communicating plan with other providers and documenting clinical information in the EMR was approximately 60 minutes.

## 2022-10-27 NOTE — CARE COORDINATION
Case management is following for discharge planning. The chart was reviewed. Mr. Houser was transferred to [facility]. from [facility] for a neuro consult. He is a long-term care resident at [address] [place] [address]. Per Rj Grullon in admissions ([phone]), he will need [facility] prior authorization to return. Mr. Raquel Starks is presently intubated, sedated, and on CvEEG. Palliative care is following. Will address disposition once he is more medically stable.     Electronically signed by CARA Anne RN-[facility]  Case Management  [phone]

## 2022-10-27 NOTE — PROGRESS NOTES
20 mg IntraVENous BID    sodium chloride flush  5-40 mL IntraVENous 2 times per day    pregabalin  50 mg Oral TID    tamsulosin  0.4 mg Oral Nightly    docusate  100 mg Oral Daily    senna  1 tablet Oral Nightly    polyethylene glycol  17 g Oral Daily    budesonide  1 mg Nebulization BID    lactobacillus  2 capsule Oral BID     metoprolol tartrate  25 mg Oral BID    furosemide  20 mg IntraVENous BID    sulfamethoxazole-trimethoprim (BACTRIM) IVPB  5 mg/kg IntraVENous Q8H    enoxaparin  40 mg SubCUTAneous Daily     PRN Meds: sodium chloride flush, sodium chloride, ondansetron **OR** ondansetron, sodium chloride flush, sodium chloride, acetaminophen **OR** acetaminophen, glucose, dextrose bolus **OR** dextrose bolus, glucagon (rDNA), dextrose, hydrALAZINE, midazolam, LORazepam      Intake/Output Summary (Last 24 hours) at 10/27/2022 1101  Last data filed at 10/27/2022 1000  Gross per 24 hour   Intake 624.41 ml   Output 1880 ml   Net -1255.59 ml       Physical Exam Performed:    /75   Pulse (!) 108   Temp 99 °F (37.2 °C) (Axillary)   Resp 17   Ht 5' 6\" (1.676 m)   Wt 147 lb 14.9 oz (67.1 kg)   SpO2 96%   BMI 23.88 kg/m²     General appearance: intubated sedated  HEENT: Pupils dilated. Neck: Supple, Trachea midline. Respiratory:  Normal respiratory effort. Clear to auscultation, bilaterally without Rales/Wheezes/Rhonchi. Cardiovascular: Regular rate and rhythm with normal S1/S2 without murmurs, rubs or gallops. Abdomen: Soft, non-tender, non-distended with normal bowel sounds. Musculoskeletal: No clubbing, cyanosis or edema bilaterally. Skin: Skin color, texture, turgor normal.  No rashes or lesions.   Neurologic:  withdraws to pain, no cough or gag  Psychiatric: intubated, sedated  Capillary Refill: Brisk, 3 seconds, normal   Peripheral Pulses: +2 palpable, equal bilaterally       Labs:   Recent Labs     10/25/22  0325 10/26/22  0826 10/27/22  0349   WBC 11.6* 14.7* 19.9*   HGB 8.8* 9.2* 9.4* HCT 28.6* 30.1* 29.6*    474* 455*     Recent Labs     10/25/22  0325 10/26/22  0826 10/27/22  0349   * 134* 132*   K 4.7 5.2* 4.5   CL 93* 94* 92*   CO2 31 31 33*   BUN 23* 20 25*   CREATININE 0.5* 0.6* 0.7*   CALCIUM 8.9 9.3 9.5   PHOS 4.7 2.9  --      Recent Labs     10/25/22  0325   AST 30   ALT 14   BILITOT <0.2   ALKPHOS 43     No results for input(s): INR in the last 72 hours. No results for input(s): Max Ebbs in the last 72 hours. Urinalysis:      Lab Results   Component Value Date/Time    NITRU Negative 10/20/2022 03:28 PM    WBCUA 9 10/20/2022 03:28 PM    BACTERIA 1+ 10/20/2022 03:28 PM    RBCUA 1 10/20/2022 03:28 PM    BLOODU Negative 10/20/2022 03:28 PM    SPECGRAV 1.024 10/20/2022 03:28 PM    GLUCOSEU 100 10/20/2022 03:28 PM       Radiology:  XR CHEST PORTABLE   Final Result   1. No interval changes.               Assessment/Plan:    Active Hospital Problems    Diagnosis     Seizure-like activity ([facility] [facility] [R56.9]      Priority: Medium    Cardiopulmonary arrest ([facility] [I46.9]      Priority: Medium     Cardiopulmonary arrest  Unclear triggering event, found down at nursing home for ~30 minutes before arriving at [address] [place] [address] and mechanically ventilated  - lopressor   - ICU team managing     Anoxic Encephalopathy   Seizure-like activity  10/25 MRI with significant concern for anoxic encephalopathy likely 2/2 cardiopulmonary arrest. EEG without seizure on 10/24  - neurology consulted  - cEEG   - ativan, versed prn   - 1g keppra IV BID   - precedex gtt  - palliative care consulted  - neurology consulted     COPD  Respiratory failure  Gram negative pneumonia 2/[facility] [address]   - continue pulmocrit, duonebs   - continue lasix   - continue bactrim      Goals of care  - per sister Madison Loud, patient would NOT want life-saving measures in case of acute cardiac arrest in his current condition  - palliative consulted     DVT Prophylaxis: lovenox  Diet: Diet NPO  Code Status: Limited  PT/OT Eval Status: none    Dispo - ICU          Hansel Guevara DO

## 2022-10-27 NOTE — PROGRESS NOTES
10/27/22 1029   Encounter Summary   Encounter Overview/Reason  Initial Encounter   Service Provided For: Patient   Referral/Consult From: Nurse;Palliative Care   Support System Family members  (Per,  report from [facility].)   Last Encounter  10/27/22  (john)   Complexity of Encounter Moderate   Begin Time 0830   End Time  0900   Total Time Calculated 30 min   Encounter    Type Initial Screen/Assessment   Assessment/Intervention/Outcome   Assessment Unable to assess   Intervention Sustaining Presence/Ministry of presence   PT  was found with Trevin MEYERS at bedside only. I was  then given a short report and then I called [facility] for any additional details if any were available. I was informed that family support was marginal between two sisters who may be estranged from each other. PT is noted as Islam - no confirmation   Will continue to follow.   Staff Izzy Trevino MA

## 2022-10-27 NOTE — PROGRESS NOTES
CONTINUOUS EEG    Name:  Jamal Davis  Medical Record Number:  [id number]  Age: 36 y.o. Gender: male  : 1980  Today's Date:  10/27/2022  Room:  [address] [place] [address]  Vital Signs   /79   Pulse (!) 104   Temp 99 °F (37.2 °C) (Axillary)   Resp 28   Ht 5' 6\" (1.676 m)   Wt 147 lb 14.9 oz (67.1 kg)   SpO2 95%   BMI 23.88 kg/m²           Continuous EEG Testing Start Time:  11:12 AM      Comments: Viviana mendez [facility] contacted 11:32AM. Impedence on all leads are within normal limits. Today is day 1 of cvEEG. Permission given by NOT to wrap patient head. Plan of Care: Begin monitoring pt.     Electronically signed by Cielo Madrid on 10/27/2022 at 11:36 AM

## 2022-10-27 NOTE — CONSULTS
[facility]  Palliative Medicine Consultation Note      Date Of Admission:10/26/2022  Date of consult: 10/27/22  Seen by [facility] in the past:  Yes    Recommendations:        Saw Wicho Topete at the bedside. He remains intubated and only responsive to pain. Called his sisters, Stephanie Avalos and Jaylene Harrison, both went straight to voicemail and messages were left. 1. Goals of Care/Advanced Care planning/Code status: Continue current medical management. 2. Pain: CPOT 0  3. SOB: mechanical ventilation  4. Anoxic enchephalopathy: neurocritical care following, cvEEG starting, all sedation remains off  5. Disposition: TBD - pending hospital course     Addendum 1240: Spoke with pt's sister, Stephanie Avalos, and provided an update. I explained that he has been on cvEEG for one hour, but there are no results at this time. We discussed his poor prognosis. She said \"you mean they drug him all the way over there to do this but the outcome is going to be the same? \" She is frustrated because she has a difficult time with transportation and [facility] to get in contact with her sister, Jaylene Harrison, and call back. They would like to consider hospice care. Stephanie Avalos declined a meeting with neuro at this time. Reason for Consult:         [x]  Goals of Care  [x]  Code Status Discussion/Advanced Care Planning   [x]  Psychosocial/Family Support  []  Symptom Management  []  Other (Specify)    Requesting Physician: Dr. Ken Blakely:  cardiac arrest    History Obtained From:  electronic medical record    History of Present Illness:         Uyen Waterman is a 36 y.o. male with PMH of COPD and depression who presented from [facility] with anoxic encephalopathy and possible seizures after suffering a cardiac arrest on 10/20. He was found at his care facility, EMS was called and he was brought to the ED. There he has been intubated, off sedation, not responsive.  There was concern for seizure activity. Neurocritical care was consulted and he was transferred to [facility] for cvEEG. Of note, palliative care was following him at [facility]. Per their notes, pt's sister did not think the pt would want to cont aggressive care if he would not be able to \"participate. \"    Subjective:         Past Medical History:    No past medical history on file. Past Surgical History:    No past surgical history on file. Current Medications:    Medications Prior to Admission: acetaminophen (TYLENOL) 500 MG tablet, Take 1,000 mg by mouth every 6 hours as needed for Pain or Fever  LORazepam (ATIVAN) 0.5 MG tablet, Take 0.5 mg by mouth every 8 hours as needed for Anxiety. azithromycin (ZITHROMAX) 250 MG tablet, Take 250 mg by mouth three times a week Mon/Wed/Fri  buPROPion (WELLBUTRIN XL) 300 MG extended release tablet, Take 300 mg by mouth every morning  apixaban (ELIQUIS) 5 MG TABS tablet, Take 5 mg by mouth 2 times daily  hydrOXYzine HCl (ATARAX) 25 MG tablet, Take 25 mg by mouth in the morning, at noon, and at bedtime  ipratropium-albuterol (DUONEB) 0.5-2.5 (3) MG/3ML SOLN nebulizer solution, Inhale 1 vial into the lungs every 6 hours as needed for Shortness of Breath  pregabalin (LYRICA) 50 MG capsule, Take 50 mg by mouth 3 times daily.   metoprolol tartrate (LOPRESSOR) 25 MG tablet, Take 25 mg by mouth 2 times daily  mometasone (ASMANEX) 200 MCG/ACT AERO inhaler, Inhale 2 puffs into the lungs 2 times daily  montelukast (SINGULAIR) 10 MG tablet, Take 10 mg by mouth nightly  nicotine (NICODERM CQ) 21 MG/24HR, Place 1 patch onto the skin every 24 hours  predniSONE (DELTASONE) 20 MG tablet, Take 20 mg by mouth See Admin Instructions 20 mg through 10/21/22, then decrease to 10 mg daily through 11/11/22  pantoprazole (PROTONIX) 40 MG tablet, Take 40 mg by mouth daily  oxyCODONE (ROXICODONE) 5 MG immediate release tablet, Take 5 mg by mouth every 6 hours as needed for Pain.  tamsulosin (FLOMAX) 0.4 MG capsule, Take 0.4 mg by mouth at bedtime    Allergies:  Vancomycin    Social History:    TOBACCO: has no history on file for tobacco use. ETOH:   has no history on file for alcohol use. Patient currently lives in a nursing home    Review of Systems -   Review of Systems   Unable to perform ROS: Intubated     Objective:        Physical Exam  Constitutional:       Interventions: He is intubated. HENT:      Head: Normocephalic and atraumatic. Nose: Nose normal.      Mouth/Throat:      Mouth: Mucous membranes are moist.      Pharynx: Oropharynx is clear. Eyes:      Pupils: Pupils are unequal.      Right eye: Pupil is sluggish. Left eye: Pupil is sluggish. Cardiovascular:      Rate and Rhythm: Regular rhythm. Tachycardia present. Pulses: Normal pulses. Heart sounds: Normal heart sounds. Pulmonary:      Effort: He is intubated. Breath sounds: Wheezing and rhonchi present. Comments: Mechanical ventilation  Abdominal:      General: Abdomen is flat. Bowel sounds are normal.      Palpations: Abdomen is soft. Genitourinary:     Comments: Mendez in place  Skin:     General: Skin is warm and dry. Neurological:      Mental Status: He is lethargic. GCS: GCS eye subscore is 1. GCS verbal subscore is 1. GCS motor subscore is 3. Cranial Nerves: Cranial nerve deficit present. Motor: Weakness present. Palliative Performance Scale:  [] 60% Ambulation reduced; Significant disease; Can't do hobbies/housework; intake normal or reduced; occasional assist; LOC full/confusion  [] 50% Mainly sit/lie; Extensive disease; Can't do any work; Considerable assist; intake normal  Or reduced; LOC full/confusion  [] 40% Mainly in bed; Extensive disease; Mainly assist; intake normal or reduced; occasional assist; LOC full/confusion  [] 30% Bed Bound; Extensive disease; Total care; intake reduced; LOC full/confusion  [x] 20% Bed Bound; Extensive disease;  Total care; intake minimal; Drowsy/coma  [] 10% Bed Bound; Extensive disease; Total care; Mouth care only; Drowsy/coma  [] 0% Death    PPS: 20%    Vitals:    BP (!) 168/96   Pulse (!) 116   Temp 99 °F (37.2 °C) (Axillary)   Resp 29   Ht 5' 6\" (1.676 m)   Wt 147 lb 14.9 oz (67.1 kg)   SpO2 94%   BMI 23.88 kg/m²     Labs:    BMP:   Recent Labs     10/25/22  0325 10/26/22  0826 10/27/22  0349   * 134* 132*   K 4.7 5.2* 4.5   CL 93* 94* 92*   CO2 31 31 33*   BUN 23* 20 25*   CREATININE 0.5* 0.6* 0.7*   GLUCOSE 124* 122* 103*     CBC:   Recent Labs     10/25/22  0325 10/26/22  0826 10/27/22  0349   WBC 11.6* 14.7* 19.9*   HGB 8.8* 9.2* 9.4*   HCT 28.6* 30.1* 29.6*    474* 455*       LFT's:   Recent Labs     10/25/22  0325   AST 30   ALT 14   BILITOT <0.2   ALKPHOS 43     Troponin: No results for input(s): TROPONINI in the last 72 hours. BNP: No results for input(s): BNP in the last 72 hours. ABGs:   Recent Labs     10/26/22  0415 10/27/22  0348   PHART 7.404 7.448   LEU6YFB 57.0* 52.8*   PO2ART 77.7 78.8     INR: No results for input(s): INR in the last 72 hours. U/A:No results for input(s): NITRITE, COLORU, PHUR, LABCAST, WBCUA, RBCUA, MUCUS, TRICHOMONAS, YEAST, BACTERIA, CLARITYU, SPECGRAV, LEUKOCYTESUR, UROBILINOGEN, BILIRUBINUR, BLOODU, GLUCOSEU, AMORPHOUS in the last 72 hours. Invalid input(s): KETONESU    XR CHEST PORTABLE    (Results Pending)         Conclusion/Time spent:         Recommendations see above    Time spent with patient and/or family: 15  Time reviewing records: 10 min   Time communicating with staff: 5 min     A total of  minutes spent with the patient and family on unit greater than 50% in counseling regarding palliative care and in goals of care for the patient. Thank you to Dr. Sofie Merrill for this consultation. We will continue to follow  Michael Medina care as needed.     ARIEL Calderon NP

## 2022-10-27 NOTE — PROGRESS NOTES
Pt has been intubated since 10/20 from an outside hospital. Skin under the ETT suarez is clean and intact.

## 2022-10-28 NOTE — PROGRESS NOTES
Palliative Care Chart Review  and Check in Note:     NAME:  Monica Cantu  Admit Date: 10/26/2022  Hospital Day:  Hospital Day: 3   Current Code status: Limited    Palliative care is continuing to following Mr. Joe Granger for symptom management, and goals of care discussion as needed. Patient's chart reviewed today 10/28/22. Saw Dave Cancino at the bedside. His neuro exam is mostly unchanged from yesterday, he is opening his eyes spontaneously. Called his sisters and a third contact listed for him, all went to [employer]. Messages were left. Pt's sister, Gayathri Butterfield, said she had trouble with transportation to the hospital. Discussed with Sarahi Rudd RN case manager, who can get her a Lyft to the hospital. Discussed with Caitlin Zurita RN, and ARIEL Arvizu. Hospice is appropriate. Will await call back. Addendum 1045: Spoke with pt's sister, Gayathri Butterfield, and explained our recommendation of hospice. She said she was able to talk to his other sister, Angela Castillo, who wanted to give him more time. I reiterated the chance of a meaningful recovery was small. She asked about withdraw of care. I explained the process. Gayathri Butterfield will try and contact Angela Castillo again and will relay the information. Gayathri Butterfield denied needing transportation at this time. Addendum 1230: Spoke with pt's sister, Angela Castillo, at the bedside and provided a medical update, as well as the recommendation of hospice. Angela Castillo is agreeable to a hospice referral. Sarahi Rudd RN case manager, to send referral to [address] [place] [address]. Called Gayathri Butterfield to provide an update and her phone went straight to [employer], message left. Radha explained that Yu Vázquez is the pt's partner and that Yu Gurpreet and Dave Cancino have been partners since they were teenagers. So Yu Vázquez will need to be here when they withdraw care as well. Shakir Shen again to provide an update, he is in agreement with withdraw of care as well. Addendum 6242: Spoke with pt's sister, Gayathri Butterfield, via phone.  She said she will be coming to the hospital, but was not able to provide a time. Addendum : HOC met with pt's family. Discussed plan with Stalin and Toni Dean. They plan to withdraw care tomorrow when other family members are available. Comfort meds ordered. Pt is tachypneic, hypertensive, and tachycardic. Discussed with Mynor Evangelista, who will give pt morphine. ICU team aware. The following are the currently established goals/code status, and Symptom management.      Goals of care: Comfort     Code status: DNR/DNI    Discharge plan: [facility] [address], APRN - NP  10/28/22  9:33 AM

## 2022-10-28 NOTE — CARE COORDINATION
Spoke with the palliative care nurse practitioner, Parry Primrose. She has been working with Mr. Cheryl morales. They have requested a referral to [address] [place] [address]. A call was placed to [phone]. The referral was given and a facesheet faxed to [phone].     Electronically signed by CARA Porter RN-[facility]  Case Management  [phone]

## 2022-10-28 NOTE — PROGRESS NOTES
CONTINUOUS EEG    Name:  Km Rees  Medical Record Number:  [id number]  Age: 36 y.o. Gender: male  : 1980  Today's Date:  10/28/2022  Room:  [address] [place] [address]  Vital Signs   /69   Pulse (!) 104   Temp 99 °F (37.2 °C) (Axillary)   Resp 24   Ht 5' 5.98\" (1.676 m)   Wt 147 lb 14.9 oz (67.1 kg)   SpO2 90%   BMI 23.89 kg/m²           Continuous EEG Testing End Time:   12:10 PM      Comments:  Per Sixto Shaw. patient test is completed. [facility] contacted 12:10 via [address] [place] [address] of care:Removed all leads checked pt's scalp for redness none-found.       Electronically signed by Payton Pool on 10/28/2022 at 12:56 PM

## 2022-10-28 NOTE — PLAN OF CARE
Problem: Discharge Planning  Goal: Discharge to home or other facility with appropriate resources  10/28/2022 1753 by Juju Garcia RN  Outcome: Progressing  10/28/2022 1752 by Juju Garcia RN  Outcome: Progressing     Problem: Pain  Goal: Verbalizes/displays adequate comfort level or baseline comfort level  10/28/2022 1753 by Juju Garcia RN  Outcome: Progressing  10/28/2022 1752 by Juju Garcia RN  Outcome: Adequate for Discharge  Flowsheets  Taken 10/28/2022 0700 by Juju Garcia RN  Verbalizes/displays adequate comfort level or baseline comfort level: Assess pain using appropriate pain scale  Taken 10/28/2022 0400 by Sue Goodson RN  Verbalizes/displays adequate comfort level or baseline comfort level: Encourage patient to monitor pain and request assistance     Problem: Chronic Conditions and Co-morbidities  Goal: Patient's chronic conditions and co-morbidity symptoms are monitored and maintained or improved  10/28/2022 1753 by Juju Garcia RN  Outcome: Progressing  10/28/2022 1752 by Juju Garcia RN  Outcome: Adequate for Discharge     Problem: Skin/Tissue Integrity  Goal: Absence of new skin breakdown  Description: 1. Monitor for areas of redness and/or skin breakdown  2. Assess vascular access sites hourly  3. Every 4-6 hours minimum:  Change oxygen saturation probe site  4. Every 4-6 hours:  If on nasal continuous positive airway pressure, respiratory therapy assess nares and determine need for appliance change or resting period.   10/28/2022 1753 by Juju Garcia RN  Outcome: Progressing  10/28/2022 1752 by Juju Garcia RN  Outcome: Adequate for Discharge     Problem: Nutrition Deficit:  Goal: Optimize nutritional status  10/28/2022 1753 by Juju Garcia RN  Outcome: Progressing  10/28/2022 1752 by Juju Garcia RN  Outcome: Adequate for Discharge  Flowsheets (Taken 10/28/2022 1017 by Mauro Barney RD)  Nutrient intake appropriate for improving, restoring, or maintaining nutritional needs: Assess nutritional status and recommend course of action

## 2022-10-28 NOTE — PROGRESS NOTES
Hospice [facility]:  Met with pt's sister Stone Rivera and his significant other Eva Barrientos at bedside to review hospice services, philosophy and levels of care; GIP vs IPU depending on how pt is once he is extubated. They state they are unsure when they will be ready to withdrawal care as they are trying to coordinate schedules but that it will be this weekend. They are agreeable to having the Hospice RN call Stone Rivera tomorrow. RN and CM updated.      Brianne Mckenzie RN [address] [place] [address] [phone]

## 2022-10-28 NOTE — PLAN OF CARE
Problem: Discharge Planning  Goal: Discharge to home or other facility with appropriate resources  Outcome: Progressing  Flowsheets (Taken 10/27/2022 2000)  Discharge to home or other facility with appropriate resources: Identify barriers to discharge with patient and caregiver     Problem: Pain  Goal: Verbalizes/displays adequate comfort level or baseline comfort level  Outcome: Progressing  Flowsheets  Taken 10/28/2022 0000  Verbalizes/displays adequate comfort level or baseline comfort level: Assess pain using appropriate pain scale  Taken 10/27/2022 2000  Verbalizes/displays adequate comfort level or baseline comfort level: Assess pain using appropriate pain scale     Problem: Chronic Conditions and Co-morbidities  Goal: Patient's chronic conditions and co-morbidity symptoms are monitored and maintained or improved  Outcome: Progressing     Problem: Skin/Tissue Integrity  Goal: Absence of new skin breakdown  Description: 1. Monitor for areas of redness and/or skin breakdown  2. Assess vascular access sites hourly  3. Every 4-6 hours minimum:  Change oxygen saturation probe site  4. Every 4-6 hours:  If on nasal continuous positive airway pressure, respiratory therapy assess nares and determine need for appliance change or resting period.   Outcome: Progressing

## 2022-10-28 NOTE — PROGRESS NOTES
Neuro assessment unchanged/ pt does have reflex with pinch /pt does bite down making it difficult to do any oral care/ EEg remains abnormal/ reported at hand off family upset with transfer from [address] [place] [address] to [facility] making it further travel time and was considering withdrawal of care will reach out to palliative care and Neuro for guidance with ongoing care plan if hospice  is appropriate/ see orders/ flow sheet/ notes    1230 notified Anthony Talavera with Palliative care family is in pts room    [facility] + Palliative care spoke with familyw working out schedules with family to be present for withdrawal of care possible tomorrow/ palliative care will write for some comfort meds / family wants him to be comfortable/ see notes/ orders/     1600 loss of iv access/ both leaking/ will obtain another iv site

## 2022-10-28 NOTE — PROGRESS NOTES
ICU Progress Note    Admit Date: 10/26/2022  Day: 7  Vent Day: None  IV Access:Peripheral  IV Fluids:None  Vasopressors:None                Antibiotics: None  Diet: Diet NPO    CC: Cardiopulmonary arrest    Interval history: No seizures on EEG. No interval events. HPI: Per  ED: Kinsey Iqbal is a 36 y.o. male who transferred to [facility] from [facility]. Per chart review, the pt presented to [address] [place] [address],[address] ED via EMS in cardiac arrest.  \"History from EMS is that this patient was at a care facility. He has a history of severe COPD, with a history of noncompliance with his oxygen. He was found down at the care facility. EMS was called. EMS reports that they gave 2 A of bicarb, 4 rounds of epinephrine and they also did CPR through [place] device. They intubated the patient with a Garo airway. They did obtain return of spontaneous circulation. Patient is unable to provide history and currently there is no family to provide any further history. \"     Per  DC Summ: Patient admitted 5 days ago to [facility] [place] after an out of hospital cardiac arrest.  He is currently on vent support. Recovery postarrest been after holding sedation has not progressed. MRI concerning for underlying anoxic brain injury. There is also concern for possible underlying nonepileptic seizures given noted seizure activity on 10/24/2022. After discussion with patient's family and neurology patient is being transferred to [facility]. for continuous EEG to further assess for any ongoing seizure activity. Family is aware that prognosis is poor given the findings on MRI. Patient is medically stable for transfer to [facility].     Currently, the patient is intubated and mechanically ventilated, unresponsive.         Medications:     Scheduled Meds:   ipratropium-albuterol  1 ampule Inhalation 4x daily    sodium chloride flush  5-40 mL IntraVENous 2 times per day    levETIRAcetam  1,000 mg IntraVENous Q12H    famotidine (PEPCID) injection 20 mg IntraVENous BID    sodium chloride flush  5-40 mL IntraVENous 2 times per day    pregabalin  50 mg Oral TID    tamsulosin  0.4 mg Oral Nightly    docusate  100 mg Oral Daily    senna  1 tablet Oral Nightly    polyethylene glycol  17 g Oral Daily    budesonide  1 mg Nebulization BID    lactobacillus  2 capsule Oral BID     metoprolol tartrate  25 mg Oral BID    furosemide  20 mg IntraVENous BID    sulfamethoxazole-trimethoprim (BACTRIM) IVPB  5 mg/kg IntraVENous Q8H    enoxaparin  40 mg SubCUTAneous Daily     Continuous Infusions:   sodium chloride      sodium chloride      dextrose       PRN Meds:sodium chloride flush, sodium chloride, ondansetron **OR** ondansetron, sodium chloride flush, sodium chloride, acetaminophen **OR** acetaminophen, glucose, dextrose bolus **OR** dextrose bolus, glucagon (rDNA), dextrose, hydrALAZINE, midazolam, LORazepam    Objective:   Vitals:   T-max:  Patient Vitals for the past 8 hrs:   BP Temp Temp src Pulse Resp SpO2 Weight   10/28/22 0600 (!) 141/92 -- -- (!) 106 24 96 % 147 lb 14.9 oz (67.1 kg)   10/28/22 0500 134/78 -- -- (!) 107 24 95 % --   10/28/22 0400 118/81 99.1 °F (37.3 °C) Axillary (!) 112 25 94 % --   10/28/22 0339 -- -- -- (!) 114 (!) 31 94 % --   10/28/22 0300 (!) 140/83 -- -- (!) 113 26 95 % --   10/28/22 0200 (!) 154/83 -- -- (!) 117 28 94 % --   10/28/22 0100 128/78 -- -- (!) 109 24 93 % --   10/28/22 0001 -- -- -- (!) 104 21 97 % --   10/28/22 0000 (!) 144/89 99.1 °F (37.3 °C) Axillary (!) 105 21 95 % --       Intake/Output Summary (Last 24 hours) at 10/28/2022 0719  Last data filed at 10/28/2022 0606  Gross per 24 hour   Intake 1699.05 ml   Output 2250 ml   Net -550.95 ml       Review of Systems   Unable to perform ROS: Patient unresponsive     Physical Exam  Physical Exam  Gen: Diaphoretic, unresponsive   Eyes: Pupils dilated   ENT: No discharge. Pharynx with ETT and NG  Neck: Trachea midline. No obvious mass.     Resp: Diffuse wheezing bilaterally  CV: Tachycardic. No murmur or rub. GI: Distended, diminished BS  Skin: No rash, plethoric facies  Lymph: No cervical LAD. No supraclavicular LAD. M/S: No cyanosis. No clubbing. No joint deformity. Neuro: withdraws to pain in all four extremities, no cough, no gag  Ext:   trace edema    LABS:    CBC:   Recent Labs     10/26/22  0826 10/27/22  0349 10/28/22  0413   WBC 14.7* 19.9* 16.7*   HGB 9.2* 9.4* 9.4*   HCT 30.1* 29.6* 30.4*   * 455* 476*   MCV 81.3 79.9* 80.1     Renal:    Recent Labs     10/26/22  0826 10/27/22  0349 10/28/22  0413   * 132* 130*   K 5.2* 4.5 4.2   CL 94* 92* 90*   CO2 31 33* 29   BUN 20 25* 25*   CREATININE 0.6* 0.7* 0.7*   GLUCOSE 122* 103* 114*   CALCIUM 9.3 9.5 9.4   MG 2.30  --  2.00   PHOS 2.9  --  3.9   ANIONGAP 9 7 11     Hepatic:   Recent Labs     10/28/22  0413   LABALBU 3.8     Troponin: No results for input(s): TROPONINI in the last 72 hours. BNP: No results for input(s): BNP in the last 72 hours. Lipids: No results for input(s): CHOL, HDL in the last 72 hours. Invalid input(s): LDLCALCU, TRIGLYCERIDE  ABGs:    Recent Labs     10/26/22  0415 10/27/22  0348 10/28/22  0413   PHART 7.404 7.448 7.444   CAH8EOO 57.0* 52.8* 46.9*   PO2ART 77.7 78.8 73.5*   UWS2KHG 35.6* 37* 32*   BEART 9.5* 10.7* 7.1*   R0UJXTTQ 96.2 97 94   FFS5JWB 37.3 38 34       INR: No results for input(s): INR in the last 72 hours. Lactate: No results for input(s): LACTATE in the last 72 hours. Cultures:  -----------------------------------------------------------------  RAD:   XR CHEST PORTABLE   Final Result   1. No interval changes. Assessment/Plan:   Jennifer Haynes is a 36 y.o. male who presents to the emergency department via EMS in cardiac arrest. Found to have anoxic brain injury on MRI.  [facility] to [facility] out [facility], family would like to continue aggressive care but per sister patient is limited x4 if cardiac

## 2022-10-28 NOTE — PROGRESS NOTES
.Comprehensive Nutrition Assessment    RECOMMENDATIONS:  Due to inadequate intake, discussion of palliative care vs nutrition support should be considered. Consult dietitian if tube feeding is desired and consistent with patient's plan of care. Monitor nutrition adequacy, pertinent labs, bowel habits, wt changes, and clinical progress  Nutrition Education: Education not appropriate     NUTRITION ASSESSMENT:   Nutritional summary & status: New vent: Pt transferred from [address] [facility]. Intubated since 10/20. No current sedation. Per RD note from [facility] pt had begun TF but had emesis and TF was stopped. Received TF for <24 hours. Palliative is following and discussing goals of care w/ family. Pt is hospice appropriate, family to discuss in detail later today. Please consult RD if family wish to continue aggressive care and TF recs are needed. Will continue to monitor. Admission/PMH: Cardiopulmonary arrest, anoxic brain injury. PMHx; COPD    MALNUTRITION ASSESSMENT  Context of Malnutrition: Acute Illness   Malnutrition Status: At risk for malnutrition (Comment)  Findings of the 6 clinical characteristics of malnutrition (Minimum of 2 out of 6 clinical characteristics is required to make the diagnosis of moderate or severe Protein Calorie Malnutrition based on AND/ASPEN Guidelines):  Energy Intake:  50% or less of estimated energy requirements for 5 or more days  Weight Loss:  Unable to assess     Body Fat Loss:  Unable to assess     Muscle Mass Loss:  Unable to assess    Fluid Accumulation:  Mild      NUTRITION DIAGNOSIS   Inadequate oral intake related to impaired respiratory function as evidenced by intubation, NPO or clear liquid status due to medical condition    Nutrition Monitoring and Evaluation:   Food/Nutrient Intake Outcomes:   Other (Comment) (ENvsPO)  Physical Signs/Symptoms Outcomes:  Biochemical Data, Nutrition Focused Physical Findings, Weight, Hemodynamic Status     OBJECTIVE DATA: Significant to nutrition assessment  Nutrition Related Findings: Na 130. . Active bowel sounds/distended abdomen. +2 pitting edema. Wounds: None  Nutrition Goals: other (specify)     CURRENT NUTRITION THERAPIES  Diet NPO     Diet NPO  PO Intake: NPO   PO Supplement Intake:NPO  Additional Sources of Calories/IVF:N/A     ANTHROPOMETRICS  Current Height: 5' 5.98\" (167.6 cm)  Current Weight: 147 lb 14.9 oz (67.1 kg)    Admission weight: 147 lb 14.9 oz (67.1 kg)  Ideal Body Weight (IBW): 142 lbs  (65 kg)        BMI: 23.9    COMPARATIVE STANDARDS  Energy (kcal):  1814-3013 (20-25 kcal/kg CBW)     Protein (g):   (1.2-2.0 g/kg CBW)       Fluid (mL/day):  1 mL/kcal or per MD    The patient will be monitored per nutrition standards of care. Consult dietitian if additional nutrition interventions are needed prior to RD reassessment.      Sukumar Hardy, [address] [place] [address], [address] [place] [address] Drive:  [phone]  Office:  [phone]

## 2022-10-28 NOTE — PROGRESS NOTES
Hospitalist Progress Note      PCP: Areli Thomas DO    Date of Admission: 10/26/2022    Chief Complaint: unresponsive    Hospital Course: Nikole Morales is a 36 y.o. male who transferred to [facility] from [facility]. Per chart review, the pt presented to [address] [place] [address],[address] ED via EMS in cardiac arrest.  \"History from EMS is that this patient was at a care facility. He has a history of severe COPD, with a history of noncompliance with his oxygen. He was found down at the care facility. EMS was called. EMS reports that they gave 2 A of bicarb, 4 rounds of epinephrine and they also did CPR through [place] device. They intubated the patient with a [place] airway. They did obtain return of spontaneous circulation. Patient is unable to provide history and currently there is no family to provide any further history. \"     Per  DC Summ: Patient admitted 5 days ago to [facility] [place] after an out of hospital cardiac arrest.  He is currently on vent support. Recovery postarrest been after holding sedation has not progressed. MRI concerning for underlying anoxic brain injury. There is also concern for possible underlying nonepileptic seizures given noted seizure activity on 10/24/2022. After discussion with patient's family and neurology patient is being transferred to [facility]. for continuous EEG to further assess for any ongoing seizure activity. Family is aware that prognosis is poor given the findings on MRI. Patient is medically stable for transfer to [facility].     Currently, the patient is intubated and mechanically ventilated, unresponsive.      Subjective: intubated and sedated       Medications:  Reviewed    Infusion Medications    sodium chloride      sodium chloride      dextrose       Scheduled Medications    ipratropium-albuterol  1 ampule Inhalation 4x daily    sodium chloride flush  5-40 mL IntraVENous 2 times per day    levETIRAcetam  1,000 mg IntraVENous Q12H    famotidine (PEPCID) injection 20 mg IntraVENous BID    sodium chloride flush  5-40 mL IntraVENous 2 times per day    pregabalin  50 mg Oral TID    tamsulosin  0.4 mg Oral Nightly    docusate  100 mg Oral Daily    senna  1 tablet Oral Nightly    polyethylene glycol  17 g Oral Daily    budesonide  1 mg Nebulization BID    lactobacillus  2 capsule Oral BID     metoprolol tartrate  25 mg Oral BID    furosemide  20 mg IntraVENous BID    sulfamethoxazole-trimethoprim (BACTRIM) IVPB  5 mg/kg IntraVENous Q8H    enoxaparin  40 mg SubCUTAneous Daily     PRN Meds: sodium chloride flush, sodium chloride, ondansetron **OR** ondansetron, sodium chloride flush, sodium chloride, acetaminophen **OR** acetaminophen, glucose, dextrose bolus **OR** dextrose bolus, glucagon (rDNA), dextrose, hydrALAZINE, midazolam, LORazepam      Intake/Output Summary (Last 24 hours) at 10/28/2022 1239  Last data filed at 10/28/2022 0800  Gross per 24 hour   Intake 1679.05 ml   Output 1650 ml   Net 29.05 ml         Physical Exam Performed:    /69   Pulse (!) 104   Temp 99 °F (37.2 °C) (Axillary)   Resp 24   Ht 5' 5.98\" (1.676 m)   Wt 147 lb 14.9 oz (67.1 kg)   SpO2 90%   BMI 23.89 kg/m²     General appearance: intubated sedated  HEENT: Pupils dilated. Neck: Supple, Trachea midline. Respiratory:  Normal respiratory effort. Clear to auscultation, bilaterally without Rales/Wheezes/Rhonchi. Cardiovascular: Regular rate and rhythm with normal S1/S2 without murmurs, rubs or gallops. Abdomen: Soft, non-tender, non-distended with normal bowel sounds. Musculoskeletal: No clubbing, cyanosis or edema bilaterally. Skin: Skin color, texture, turgor normal.  No rashes or lesions.   Neurologic:  withdraws to pain, no cough or gag  Psychiatric: intubated, sedated  Capillary Refill: Brisk, 3 seconds, normal   Peripheral Pulses: +2 palpable, equal bilaterally       Labs:   Recent Labs     10/26/22  0826 10/27/22  0349 10/28/22  0413   WBC 14.7* 19.9* 16.7*   HGB 9.2* 9.4* 9.4* HCT 30.1* 29.6* 30.4*   * 455* 476*       Recent Labs     10/26/22  0826 10/27/22  0349 10/28/22  0413   * 132* 130*   K 5.2* 4.5 4.2   CL 94* 92* 90*   CO2 31 33* 29   BUN 20 25* 25*   CREATININE 0.6* 0.7* 0.7*   CALCIUM 9.3 9.5 9.4   PHOS 2.9  --  3.9       No results for input(s): AST, ALT, BILIDIR, BILITOT, ALKPHOS in the last 72 hours. No results for input(s): INR in the last 72 hours. No results for input(s): [place] in the last 72 hours. Urinalysis:      Lab Results   Component Value Date/Time    NITRU Negative 10/20/2022 03:28 PM    WBCUA 9 10/20/2022 03:28 PM    BACTERIA 1+ 10/20/2022 03:28 PM    RBCUA 1 10/20/2022 03:28 PM    BLOODU Negative 10/20/2022 03:28 PM    SPECGRAV 1.024 10/20/2022 03:28 PM    GLUCOSEU 100 10/20/2022 03:28 PM       Radiology:  XR CHEST PORTABLE   Final Result   1. No interval changes.               Assessment/Plan:    Active Hospital Problems    Diagnosis     Seizure-like activity ([facility] [R56.9]      Priority: Medium    Pneumonia [J18.9]      Priority: Medium    Acute respiratory failure with hypoxia ([facility] [J96.01]      Priority: Medium    Anoxic brain injury ([facility] [G93.1]      Priority: Medium    Cardiopulmonary arrest ([facility] [I46.9]      Priority: Medium     Cardiopulmonary arrest  Unclear triggering event, found down at nursing home for ~30 minutes before arriving at [address] [place] [address] and mechanically ventilated  - lopressor   - ICU team managing     Anoxic Encephalopathy   Seizure-like activity  10/25 MRI with significant concern for anoxic encephalopathy likely 2/2 cardiopulmonary arrest. EEG without seizure on 10/24  - neurology consulted  - cEEG   - ativan, versed prn   - 1g keppra IV BID   - precedex gtt  - palliative care consulted  - neurology consulted  -  EEG so far neg after discussing with neuro  - palliative will discuss with family more  - likely will need hospice     COPD  Respiratory failure  Gram negative pneumonia 2/2 stenotrophomonas per [facility]   - continue pulmocrit, duonemichael   - continue lasix   - continue bactrim      Goals of care  - per sister Jaz Wolfe, patient would NOT want life-saving measures in case of acute cardiac arrest in his current condition  - palliative consulted     DVT Prophylaxis: lovenox  Diet: Diet NPO  Code Status: Limited  PT/OT Eval Status: none    Dispo - ICU          Steffanie Crews, DO

## 2022-10-28 NOTE — PROGRESS NOTES
Shift handoff and assessment complete. Pt remains intubated-off sedation. Opens eyes, no tracking, cough or gag. VSS. CEEg. All safety precautions in place.

## 2022-10-28 NOTE — PROGRESS NOTES
NEUROLOGY / NEUROCRITICAL CARE PROGRESS NOTE       Patient Name: Sammie Bone YOB: 1980   Sex: Male Age: 36 yrs     CC / Reason for Consult:     Interval Hx / Changes over last 24 hours:   No seizures on cEEG but GPDs  Palliative care attempting to consult family; d/w palliative care. ROS: unable to assess given intubation, sedation     HISTORY   Admission HPI:   Sammie Bone is a 36 y.o. y/o male with PMH significant for COPD (home O2 dependence with non-compliance), depression who I am asked to see for anoxic encephalopathy. He is unable to provide history secondary to his comatose state. He is transferred from [facility] [place]. Prior to being at [facility], he was at a long term care facility following a COPD exacerbation, and had been found down, pulseless 10/20 in the afternoon. He received four rounds of epinephrine and CPR. Duration of arrest was at least 30 minutes. Sedation has been off for the last five days. His exam remains very poor. According to neurology notes from [facility]: \"He apparently had generalized convulsive event w/ autonomic changes which ICU staff felt was an epileptic seizure. He is now on Keppra. Currently his is unresponsive in bed. He does have a bit of R facial twitching at times. Some rhythmic movement of his R shoulder is noted though seems to correlate w/ the ventilator. \" He is transferred to [facility] for cEEG monitoring. PMH No past medical history on file.    Allergies Allergies   Allergen Reactions    Vancomycin      Unknown reaction      Diet Diet NPO   Isolation No active isolations     CURRENT SCHEDULED MEDICATIONS   Inpatient Medications     ipratropium-albuterol, 1 ampule, Inhalation, 4x daily    sodium chloride flush, 5-40 mL, IntraVENous, 2 times per day    levETIRAcetam, 1,000 mg, IntraVENous, Q12H    famotidine (PEPCID) injection, 20 mg, IntraVENous, BID    sodium chloride flush, 5-40 mL, IntraVENous, 2 times per day    pregabalin, 50 mg, Oral, TID    tamsulosin, 0.4 mg, Oral, Nightly    docusate, 100 mg, Oral, Daily    senna, 1 tablet, Oral, Nightly    polyethylene glycol, 17 g, Oral, Daily    budesonide, 1 mg, Nebulization, BID    lactobacillus, 2 capsule, Oral, BID WC    metoprolol tartrate, 25 mg, Oral, BID    furosemide, 20 mg, IntraVENous, BID    sulfamethoxazole-trimethoprim (BACTRIM) IVPB, 5 mg/kg, IntraVENous, Q8H    enoxaparin, 40 mg, SubCUTAneous, Daily   Infusions    sodium chloride      sodium chloride      dextrose        Antibiotics   Recent Abx Admin                     sulfamethoxazole-trimethoprim (BACTRIM) 339.2 mg in dextrose 5 % 500 mL IVPB (mg) 339.2 mg New Bag 10/28/22 0410     339.2 mg New Bag 10/27/22 2017     339.2 mg New Bag  1207                      LABS   Metabolic Panel Recent Labs     10/26/22  0826 10/27/22  0349 10/28/22  0413   * 132* 130*   K 5.2* 4.5 4.2   CL 94* 92* 90*   CO2 31 33* 29   BUN 20 25* 25*   CREATININE 0.6* 0.7* 0.7*   GLUCOSE 122* 103* 114*   CALCIUM 9.3 9.5 9.4   LABALBU  --   --  3.8   PHOS 2.9  --  3.9   MG 2.30  --  2.00      CBC / Coags Recent Labs     10/26/22  0826 10/27/22  0349 10/28/22  0413   WBC 14.7* 19.9* 16.7*   RBC 3.70* 3.71* 3.79*   HGB 9.2* 9.4* 9.4*   HCT 30.1* 29.6* 30.4*   * 455* 476*            DIAGNOSTICS   IMAGES:  Images personally reviewed and agree w/ radiology interpretation. MRI Brain  Known diffuse bilateral Faye rolandic and occipital cytotoxic edema. Differential considerations include cerebritis, anoxic encephalopathy,   posterior reversible encephalopathy syndrome, ex cetera. EEG (10/24): severe generalized slowing and bi-hemispheric suppression     CT-C/A/P  1. CTA CHEST: Right pleural effusion with bibasilar consolidation as well as   patchy infiltrates bilateral lungs which may be related to acute infectious   inflammatory process or sequela of aspiration.    2.  Endotracheal tube tip is noted trachea, tip at the level of the aortic knob.   3. Prominent supradiaphragmatic portion of the inferior vena cava as it   enters the right atrium, 5 cm diameter. 4. CTA ABDOMEN/PELVIS: No acute abnormality. 5.  Cholelithiasis and contracted gallbladder without other findings of acute   cholecystitis. 6. Hepatic steatosis. 7. NG tube courses within the esophagus, tip is at the proximal gastric body. 8. Bilateral adrenal nodular hyperplasia and bilateral adrenal adenomas   (benign finding requiring no additional evaluation or follow-up). CT C spine: no fracture      CVEEG:  10/27/2022 11:12am - 23:59pm   SEIZURES:  None  INTERICTAL:  Frequent brief generalized periodic discharges (GPDs) at 0.5-1Hz that do not build up or evolve    PUSHBUTTONS:  None  BACKGROUND:  Abundant mixed delta/theta frequencies. EKG:  regular  CLINICAL INTERPRETATION:  This was an abnormal tracing for age and state due to a severe generalized slow wave abnormality indicating diffuse cerebral dysfunction which can be of multiple causes, including structural, or vascular abnormalities, toxic/metabolic conditions, hydrocephalus, or postictal conditions. GPDs lie along the ictal-interictal continuum which are of unclear significance but may be associated with seizures. No definite seizures were seen during this recording. Clinical correlation is advised.       PHYSICAL EXAMINATION     PHYSICAL EXAM:  Vitals:    10/28/22 0600 10/28/22 0700 10/28/22 0815 10/28/22 0820   BP: (!) 141/92 (!) 136/97     Pulse: (!) 106 (!) 109 (!) 107 (!) 112   Resp: 24 24 24 27   Temp:  98.6 °F (37 °C)     TempSrc:       SpO2: 96% 97% 98% 95%   Weight: 147 lb 14.9 oz (67.1 kg)      Height:           Mental status:  Opens eyes spontaneously does not follow commands   CN2: Visual Fields: no blink to either side with threat but blinks spontaneously  CN 3,4,6: Extraocular muscles dev to left but breaks with VOR  Gaze is deviated to the left but easily breaks with VOR  Pupils equal, round, reactive to light  Right Pupil 4 mm  Left Pupil 4 mm  CN5: Corneal reflexes intact bilaterally  CN7: Face symmetric but exam limited by ET tube  CN8: Unable to fully assess but appears to hear my voice  CN9,11: Cough reflex present; gag reflex present  CN 10, 12: Not testable due to mental status     Motor Exam:  RUE 0/5  LUE 0/5  RLE 3/5 - only with pain, suspect spinal reflex  LLE 3/5 - only with pain, suspect spinal reflex     Deep tendon reflexes: hypoactive and symmetric throughout      Sensory:  RUE: no response to trap pinch or nailbed pressure  RLE: no response to trap pinch or nailbed pressure  LUE: triple flexion with popliteal pinch  LLE: triple flexion with popliteal pinch     Coordination & Gait: not testable given mental status     Tone: normal in all 4 extremities     OTHER SYSTEMS  Cardiovascular: Warm, appears well perfused   Respiratory: Easy, non-labored respiratory pattern   Abdominal: Abdomen is without distention   Extremities: Upper and lower extremities are atraumatic in appearance without deformity. No swelling or erythema. ASSESSMENT & RECOMMENDATIONS   Assessment:  Mr. Elsy Coley is a 35 y/o man with depression and COPD (non-compliant with home O2) who presents with out-of-hospital, unwitnessed arrest 10/20, expected to be > 30 min in duration. His exam has remained poor despite being off sedation for the last five days. MRI shows changes consistent with severe anoxia. Routine EEG with severe generalized slowing and suppression. RECOMMENDATIONS:  - d/c cEEG  - Maintain MAP > 80 mmHg  - Avoid fevers  - Hyperventilation should be avoided - targeting PaCO2 > 40 mmHg is reasonable  - Exclude major exam confounders (hypothermia, severe hypotension, hypoglycemia, and metabolic derangements)  - Prognosis highly guarded. Palliative care has been consulted.  I am happy to discuss prognosis with the family at any time, given the duration of time since his arrest and since he has been free from sedation several days. ARIEL Barajas - CNP   Neurology & Neurocritical Care   10/28/2022 9:28 AM    ICU Patients:   Neurocritical Care Line: [phone]  [facility]: [facility] Neurocritical Care    Critical Care:  Due to the immediate potential for life-threatening deterioration due to neurological failure, I spent 30 minutes providing critical care. This time excludes time spent performing procedures but includes time spent on direct patient care, history retrieval, review of the chart, and discussions with patient, family, and consultant(s).

## 2022-10-28 NOTE — PROGRESS NOTES
Physician Progress Note      Andrzej Good  CSN #:                  [id number]  :                       1980  ADMIT DATE:       10/20/2022 3:11 PM  DISCH DATE:        10/26/2022 8:53 PM  RESPONDING  PROVIDER #:        Juli Brice MD          QUERY TEXT:    Dear Dr. Bin Gordon,  Patient admitted following cardiac arrest.  H and P notes Cardiorespiratory   arrest/not shockable rhythm and ED notes Respiratory arrest. If possible,   please document in progress notes and discharge summary the cause of cardiac   arrest:      The medical record reflects the following:  Risk Factors: Hx of severe COPD, with a history of noncompliance with his   oxygen, chronic resp failure with hypoxia and hypercapnia, ETOH abuse, tobacco   use disorder  Clinical Indicators: ED 10/20 s/p Cardiac arrest-Per EMS report, pt was found   down unresponsive in the nursing home with absent pulse. CPR started, EMS   intubated the patient with a [place] airway,  continues CPR for 20 min with ROSC   being obtained. ED notes \"Work-up of the patient is significant for   hypercarbia, respiratory acidosis, leukocytosis, and a CT scan concerning for   acute infectious inflammatory process or sequelae of aspiration. Pulmonary   consult notes \"Hypoxemic respiratory failure, cardiac arrest\"  Treatment: Intubation, Ventilator, Bronch, Pulm Consult, abx  Thank you,  Kavya Scherer RN, CDS  [email or web address]  Options provided:  -- Cardiac Arrest due to Acute Respiratory Failure  -- Other - I will add my own diagnosis  -- Disagree - Not applicable / Not valid  -- Disagree - Clinically unable to determine / Unknown  -- Refer to Clinical Documentation Reviewer    PROVIDER RESPONSE TEXT:    Provider is clinically unable to determine a response to this query. Query created by:  [id number] [place] on 10/28/2022 10:58 AM      Electronically signed by:  Juli Brice MD 10/28/2022 11:17 AM

## 2022-10-29 PROBLEM — I46.9 CARDIAC ARREST (HCC): Status: ACTIVE | Noted: 2022-01-01

## 2022-10-29 NOTE — PROGRESS NOTES
RT at bedside/ extubating pt / family at bedside with / administer morphine as ordered for air hungar/ cont to support family and pt during this difficult time

## 2022-10-29 NOTE — PROGRESS NOTES
Todays plan once family comes in today will contact Hospice / overall plan is to withdraw care/ comfort care once family able to coordinate / see orders notes flow sheet    1323 Hospice and family present/ informed ICU Resident team/ preparing for withdraw of care    1400 family at bedside/ want time to spend with pt before withdrawing/ extubating pt/ cont to support family will terminally extubate once family ready/ relayed to RT will call when ready    [phone]  on the way to be with family/ pt discharged and readmitting to hospice

## 2022-10-29 NOTE — PROGRESS NOTES
ICU Progress Note    Admit Date: 10/26/2022  Day: 8  Vent Day: None  IV Access:Peripheral  IV Fluids:None  Vasopressors:None                Antibiotics: None  Diet: Diet NPO    CC: Cardiopulmonary arrest    Interval history: No acute events overnight. Remains unresponsive off sedation. Off EEG. HPI: Per  ED: Alley Duvall is a 36 y.o. male who transferred to [place] [address] from [facility]. Per chart review, the pt presented to [address] [place] [address],[address] ED via EMS in cardiac arrest.  \"History from EMS is that this patient was at a care facility. He has a history of severe COPD, with a history of noncompliance with his oxygen. He was found down at the care facility. EMS was called. EMS reports that they gave 2 A of bicarb, 4 rounds of epinephrine and they also did CPR through [place] device. They intubated the patient with a Garo airway. They did obtain return of spontaneous circulation. Patient is unable to provide history and currently there is no family to provide any further history. \"  Per  DC Summ: Patient admitted 5 days ago to [facility] [place] after an out of hospital cardiac arrest.  He is currently on vent support. Recovery postarrest been after holding sedation has not progressed. MRI concerning for underlying anoxic brain injury. There is also concern for possible underlying nonepileptic seizures given noted seizure activity on 10/24/2022. After discussion with patient's family and neurology patient is being transferred to [facility]. for continuous EEG to further assess for any ongoing seizure activity. Family is aware that prognosis is poor given the findings on MRI. Patient is medically stable for transfer to [facility].  Currently, the patient is intubated and mechanically ventilated, unresponsive.         Medications:     Scheduled Meds:   scopolamine  1 patch TransDERmal Q72H    ipratropium-albuterol  1 ampule Inhalation 4x daily    sodium chloride flush  5-40 mL IntraVENous 2 times per day levETIRAcetam  1,000 mg IntraVENous Q12H    famotidine (PEPCID) injection  20 mg IntraVENous BID    sodium chloride flush  5-40 mL IntraVENous 2 times per day    pregabalin  50 mg Oral TID    tamsulosin  0.4 mg Oral Nightly    docusate  100 mg Oral Daily    senna  1 tablet Oral Nightly    polyethylene glycol  17 g Oral Daily    budesonide  1 mg Nebulization BID    lactobacillus  2 capsule Oral BID WC    metoprolol tartrate  25 mg Oral BID    furosemide  20 mg IntraVENous BID    sulfamethoxazole-trimethoprim (BACTRIM) IVPB  5 mg/kg IntraVENous Q8H    enoxaparin  40 mg SubCUTAneous Daily     Continuous Infusions:   sodium chloride      sodium chloride      dextrose       PRN Meds:morphine **OR** morphine, LORazepam, sodium chloride flush, sodium chloride, ondansetron **OR** ondansetron, sodium chloride flush, sodium chloride, acetaminophen **OR** acetaminophen, glucose, dextrose bolus **OR** dextrose bolus, glucagon (rDNA), dextrose, hydrALAZINE, midazolam    Objective:   Vitals:   T-max:  Patient Vitals for the past 8 hrs:   BP Temp Temp src Pulse Resp SpO2 Weight   10/29/22 0500 (!) 141/97 -- -- 83 17 96 % --   10/29/22 0439 -- -- -- -- -- -- 139 lb 12.4 oz (63.4 kg)   10/29/22 0421 -- -- -- -- 19 -- --   10/29/22 0401 -- -- -- (!) 103 19 97 % --   10/29/22 0400 (!) 155/94 98 °F (36.7 °C) Axillary (!) 105 19 97 % --   10/29/22 0300 133/82 -- -- 97 19 98 % --   10/29/22 0200 119/79 -- -- 90 18 98 % --   10/29/22 0100 123/77 -- -- 92 19 98 % --   10/29/22 0025 131/82 -- -- 93 18 97 % --   10/29/22 0020 131/82 98.1 °F (36.7 °C) Axillary 95 19 96 % --   10/28/22 2300 138/80 -- -- 94 19 95 % --   10/28/22 2200 116/70 -- -- 92 19 97 % --         Intake/Output Summary (Last 24 hours) at 10/29/2022 0527  Last data filed at 10/29/2022 0439  Gross per 24 hour   Intake 1386.59 ml   Output 2730 ml   Net -1343.41 ml         Review of Systems   Unable to perform ROS: Patient unresponsive     Physical Exam  Physical Exam  Gen: Diaphoretic, unresponsive   Eyes: Pupils dilated   ENT: No discharge. Pharynx with ETT and NG  Neck: Trachea midline. No obvious mass. Resp: Diffuse wheezing bilaterally  CV: Tachycardic. No murmur or rub. GI: Distended, diminished BS  Skin: No rash, plethoric facies  Lymph: No cervical LAD. No supraclavicular LAD. M/S: No cyanosis. No clubbing. No joint deformity. Neuro: withdraws to pain in all four extremities, no cough, no gag  Ext:   trace edema    LABS:    CBC:   Recent Labs     10/26/22  0826 10/27/22  0349 10/28/22  0413   WBC 14.7* 19.9* 16.7*   HGB 9.2* 9.4* 9.4*   HCT 30.1* 29.6* 30.4*   * 455* 476*   MCV 81.3 79.9* 80.1       Renal:    Recent Labs     10/26/22  0826 10/27/22  0349 10/28/22  0413   * 132* 130*   K 5.2* 4.5 4.2   CL 94* 92* 90*   CO2 31 33* 29   BUN 20 25* 25*   CREATININE 0.6* 0.7* 0.7*   GLUCOSE 122* 103* 114*   CALCIUM 9.3 9.5 9.4   MG 2.30  --  2.00   PHOS 2.9  --  3.9   ANIONGAP 9 7 11       Hepatic:   Recent Labs     10/28/22  0413   LABALBU 3.8       Troponin: No results for input(s): TROPONINI in the last 72 hours. BNP: No results for input(s): BNP in the last 72 hours. Lipids: No results for input(s): CHOL, HDL in the last 72 hours. Invalid input(s): LDLCALCU, TRIGLYCERIDE  ABGs:    Recent Labs     10/27/22  0348 10/28/22  0413 10/29/22  0436   PHART 7.448 7.444 7.347*   NAY0OOO 52.8* 46.9* 59.2*   PO2ART 78.8 73.5* 103.0   RSK3AKG 37* 32* 32*   BEART 10.7* 7.1* 5.6*   O5XCOEEX 97 94 97   PEC4FSP 38 34 34         INR: No results for input(s): INR in the last 72 hours. Lactate: No results for input(s): LACTATE in the last 72 hours. Cultures:  -----------------------------------------------------------------  RAD:   XR CHEST PORTABLE   Final Result   1. No interval changes. Assessment/Plan:   Alejo Gastelum is a 36 y.o. male who presents to the emergency department via EMS in cardiac arrest. Found to have anoxic brain injury on MRI.  Noted to have seizure like activity at [facility]. [facility] as a transfer for EEG to rule out seizures, family would like to continue aggressive care but per sister patient is limited x4 if cardiac arrest.      Cardiopulmonary arrest  Unclear triggering event, found down at nursing home for ~30 minutes before arriving at [address] [place] [address] and mechanically ventilated  - lopressor     Anoxic Encephalopathy   Seizure-like activity  10/25 MRI with significant concern for anoxic encephalopathy likely 2/2 cardiopulmonary arrest. EEG without seizure on 10/24  - neurology consulted  - cEEG with diffuse slowing w no seizure activity, d/c per neuro  - ativan, versed prn   - 1g keppra IV BID  - continue pregabalin     COPD  Respiratory failure  Gram negative pneumonia 2/2 stenotrophomonas per [facility]. Bronchial breath sounds on right. CXR obtained with minimal change in consolidation. - continue pulmocrit, duonebs   - continue lasix   - continue bactrim      Goals of care  - per sister Naye Villarreal, patient would NOT want life-saving measures in case of acute cardiac arrest in his current condition  - palliative consulted   - hospice referral placed   - likely withdrawal of care per family this weekend with transition to comfort care status         Code Status:Limited  FEN: NPO  PPX:  Lovenox, pepcid  DISPO: ICU    Barby Coelho MD, PGY-1  10/29/22  5:27 AM    This patient has been staffed and discussed with Dr. Daquan Toro.

## 2022-10-29 NOTE — PROGRESS NOTES
Hospitalist Progress Note      PCP: Yissel Locke DO    Date of Admission: 10/26/2022    Chief Complaint: unresponsive    Hospital Course: Darrion Alexander is a 36 y.o. male who transferred to [facility] from [facility]. Per chart review, the pt presented to [address] [place] [address],[address] ED via EMS in cardiac arrest.  \"History from EMS is that this patient was at a care facility. He has a history of severe COPD, with a history of noncompliance with his oxygen. He was found down at the care facility. EMS was called. EMS reports that they gave 2 A of bicarb, 4 rounds of epinephrine and they also did CPR through [place] device. They intubated the patient with a Garo airway. They did obtain return of spontaneous circulation. Patient is unable to provide history and currently there is no family to provide any further history. \"     Per  DC Summ: Patient admitted 5 days ago to [facility] [place] after an out of hospital cardiac arrest.  He is currently on vent support. Recovery postarrest been after holding sedation has not progressed. MRI concerning for underlying anoxic brain injury. There is also concern for possible underlying nonepileptic seizures given noted seizure activity on 10/24/2022. After discussion with patient's family and neurology patient is being transferred to [facility]. for continuous EEG to further assess for any ongoing seizure activity. Family is aware that prognosis is poor given the findings on MRI. Patient is medically stable for transfer to [facility].     Currently, the patient is intubated and mechanically ventilated, unresponsive.      Subjective: intubated and sedated       Medications:  Reviewed    Infusion Medications    sodium chloride      sodium chloride      dextrose       Scheduled Medications    scopolamine  1 patch TransDERmal Q72H    ipratropium-albuterol  1 ampule Inhalation 4x daily    sodium chloride flush  5-40 mL IntraVENous 2 times per day    levETIRAcetam  1,000 mg IntraVENous Q12H    famotidine (PEPCID) injection  20 mg IntraVENous BID    sodium chloride flush  5-40 mL IntraVENous 2 times per day    pregabalin  50 mg Oral TID    tamsulosin  0.4 mg Oral Nightly    docusate  100 mg Oral Daily    senna  1 tablet Oral Nightly    polyethylene glycol  17 g Oral Daily    budesonide  1 mg Nebulization BID    lactobacillus  2 capsule Oral BID WC    metoprolol tartrate  25 mg Oral BID    furosemide  20 mg IntraVENous BID    sulfamethoxazole-trimethoprim (BACTRIM) IVPB  5 mg/kg IntraVENous Q8H    enoxaparin  40 mg SubCUTAneous Daily     PRN Meds: morphine **OR** morphine, LORazepam, sodium chloride flush, sodium chloride, ondansetron **OR** ondansetron, sodium chloride flush, sodium chloride, acetaminophen **OR** acetaminophen, glucose, dextrose bolus **OR** dextrose bolus, glucagon (rDNA), dextrose, hydrALAZINE, midazolam      Intake/Output Summary (Last 24 hours) at 10/29/2022 1125  Last data filed at 10/29/2022 1000  Gross per 24 hour   Intake 1197.93 ml   Output 2280 ml   Net -1082.07 ml         Physical Exam Performed:    /78   Pulse 91   Temp 98 °F (36.7 °C) (Axillary)   Resp 18   Ht 5' 5.98\" (1.676 m)   Wt 139 lb 12.4 oz (63.4 kg)   SpO2 95%   BMI 22.57 kg/m²     General appearance: intubated sedated  HEENT: Pupils dilated. Neck: Supple, Trachea midline. Respiratory:  Normal respiratory effort. Clear to auscultation, bilaterally without Rales/Wheezes/Rhonchi. Cardiovascular: Regular rate and rhythm with normal S1/S2 without murmurs, rubs or gallops. Abdomen: Soft, non-tender, non-distended with normal bowel sounds. Musculoskeletal: No clubbing, cyanosis or edema bilaterally. Skin: Skin color, texture, turgor normal.  No rashes or lesions.   Neurologic:  withdraws to pain, no cough or gag  Psychiatric: intubated, sedated  Capillary Refill: Brisk, 3 seconds, normal   Peripheral Pulses: +2 palpable, equal bilaterally       Labs:   Recent Labs     10/27/22  8073 10/28/22  0413 10/29/22  0539   WBC 19.9* 16.7* 11.0   HGB 9.4* 9.4* 9.0*   HCT 29.6* 30.4* 28.2*   * 476* 398       Recent Labs     10/27/22  0349 10/28/22  0413 10/29/22  0539   * 130* 125*   K 4.5 4.2 4.2   CL 92* 90* 89*   CO2 33* 29 28   BUN 25* 25* 20   CREATININE 0.7* 0.7* <0.5*   CALCIUM 9.5 9.4 9.0   PHOS  --  3.9 3.4       No results for input(s): AST, ALT, BILIDIR, BILITOT, ALKPHOS in the last 72 hours. No results for input(s): INR in the last 72 hours. No results for input(s): [place] in the last 72 hours. Urinalysis:      Lab Results   Component Value Date/Time    NITRU Negative 10/20/2022 03:28 PM    WBCUA 9 10/20/2022 03:28 PM    BACTERIA 1+ 10/20/2022 03:28 PM    RBCUA 1 10/20/2022 03:28 PM    BLOODU Negative 10/20/2022 03:28 PM    SPECGRAV 1.024 10/20/2022 03:28 PM    GLUCOSEU 100 10/20/2022 03:28 PM       Radiology:  XR CHEST PORTABLE   Final Result   1. No interval changes.               Assessment/Plan:    Active Hospital Problems    Diagnosis     Seiz[facility] ([facility] [R56.9]      Priority: Medium    Pneumonia [J18.9]      Priority: Medium    Acute respiratory failure with hypoxia ([facility] [J96.01]      Priority: Medium    Anoxic brain injury ([facility] [G93.1]      Priority: Medium    Cardiopulmonary arrest ([facility] [I46.9]      Priority: Medium     Cardiopulmonary arrest  Unclear triggering event, found down at nursing home for ~30 minutes before arriving at [address] [place] [address] and mechanically ventilated  - lopressor   - ICU team managing     Anoxic Encephalopathy   Seizure-like activity  10/25 MRI with significant concern for anoxic encephalopathy likely 2/2 cardiopulmonary arrest. EEG without seizure on 10/24  - neurology consulted  - cEEG   - ativan, versed prn   - 1g keppra IV BID   - precedex gtt  - palliative care consulted  - neurology consulted  -  EEG so far neg after discussing with neuro  - palliative will discuss with family more  - likely will need hospice  - family to have meeting with hospice     COPD  Respiratory failure  Gram negative pneumonia 2/2 stenotrophomonas per [facility]   - continue pulmocrit, duonebs   - continue lasix   - continue bactrim      Goals of care  - per sister CARLOS SNYDER, patient would NOT want life-saving measures in case of acute cardiac arrest in his current condition  - palliative consulted     DVT Prophylaxis: lovenox  Diet: Diet NPO  Code Status: Limited  PT/OT Eval Status: none    Dispo - ICU          Hansel Guevara DO

## 2022-10-29 NOTE — PLAN OF CARE
Problem: Discharge Planning  Goal: Discharge to home or other facility with appropriate resources  10/29/2022 0508 by Yelitza Lea RN  Outcome: Progressing     Problem: Pain  Goal: Verbalizes/displays adequate comfort level or baseline comfort level  10/29/2022 0508 by Yelitza Lea RN  Outcome: Progressing     Problem: Chronic Conditions and Co-morbidities  Goal: Patient's chronic conditions and co-morbidity symptoms are monitored and maintained or improved  10/29/2022 0508 by Yelitza Lea RN  Outcome: Progressing     Problem: Skin/Tissue Integrity  Goal: Absence of new skin breakdown  Description: 1. Monitor for areas of redness and/or skin breakdown  2. Assess vascular access sites hourly  3. Every 4-6 hours minimum:  Change oxygen saturation probe site  4. Every 4-6 hours:  If on nasal continuous positive airway pressure, respiratory therapy assess nares and determine need for appliance change or resting period.   10/29/2022 0508 by Yelitza Lea RN  Outcome: Progressing     Problem: Nutrition Deficit:  Goal: Optimize nutritional status  10/29/2022 0508 by Yelitza Lea RN  Outcome: Progressing     Problem: Safety - Adult  Goal: Free from fall injury  Outcome: Progressing  Flowsheets (Taken 10/28/2022 2121)  Free From Fall Injury: Instruct family/caregiver on patient safety

## 2022-10-29 NOTE — PROGRESS NOTES
10/29/22 1827   Encounter Summary   Encounter Overview/Reason  Grief, Loss, and Adjustments   Service Provided For: Patient and family together   Referral/Consult From: [id number] [address] Family members   Last Encounter  10/29/22  ([facility])   Complexity of Encounter Moderate   Begin Time 1715   End Time  1828   Total Time Calculated 73 min   Encounter    Type Family Care   Crisis   Type Family Care   Spiritual/Emotional needs   Type Spiritual Support   Rituals, Rites and Sacraments   Type Blessings   Grief, Loss, and Adjustments   Type Hospice; End of Life;Grief and loss   Assessment/Intervention/Outcome   Assessment Sad;Tearful;Coping   Intervention End of Life Care;Grief Care;Prayer (assurance of)/[place];Sustaining Presence/Ministry of presence   Outcome Acceptance;Grieving

## 2022-10-29 NOTE — PROGRESS NOTES
[facility]    T/C to dgts this am. Dgt Jaime Polk stated she thought the meeting was supposed to occur close to 1000, however, now she believes the family meeting will be closer to 12noon. [address] RN arrived at 1, no family present, update given to Jono Andrew. HOC will continue to f/u. Thank you for this referral.  For questions/concerns please call  [phone].     Jaz Bassett RN

## 2022-10-29 NOTE — PROGRESS NOTES
RT at bedside/ extubating pt / family at bedside with / administer morphine as ordered for air hungar/ cont to support family and pt during this difficult time    1900 family at bedside/ hospice at bedside pt quiet no distress/ cont to monitor for any s/s of distress [Mother] : mother

## 2022-10-29 NOTE — PROGRESS NOTES
Spoke with patient family. Discussed the patient prognosis and neurological status. Patient's family mentioned that patient wishes in this situation will align with hospice care and comfort measures. Patient's family agreed with hospice. Patient will be discharged and readmitted to hospice care with comfort measures. Code Status will be changed to [facility]. DNR CC paperwork was signed.      Electronically signed by Gustav Goldmann, MD on 10/29/22 at 6:05 PM EDT

## 2022-10-29 NOTE — PLAN OF CARE
Problem: Discharge Planning  Goal: Discharge to home or other facility with appropriate resources  10/29/2022 1701 by Toby Adams RN  Outcome: Progressing  10/29/2022 0508 by Haritha Archibald RN  Outcome: Progressing     Problem: Pain  Goal: Verbalizes/displays adequate comfort level or baseline comfort level  10/29/2022 1701 by Toby Adams RN  Outcome: Progressing  10/29/2022 0508 by Haritha Archibald RN  Outcome: Progressing     Problem: Chronic Conditions and Co-morbidities  Goal: Patient's chronic conditions and co-morbidity symptoms are monitored and maintained or improved  10/29/2022 1701 by Toby Adams RN  Outcome: Progressing  10/29/2022 0508 by Haritha Archibald RN  Outcome: Progressing     Problem: Skin/Tissue Integrity  Goal: Absence of new skin breakdown  Description: 1. Monitor for areas of redness and/or skin breakdown  2. Assess vascular access sites hourly  3. Every 4-6 hours minimum:  Change oxygen saturation probe site  4. Every 4-6 hours:  If on nasal continuous positive airway pressure, respiratory therapy assess nares and determine need for appliance change or resting period.   10/29/2022 1701 by Toby Adams RN  Outcome: Progressing  10/29/2022 0508 by Haritha Archibald RN  Outcome: Progressing     Problem: Nutrition Deficit:  Goal: Optimize nutritional status  10/29/2022 1701 by Toby Adams RN  Outcome: Progressing  10/29/2022 0508 by Haritha Archibald RN  Outcome: Progressing     Problem: Safety - Adult  Goal: Free from fall injury  10/29/2022 1701 by Toby Adams RN  Outcome: Progressing  10/29/2022 0508 by Haritha Archibald RN  Outcome: Progressing  Flowsheets (Taken 10/28/2022 2121)  Free From Fall Injury: Instruct family/caregiver on patient safety     Problem: ABCDS Injury Assessment  Goal: Absence of physical injury  Outcome: Progressing

## 2022-10-29 NOTE — PROGRESS NOTES
[facility]    Telephone call to Brennan Peñaloza at [phone], voice mail message left  Telephone call to Shawn Winslow at [phone], voice mail message left. T/C to Jodee Muniz RN to advise of attempts to coordinate with family. Glades back from Truett Dance who stated she thought the family meeting was to be at 1000 this am but now that it is [phone] the 10am meeting will probably not happen. Truett Dance thought the family will gather closer to 12noon. [facility] Liaison RN arrived on floor in ICU at [id number] [phone], update given to Eddy Bowman. Family not present. HOC RN will continue to f/u.

## 2022-10-29 NOTE — PROGRESS NOTES
NEUROLOGY / NEUROCRITICAL CARE PROGRESS NOTE       Patient Name: Myles Kelly YOB: 1980   Sex: Male Age: 36 yrs     CC / Reason for Consult:     Interval Hx / Changes over last 24 hours:   Off cEEG   Per resident, family likely pursuing hospice today  Exam unchanged     ROS: unable to assess given intubation    HISTORY   Admission HPI:   Myles Kelly is a 36 y.o. y/o male with PMH significant for COPD (home O2 dependence with non-compliance), depression who I am asked to see for anoxic encephalopathy. He is unable to provide history secondary to his comatose state. He is transferred from [facility] [place]. Prior to being at [facility], he was at a long term care facility following a COPD exacerbation, and had been found down, pulseless 10/20 in the afternoon. He received four rounds of epinephrine and CPR. Duration of arrest was at least 30 minutes. Sedation has been off for the last five days. His exam remains very poor. According to neurology notes from [facility]: \"He apparently had generalized convulsive event w/ autonomic changes which ICU staff felt was an epileptic seizure. He is now on Keppra. Currently his is unresponsive in bed. He does have a bit of R facial twitching at times. Some rhythmic movement of his R shoulder is noted though seems to correlate w/ the ventilator. \" He is transferred to [facility] for cEEG monitoring. PMH No past medical history on file.    Allergies Allergies   Allergen Reactions    Vancomycin      Unknown reaction      Diet Diet NPO   Isolation No active isolations     CURRENT SCHEDULED MEDICATIONS   Inpatient Medications     scopolamine, 1 patch, TransDERmal, Q72H    ipratropium-albuterol, 1 ampule, Inhalation, 4x daily    sodium chloride flush, 5-40 mL, IntraVENous, 2 times per day    levETIRAcetam, 1,000 mg, IntraVENous, Q12H    famotidine (PEPCID) injection, 20 mg, IntraVENous, BID    sodium chloride flush, 5-40 mL, IntraVENous, 2 times per day pregabalin, 50 mg, Oral, TID    tamsulosin, 0.4 mg, Oral, Nightly    docusate, 100 mg, Oral, Daily    senna, 1 tablet, Oral, Nightly    polyethylene glycol, 17 g, Oral, Daily    budesonide, 1 mg, Nebulization, BID    lactobacillus, 2 capsule, Oral, BID WC    metoprolol tartrate, 25 mg, Oral, BID    furosemide, 20 mg, IntraVENous, BID    sulfamethoxazole-trimethoprim (BACTRIM) IVPB, 5 mg/kg, IntraVENous, Q8H    enoxaparin, 40 mg, SubCUTAneous, Daily   Infusions    sodium chloride      sodium chloride      dextrose        Antibiotics   Recent Abx Admin                     sulfamethoxazole-trimethoprim (BACTRIM) 339.2 mg in dextrose 5 % 500 mL IVPB (mg) 339.2 mg New Bag 10/29/22 0356     339.2 mg New Bag 10/28/22 2026     339.2 mg New Bag  1523                      LABS   Metabolic Panel Recent Labs     10/26/22  0826 10/27/22  0349 10/28/22  0413 10/29/22  0539   * 132* 130* 125*   K 5.2* 4.5 4.2 4.2   CL 94* 92* 90* 89*   CO2 31 33* 29 28   BUN 20 25* 25* 20   CREATININE 0.6* 0.7* 0.7* <0.5*   GLUCOSE 122* 103* 114* 181*   CALCIUM 9.3 9.5 9.4 9.0   LABALBU  --   --  3.8 3.4   PHOS 2.9  --  3.9 3.4   MG 2.30  --  2.00 1.90        CBC / Coags Recent Labs     10/27/22  0349 10/28/22  0413 10/29/22  0539   WBC 19.9* 16.7* 11.0   RBC 3.71* 3.79* 3.45*   HGB 9.4* 9.4* 9.0*   HCT 29.6* 30.4* 28.2*   * 476* 398              DIAGNOSTICS   IMAGES:  Images personally reviewed and agree w/ radiology interpretation. MRI Brain  Known diffuse bilateral Faye rolandic and occipital cytotoxic edema. Differential considerations include cerebritis, anoxic encephalopathy,   posterior reversible encephalopathy syndrome, ex cetera. EEG (10/24): severe generalized slowing and bi-hemispheric suppression     CT-C/A/P  1.   CTA CHEST: Right pleural effusion with bibasilar consolidation as well as   patchy infiltrates bilateral lungs which may be related to acute infectious   inflammatory process or sequela of aspiration. 2.  Endotracheal tube tip is noted trachea, tip at the level of the aortic   knob. 3. Prominent supradiaphragmatic portion of the inferior vena cava as it   enters the right atrium, 5 cm diameter. 4. CTA ABDOMEN/PELVIS: No acute abnormality. 5.  Cholelithiasis and contracted gallbladder without other findings of acute   cholecystitis. 6. Hepatic steatosis. 7. NG tube courses within the esophagus, tip is at the proximal gastric body. 8. Bilateral adrenal nodular hyperplasia and bilateral adrenal adenomas   (benign finding requiring no additional evaluation or follow-up). CT C spine: no fracture      CVEEG:  10/27/2022 11:12am - 23:59pm   SEIZURES:  None  INTERICTAL:  Frequent brief generalized periodic discharges (GPDs) at 0.5-1Hz that do not build up or evolve    PUSHBUTTONS:  None  BACKGROUND:  Abundant mixed delta/theta frequencies. EKG:  regular  CLINICAL INTERPRETATION:  This was an abnormal tracing for age and state due to a severe generalized slow wave abnormality indicating diffuse cerebral dysfunction which can be of multiple causes, including structural, or vascular abnormalities, toxic/metabolic conditions, hydrocephalus, or postictal conditions. GPDs lie along the ictal-interictal continuum which are of unclear significance but may be associated with seizures. No definite seizures were seen during this recording. Clinical correlation is advised.       PHYSICAL EXAMINATION     PHYSICAL EXAM:  Vitals:    10/29/22 0439 10/29/22 0500 10/29/22 0600 10/29/22 0700   BP:  (!) 141/97 114/77 122/79   Pulse:  83 91 93   Resp:  17 17 17   Temp:       TempSrc:       SpO2:  96% 95% 95%   Weight: 139 lb 12.4 oz (63.4 kg)      Height:           Mental status:  Opens eyes spontaneously does not follow commands   CN2: Visual Fields: no blink to either side with threat but blinks spontaneously  CN 3,4,6: Extraocular muscles dev to left but breaks with VOR  Gaze is deviated to the left but easily breaks with VOR  Pupils equal, round, reactive to light  Right Pupil 4 mm  Left Pupil 4 mm  CN5: Corneal reflexes intact bilaterally  CN7: Face symmetric but exam limited by ET tube  CN8: Unable to fully assess but appears to hear my voice  CN9,11: Cough reflex present; gag reflex present  CN 10, 12: Not testable due to mental status     Motor Exam:  RUE 0/5  LUE 0/5  RLE 3/5 - only with pain, suspect spinal reflex  LLE 3/5 - only with pain, suspect spinal reflex     Deep tendon reflexes: hypoactive and symmetric throughout      Sensory:  RUE: no response to trap pinch or nailbed pressure  RLE: no response to trap pinch or nailbed pressure  LUE: triple flexion with popliteal pinch  LLE: triple flexion with popliteal pinch     Coordination & Gait: not testable given mental status     Tone: normal in all 4 extremities     OTHER SYSTEMS  Cardiovascular: Warm, appears well perfused   Respiratory: Easy, non-labored respiratory pattern   Abdominal: Abdomen is without distention   Extremities: Upper and lower extremities are atraumatic in appearance without deformity. No swelling or erythema. ASSESSMENT & RECOMMENDATIONS   Assessment:  Mr. Steven Ceja is a 37 y/o man with depression and COPD (non-compliant with home O2) who presents with out-of-hospital, unwitnessed arrest 10/20, expected to be > 30 min in duration. His exam has remained poor despite being off sedation for the last seven days. MRI shows changes consistent with severe anoxia. Routine EEG with severe generalized slowing and suppression. No seizures on cEEG.       RECOMMENDATIONS:  Agree with plans for hospice   Neurology will sign off    ARIEL Mcgee CNP   Neurology & Neurocritical Care   10/29/2022 7:45 AM    ICU Patients:   Neurocritical Care Line: [phone]  [facility]: [facility] Neurocritical [facility]    Critical Care:  Due to the immediate potential for life-threatening deterioration due to neurological failure, I spent 30 minutes providing critical care. This time excludes time spent performing procedures but includes time spent on direct patient care, history retrieval, review of the chart, and discussions with patient, family, and consultant(s).

## 2022-10-30 NOTE — PROGRESS NOTES
Family remains at bedside. Concerns have been addressed, patient appears to be comfortable. Morphine still as needed for air hunger and comfort.

## 2022-10-30 NOTE — PROGRESS NOTES
Family at bedside. Morphine given as needed for air hunger and comfort. Nurse bedside to aide family and patient through this difficult time.

## 2022-10-30 NOTE — DEATH NOTES
Death Pronouncement Note  Patient's Name: Kinsey Iqbal   Patient's YOB: 1980  MRN Number: [id number]    Admitting Provider: Clay Silva DO  Attending Provider:  Clay Silva DO    Patient was examined and the following were absent: Pulses, Blood Pressure, and Respiratory effort    I declared the patient dead on 10/30/2022 at 3:07 AM    Preliminary Cause of Death:    Cardiac Arrest  Electronically signed by Homero Olsen DO on 10/30/22 at 3:32 AM EDT

## 2022-10-31 NOTE — H&P
ICU HISTORY AND PHYSICAL       Hospital Day:   ICU Day:                                                          Code:Prior  Admit Date: 10/29/2022  PCP: Maddi Butts DO                                  CC: Hospice    HISTORY OF PRESENT ILLNESS:   Per  ED: Lindsay Lynn is a 36 y.o. male who transferred to [place] [address] from [facility]. Per chart review, the pt presented to [address] [place] [address],[address] ED via EMS in cardiac arrest.  \"History from EMS is that this patient was at a care facility. He has a history of severe COPD, with a history of noncompliance with his oxygen. He was found down at the care facility. EMS was called. EMS reports that they gave 2 A of bicarb, 4 rounds of epinephrine and they also did CPR through [place] device. They intubated the patient with a [place] airway. They did obtain return of spontaneous circulation. Patient is unable to provide history and currently there is no family to provide any further history. \"     Per  DC Summ: Patient admitted 5 days ago to [facility] AT [place] after an out of hospital cardiac arrest.  He is currently on vent support. Recovery postarrest been after holding sedation has not progressed. MRI concerning for underlying anoxic brain injury. There is also concern for possible underlying nonepileptic seizures given noted seizure activity on 10/24/2022. After discussion with patient's family and neurology patient is being transferred to [facility]. for continuous EEG to further assess for any ongoing seizure activity. Family is aware that prognosis is poor given the findings on MRI. Patient is medically stable for transfer to [facility].. MRI findings consistent with severe anoxia. Routine EEG with severe generalized slowing and suppression. No seizures on cEEG. After further discussion with family about his prognosis and clinical status family agreed to proceed with hospice.  Patient was admitted for hospice          PAST HISTORY:   No past medical history on file.    No past surgical history on file. SocialHistory:          Family History:  No family history on file. MEDICATIONS:     No current facility-administered medications on file prior to encounter. Current Outpatient Medications on File Prior to Encounter   Medication Sig Dispense Refill    acetaminophen (TYLENOL) 500 MG tablet Take 1,000 mg by mouth every 6 hours as needed for Pain or Fever      LORazepam (ATIVAN) 0.5 MG tablet Take 0.5 mg by mouth every 8 hours as needed for Anxiety. azithromycin (ZITHROMAX) 250 MG tablet Take 250 mg by mouth three times a week Mon/Wed/Fri      buPROPion (WELLBUTRIN XL) 300 MG extended release tablet Take 300 mg by mouth every morning      apixaban (ELIQUIS) 5 MG TABS tablet Take 5 mg by mouth 2 times daily      hydrOXYzine HCl (ATARAX) 25 MG tablet Take 25 mg by mouth in the morning, at noon, and at bedtime      ipratropium-albuterol (DUONEB) 0.5-2.5 (3) MG/3ML SOLN nebulizer solution Inhale 1 vial into the lungs every 6 hours as needed for Shortness of Breath      pregabalin (LYRICA) 50 MG capsule Take 50 mg by mouth 3 times daily. metoprolol tartrate (LOPRESSOR) 25 MG tablet Take 25 mg by mouth 2 times daily      mometasone (ASMANEX) 200 MCG/ACT AERO inhaler Inhale 2 puffs into the lungs 2 times daily      montelukast (SINGULAIR) 10 MG tablet Take 10 mg by mouth nightly      nicotine (NICODERM CQ) 21 MG/24HR Place 1 patch onto the skin every 24 hours      predniSONE (DELTASONE) 20 MG tablet Take 20 mg by mouth See Admin Instructions 20 mg through 10/21/22, then decrease to 10 mg daily through 11/11/22      pantoprazole (PROTONIX) 40 MG tablet Take 40 mg by mouth daily      oxyCODONE (ROXICODONE) 5 MG immediate release tablet Take 5 mg by mouth every 6 hours as needed for Pain.      tamsulosin (FLOMAX) 0.4 MG capsule Take 0.4 mg by mouth at bedtime           Scheduled Meds:   Continuous Infusions:  PRN Meds:    Allergies:    Allergies   Allergen Reactions Vancomycin      Unknown reaction       REVIEW OF SYSTEMS:       History obtained from chart review    Review of Systems  A 10 point review of systems was conducted, significant findings as noted in HPI. PHYSICAL EXAM:       Vitals: Pulse (!) 0   Resp (!) 0   SpO2 (!) 21%     I/O:  No intake or output data in the 24 hours ending 10/31/22 0912  No intake/output data recorded. No intake/output data recorded. Physical Examination:     Physical Exam  Constitutional:       Comments: Patient is intubated. HENT:      Head: Normocephalic and atraumatic. Eyes:      Conjunctiva/sclera: Conjunctivae normal.      Pupils: Pupils are equal, round, and reactive to light. Cardiovascular:      Rate and Rhythm: Normal rate and regular rhythm. Pulses: Normal pulses. Heart sounds: Normal heart sounds. Pulmonary:      Comments: Patient intubated. Mechanical breath sounds present bilateral.  Abdominal:      General: Abdomen is flat. Bowel sounds are normal.      Palpations: Abdomen is soft. Neurological:      Comments: Patient is not sedated and not responsive to any stimuli              DATA:       Labs:  CBC:   Recent Labs     10/29/22  0539   WBC 11.0   HGB 9.0*   HCT 28.2*          BMP:   Recent Labs     10/29/22  0539   *   K 4.2   CL 89*   CO2 28   BUN 20   CREATININE <0.5*   GLUCOSE 181*   PHOS 3.4     LFT's: No results for input(s): AST, ALT, ALB, BILITOT, ALKPHOS in the last 72 hours. Troponin: No results for input(s): TROPONINI in the last 72 hours. BNP:No results for input(s): BNP in the last 72 hours. ABGs:   Recent Labs     10/29/22  0436   PHART 7.347*   BKV0CIZ 59.2*   PO2ART 103.0     INR: No results for input(s): INR in the last 72 hours. U/A:No results for input(s): NITRITE, COLORU, PHUR, LABCAST, WBCUA, RBCUA, MUCUS, TRICHOMONAS, YEAST, BACTERIA, CLARITYU, SPECGRAV, LEUKOCYTESUR, UROBILINOGEN, BILIRUBINUR, BLOODU, GLUCOSEU, AMORPHOUS in the last 72 hours.     Invalid input(s): KETONESU    No orders to display       ASSESSMENT AND PLAN:     Hospice care  Patient DNR CC will focus on comfort measures  -Terminal extubation   - IV Morphine   - Scopolamine patch    Code Status: Prior  FEN: No diet orders on file  PPX:  SCDs  DISPO: ICU    This patient will be staffed and discussed with Dr Forest Guerra  -----------------------------  Mary Anne Krishna MD, PGY-2  10/31/2022  9:12 AM      Please note that this chart was generated using Dragon dictation software. Although every effort was made to ensure the accuracy of this automated transcription, some errors in transcription may have occurred.

## 2022-10-31 NOTE — DISCHARGE SUMMARY
Hospital Medicine Discharge Summary    Patient ID: Sander Walters      Patient's PCP: Bharat Croft DO    Admit Date: 10/29/2022     Discharge Date: 10/30/2022      Admitting Provider: Zaina Priest DO     Discharge Provider: Zaina Priest DO     Discharge Diagnoses: There are no active hospital problems to display for this patient. The patient was seen and examined on day of discharge and this discharge summary is in conjunction with any daily progress note from day of discharge. Hospital Course: 36 y.o. male who transferred to [facility] from [facility]. Per chart review, the pt presented to [address] [place] [address],[address] ED via EMS in cardiac arrest.  \"History from EMS is that this patient was at a care facility. He has a history of severe COPD, with a history of noncompliance with his oxygen. He was found down at the care facility. EMS was called. EMS reports that they gave 2 A of bicarb, 4 rounds of epinephrine and they also did CPR through [place] device. They intubated the patient with a Garo airway. They did obtain return of spontaneous circulation. Patient is unable to provide history and currently there is no family to provide any further history. \"     Per [facility] Summ: Patient admitted 5 days ago to [facility] AT [place] after an out of hospital cardiac arrest.  He is currently on vent support. Recovery postarrest been after holding sedation has not progressed. MRI concerning for underlying anoxic brain injury. There is also concern for possible underlying nonepileptic seizures given noted seizure activity on 10/24/2022. After discussion with patient's family and neurology patient is being transferred to [facility]. for continuous EEG to further assess for any ongoing seizure activity. Family is aware that prognosis is poor given the findings on MRI. Patient is medically stable for transfer to [facility].. MRI findings consistent with severe anoxia.   Routine EEG with severe generalized slowing and suppression. No seizures on cEEG. After further discussion with family about his prognosis and clinical status family agreed to proceed with hospice. Patient was admitted for hospice         Patient passed away after terminal extubation          Physical Exam Performed:     Pulse (!) 0   Resp (!) 0   SpO2 (!) 21%     Np BP, HR, breathing or reflex    Labs: For convenience and continuity at follow-up the following most recent labs are provided:      CBC:    Lab Results   Component Value Date/Time    WBC 11.0 10/29/2022 05:39 AM    HGB 9.0 10/29/2022 05:39 AM    HCT 28.2 10/29/2022 05:39 AM     10/29/2022 05:39 AM       Renal:    Lab Results   Component Value Date/Time     10/29/2022 05:39 AM    K 4.2 10/29/2022 05:39 AM    K 4.5 10/27/2022 03:49 AM    CL 89 10/29/2022 05:39 AM    CO2 28 10/29/2022 05:39 AM    BUN 20 10/29/2022 05:39 AM    CREATININE <0.5 10/29/2022 05:39 AM    CALCIUM 9.0 10/29/2022 05:39 AM    PHOS 3.4 10/29/2022 05:39 AM         Significant Diagnostic Studies    Radiology:   No orders to display          Consults:     None    Disposition:  Morgue     Condition at Discharge: Terminal    Discharge Instructions/Follow-up:  none    Code Status:  Prior     Activity: activity as tolerated    Diet:  none      Discharge Medications:     Time Spent on discharge is more than 45 minutes in the examination, evaluation, counseling and review of medications and discharge plan. Signed: Matti Pierre DO   10/31/2022      Thank you Ray Robbins DO for the opportunity to be involved in this patient's care. If you have any questions or concerns, please feel free to contact me at [phone].

## 2022-12-08 NOTE — DISCHARGE SUMMARY
Hospital Medicine Discharge Summary    Patient ID: Augusto Sylvester      Patient's PCP: Teja Flood DO    Admit Date: 10/26/2022     Discharge Date: 10/29/2022      Admitting Provider: Artur Rubin DO     Discharge Provider: Artur Rubin DO     Discharge Diagnoses: Active Hospital Problems    Diagnosis     Cardiac arrest [facility]) [I46.9]      Priority: Medium    Seizure-like activity ([address] [R56.9]      Priority: Medium    Pneumonia [J18.9]      Priority: Medium    Acute respiratory failure with hypoxia ([address] [J96.01]      Priority: Medium    Anoxic brain injury ([address] [G93.1]      Priority: Medium    Cardiopulmonary arrest ([address] [I46.9]      Priority: Medium       The patient was seen and examined on day of discharge and this discharge summary is in conjunction with any daily progress note from day of discharge. Hospital Course: Augusto Sylvester is a 36 y.o. male who transferred to [facility] from [facility]. Per chart review, the pt presented to [address] [place] [address],[address] ED via EMS in cardiac arrest.  \"History from EMS is that this patient was at a care facility. He has a history of severe COPD, with a history of noncompliance with his oxygen. He was found down at the care facility. EMS was called. EMS reports that they gave 2 A of bicarb, 4 rounds of epinephrine and they also did CPR through [place] device. They intubated the patient with a [place] airway. They did obtain return of spontaneous circulation. Patient is unable to provide history and currently there is no family to provide any further history. \"     Per [facility] Summ: Patient admitted 5 days ago to [facility] after an out of hospital cardiac arrest.  He is currently on vent support. Recovery postarrest been after holding sedation has not progressed. MRI concerning for underlying anoxic brain injury. There is also concern for possible underlying nonepileptic seizures given noted seizure activity on 10/24/2022.   After discussion with patient's family and neurology patient is being transferred to [facility] for continuous EEG to further assess for any ongoing seizure activity. Family is aware that prognosis is poor given the findings on MRI. Patient is medically stable for transfer to [facility].     Currently, the patient is intubated and mechanically ventilated, unresponsive. Cardiopulmonary arrest  Unclear triggering event, found down at nursing home for ~30 minutes before arriving at [address] [place] [address] and mechanically ventilated  - lopressor   - ICU team managing     Anoxic Encephalopathy   Seizure-like activity  10/25 MRI with significant concern for anoxic encephalopathy likely 2/2 cardiopulmonary arrest. EEG without seizure on 10/24  - neurology consulted  - cEEG   - ativan, versed prn   - 1g keppra IV BID   - precedex gtt  - palliative care consulted  - neurology consulted  -  EEG so far neg after discussing with neuro  - palliative will discuss with family more  - likely will need hospice  - family to have meeting with hospice  - GIP     COPD  Respiratory failure  Gram negative pneumonia 2/2 stenotrophomonas per [facility] [address]   - continue pulmocrit, duonebs   - continue lasix   - continue bactrim      Goals of care  - per sister Walt De Los Santos, patient would NOT want life-saving measures in case of acute cardiac arrest in his current condition  - palliative consulted         Physical Exam Performed:     /89   Pulse (!) 102   Temp 98.8 °F (37.1 °C) (Axillary)   Resp 18   Ht 5' 5.98\" (1.676 m)   Wt 139 lb 12.4 oz (63.4 kg)   SpO2 95%   BMI 22.57 kg/m²       General appearance: intubated sedated  HEENT: Pupils dilated. Neck: Supple, Trachea midline. Respiratory:  Normal respiratory effort. Clear to auscultation, bilaterally without Rales/Wheezes/Rhonchi. Cardiovascular: Regular rate and rhythm with normal S1/S2 without murmurs, rubs or gallops. Abdomen: Soft, non-tender, non-distended with normal bowel sounds.   Musculoskeletal: No clubbing, cyanosis or edema bilaterally. Skin: Skin color, texture, turgor normal.  No rashes or lesions. Neurologic:  withdraws to pain, no cough or gag  Psychiatric: intubated, sedated  Capillary Refill: Brisk, 3 seconds, normal   Peripheral Pulses: +2 palpable, equal bilaterally       Labs: For convenience and continuity at follow-up the following most recent labs are provided:      CBC:    Lab Results   Component Value Date/Time    WBC 11.0 10/29/2022 05:39 AM    HGB 9.0 10/29/2022 05:39 AM    HCT 28.2 10/29/2022 05:39 AM     10/29/2022 05:39 AM       Renal:    Lab Results   Component Value Date/Time     10/29/2022 05:39 AM    K 4.2 10/29/2022 05:39 AM    K 4.5 10/27/2022 03:49 AM    CL 89 10/29/2022 05:39 AM    CO2 28 10/29/2022 05:39 AM    BUN 20 10/29/2022 05:39 AM    CREATININE <0.5 10/29/2022 05:39 AM    CALCIUM 9.0 10/29/2022 05:39 AM    PHOS 3.4 10/29/2022 05:39 AM         Significant Diagnostic Studies    Radiology:   XR CHEST PORTABLE   Final Result   1. No interval changes. Consults:     IP CONSULT TO NEUROLOGY  IP CONSULT TO PALLIATIVE CARE  IP CONSULT TO HOSPICE    Disposition:  GIP     Condition at Discharge: Terminal    Discharge Instructions/Follow-up:  none    Code Status:  Prior     Activity: activity as tolerated    Diet: regular diet      Discharge Medications:     Discharge Medication List as of 10/29/2022  5:17 PM             Details   acetaminophen (TYLENOL) 500 MG tablet Take 1,000 mg by mouth every 6 hours as needed for Pain or FeverHistorical Med      LORazepam (ATIVAN) 0.5 MG tablet Take 0.5 mg by mouth every 8 hours as needed for Anxiety. Historical Med      azithromycin (ZITHROMAX) 250 MG tablet Take 250 mg by mouth three times a week Mon/Wed/FriHistorical Med      buPROPion (WELLBUTRIN XL) 300 MG extended release tablet Take 300 mg by mouth every morningHistorical Med      apixaban (ELIQUIS) 5 MG TABS tablet Take 5 mg by mouth 2 times dailyHistorical Med hydrOXYzine HCl (ATARAX) 25 MG tablet Take 25 mg by mouth in the morning, at noon, and at bedtimeHistorical Med      ipratropium-albuterol (DUONEB) 0.5-2.5 (3) MG/3ML SOLN nebulizer solution Inhale 1 vial into the lungs every 6 hours as needed for Shortness of BreathHistorical Med      pregabalin (LYRICA) 50 MG capsule Take 50 mg by mouth 3 times daily. Historical Med      metoprolol tartrate (LOPRESSOR) 25 MG tablet Take 25 mg by mouth 2 times dailyHistorical Med      mometasone (ASMANEX) 200 MCG/ACT AERO inhaler Inhale 2 puffs into the lungs 2 times dailyHistorical Med      montelukast (SINGULAIR) 10 MG tablet Take 10 mg by mouth nightlyHistorical Med      nicotine (NICODERM CQ) 21 MG/24HR Place 1 patch onto the skin every 24 hoursHistorical Med      predniSONE (DELTASONE) 20 MG tablet Take 20 mg by mouth See Admin Instructions 20 mg through 10/21/22, then decrease to 10 mg daily through 11/11/22Historical Med      pantoprazole (PROTONIX) 40 MG tablet Take 40 mg by mouth dailyHistorical Med      oxyCODONE (ROXICODONE) 5 MG immediate release tablet Take 5 mg by mouth every 6 hours as needed for Pain. Historical Med      tamsulosin (FLOMAX) 0.4 MG capsule Take 0.4 mg by mouth at bedtimeHistorical Med             Time Spent on discharge: 45 mins in the examination, evaluation, counseling and review of medications and discharge plan. Signed: Destin Jason DO   12/8/2022      Thank you Violet Solomon DO for the opportunity to be involved in this patient's care. If you have any questions or concerns, please feel free to contact me at [phone].
